# Patient Record
Sex: MALE | Race: WHITE | Employment: OTHER | URBAN - METROPOLITAN AREA
[De-identification: names, ages, dates, MRNs, and addresses within clinical notes are randomized per-mention and may not be internally consistent; named-entity substitution may affect disease eponyms.]

---

## 2017-01-13 ENCOUNTER — LAB ENCOUNTER (OUTPATIENT)
Dept: LAB | Age: 77
End: 2017-01-13
Attending: INTERNAL MEDICINE
Payer: MEDICARE

## 2017-01-13 DIAGNOSIS — R94.31 ABNORMAL EKG: ICD-10-CM

## 2017-01-13 DIAGNOSIS — I25.10 CORONARY ARTERY DISEASE INVOLVING NATIVE CORONARY ARTERY OF NATIVE HEART WITHOUT ANGINA PECTORIS: ICD-10-CM

## 2017-01-13 DIAGNOSIS — Z95.5 STATUS POST CORONARY ARTERY STENT PLACEMENT: ICD-10-CM

## 2017-01-13 DIAGNOSIS — I10 ESSENTIAL HYPERTENSION: ICD-10-CM

## 2017-01-13 DIAGNOSIS — Z00.00 ROUTINE GENERAL MEDICAL EXAMINATION AT A HEALTH CARE FACILITY: ICD-10-CM

## 2017-01-13 LAB
ALBUMIN SERPL-MCNC: 3.7 G/DL (ref 3.5–4.8)
ALP LIVER SERPL-CCNC: 68 U/L (ref 45–117)
ALT SERPL-CCNC: 22 U/L (ref 17–63)
AST SERPL-CCNC: 23 U/L (ref 15–41)
BASOPHILS # BLD AUTO: 0.04 X10(3) UL (ref 0–0.1)
BASOPHILS NFR BLD AUTO: 0.8 %
BILIRUB SERPL-MCNC: 0.6 MG/DL (ref 0.1–2)
BUN BLD-MCNC: 21 MG/DL (ref 8–20)
CALCIUM BLD-MCNC: 8.9 MG/DL (ref 8.3–10.3)
CHLORIDE: 107 MMOL/L (ref 101–111)
CHOLEST SMN-MCNC: 117 MG/DL (ref ?–200)
CO2: 30 MMOL/L (ref 22–32)
CREAT BLD-MCNC: 0.92 MG/DL (ref 0.7–1.3)
EOSINOPHIL # BLD AUTO: 0.31 X10(3) UL (ref 0–0.3)
EOSINOPHIL NFR BLD AUTO: 6.1 %
ERYTHROCYTE [DISTWIDTH] IN BLOOD BY AUTOMATED COUNT: 12.2 % (ref 11.5–16)
GLUCOSE BLD-MCNC: 83 MG/DL (ref 70–99)
HCT VFR BLD AUTO: 42.3 % (ref 37–53)
HDLC SERPL-MCNC: 76 MG/DL (ref 45–?)
HDLC SERPL: 1.54 {RATIO} (ref ?–4.97)
HGB BLD-MCNC: 14.2 G/DL (ref 13–17)
IMMATURE GRANULOCYTE COUNT: 0.01 X10(3) UL (ref 0–1)
IMMATURE GRANULOCYTE RATIO %: 0.2 %
LDLC SERPL CALC-MCNC: 33 MG/DL (ref ?–130)
LYMPHOCYTES # BLD AUTO: 1.95 X10(3) UL (ref 0.9–4)
LYMPHOCYTES NFR BLD AUTO: 38.2 %
M PROTEIN MFR SERPL ELPH: 7 G/DL (ref 6.1–8.3)
MCH RBC QN AUTO: 32.6 PG (ref 27–33.2)
MCHC RBC AUTO-ENTMCNC: 33.6 G/DL (ref 31–37)
MCV RBC AUTO: 97 FL (ref 80–99)
MONOCYTES # BLD AUTO: 0.63 X10(3) UL (ref 0.1–0.6)
MONOCYTES NFR BLD AUTO: 12.3 %
NEUTROPHIL ABS PRELIM: 2.17 X10 (3) UL (ref 1.3–6.7)
NEUTROPHILS # BLD AUTO: 2.17 X10(3) UL (ref 1.3–6.7)
NEUTROPHILS NFR BLD AUTO: 42.4 %
NONHDLC SERPL-MCNC: 41 MG/DL (ref ?–130)
PLATELET # BLD AUTO: 175 10(3)UL (ref 150–450)
POTASSIUM SERPL-SCNC: 4 MMOL/L (ref 3.6–5.1)
RBC # BLD AUTO: 4.36 X10(6)UL (ref 3.8–5.8)
RED CELL DISTRIBUTION WIDTH-SD: 43.7 FL (ref 35.1–46.3)
SODIUM SERPL-SCNC: 143 MMOL/L (ref 136–144)
TRIGLYCERIDES: 38 MG/DL (ref ?–150)
VLDL: 8 MG/DL (ref 5–40)
WBC # BLD AUTO: 5.1 X10(3) UL (ref 4–13)

## 2017-01-13 PROCEDURE — 80061 LIPID PANEL: CPT

## 2017-01-13 PROCEDURE — 36415 COLL VENOUS BLD VENIPUNCTURE: CPT

## 2017-01-13 PROCEDURE — 80053 COMPREHEN METABOLIC PANEL: CPT

## 2017-01-13 PROCEDURE — 85025 COMPLETE CBC W/AUTO DIFF WBC: CPT

## 2017-01-17 ENCOUNTER — PATIENT MESSAGE (OUTPATIENT)
Dept: INTERNAL MEDICINE CLINIC | Facility: CLINIC | Age: 77
End: 2017-01-17

## 2017-01-17 NOTE — TELEPHONE ENCOUNTER
From: Vish Jasso  To: Mirtha Serrato MD  Sent: 1/17/2017 1:43 PM CST  Subject: Other       Dr Olson/Dulce,   I may have misunderstood but thought as a result of the increase of Atorvastatin that besides Cholesterol test   that Liver test were also

## 2017-04-03 RX ORDER — ERGOCALCIFEROL 1.25 MG/1
CAPSULE ORAL
Qty: 3 CAPSULE | Refills: 2 | Status: SHIPPED | OUTPATIENT
Start: 2017-04-03 | End: 2017-10-09

## 2017-06-12 ENCOUNTER — PATIENT MESSAGE (OUTPATIENT)
Dept: INTERNAL MEDICINE CLINIC | Facility: CLINIC | Age: 77
End: 2017-06-12

## 2017-06-12 NOTE — TELEPHONE ENCOUNTER
From: Federica Swann  To: Stacia Lesches, MD  Sent: 6/12/2017 2:56 PM CDT  Subject: Non-Urgent Medical Question       Dr Olson/Dulce,   This note is in regard to scheduling my annual physical. In 2016 on 9-7 I came in for the blood work and tests.      O

## 2017-06-29 RX ORDER — METOPROLOL SUCCINATE 25 MG/1
TABLET, EXTENDED RELEASE ORAL
Qty: 90 TABLET | Refills: 2 | Status: SHIPPED | OUTPATIENT
Start: 2017-06-29 | End: 2018-04-03

## 2017-07-25 ENCOUNTER — PATIENT MESSAGE (OUTPATIENT)
Dept: INTERNAL MEDICINE CLINIC | Facility: CLINIC | Age: 77
End: 2017-07-25

## 2017-07-25 NOTE — TELEPHONE ENCOUNTER
From: Shaorn Dumont  To: Becky Magana MD  Sent: 7/25/2017 3:10 PM CDT  Subject: Non-Urgent Medical Question     Dr Rosa Mckenna,   On Friday & Saturday ( 7-17 & 18 ) I had a hurting or discomfort in my right shoulder.  The   The shoulder no longer is an is

## 2017-09-06 DIAGNOSIS — Z00.00 ROUTINE GENERAL MEDICAL EXAMINATION AT A HEALTH CARE FACILITY: ICD-10-CM

## 2017-09-06 RX ORDER — TAMSULOSIN HYDROCHLORIDE 0.4 MG/1
CAPSULE ORAL
Qty: 90 CAPSULE | Refills: 2 | Status: SHIPPED | OUTPATIENT
Start: 2017-09-06 | End: 2018-06-13

## 2017-09-13 ENCOUNTER — NURSE ONLY (OUTPATIENT)
Dept: INTERNAL MEDICINE CLINIC | Facility: CLINIC | Age: 77
End: 2017-09-13

## 2017-09-13 DIAGNOSIS — Z00.00 LABORATORY EXAMINATION ORDERED AS PART OF A ROUTINE GENERAL MEDICAL EXAMINATION: Primary | ICD-10-CM

## 2017-09-13 LAB
BILIRUB UR QL STRIP.AUTO: NEGATIVE
CLARITY UR REFRACT.AUTO: CLEAR
GLUCOSE UR STRIP.AUTO-MCNC: NEGATIVE MG/DL
KETONES UR STRIP.AUTO-MCNC: NEGATIVE MG/DL
LEUKOCYTE ESTERASE UR QL STRIP.AUTO: NEGATIVE
NITRITE UR QL STRIP.AUTO: NEGATIVE
PH UR STRIP.AUTO: 6 [PH] (ref 4.5–8)
PROT UR STRIP.AUTO-MCNC: NEGATIVE MG/DL
RBC UR QL AUTO: NEGATIVE
SP GR UR STRIP.AUTO: 1.01 (ref 1–1.03)
UROBILINOGEN UR STRIP.AUTO-MCNC: <2 MG/DL

## 2017-09-13 PROCEDURE — 93000 ELECTROCARDIOGRAM COMPLETE: CPT | Performed by: INTERNAL MEDICINE

## 2017-09-13 PROCEDURE — 94010 BREATHING CAPACITY TEST: CPT | Performed by: INTERNAL MEDICINE

## 2017-09-13 PROCEDURE — 81003 URINALYSIS AUTO W/O SCOPE: CPT | Performed by: INTERNAL MEDICINE

## 2017-09-13 PROCEDURE — 99173 VISUAL ACUITY SCREEN: CPT | Performed by: INTERNAL MEDICINE

## 2017-09-13 PROCEDURE — 92551 PURE TONE HEARING TEST AIR: CPT | Performed by: INTERNAL MEDICINE

## 2017-09-13 NOTE — PROGRESS NOTES
*LOOKIN' BODY RESULTS    YES: X      NO:       REASON TEST NOT PERFORMED:        HEIGHT: 5'7.5   WEIGHT: 145  _____________________________________________________________________________  *VENIPUNCTURE    YES: X      NO:        REASON VENIPUNCTURE NOT PER

## 2017-09-19 ENCOUNTER — MED REC SCAN ONLY (OUTPATIENT)
Dept: INTERNAL MEDICINE CLINIC | Facility: CLINIC | Age: 77
End: 2017-09-19

## 2017-09-21 ENCOUNTER — OFFICE VISIT (OUTPATIENT)
Dept: INTERNAL MEDICINE CLINIC | Facility: CLINIC | Age: 77
End: 2017-09-21

## 2017-09-21 DIAGNOSIS — Z53.8 APPOINTMENT CANCELED BY HOSPITAL: Primary | ICD-10-CM

## 2017-09-21 DIAGNOSIS — E55.9 VITAMIN D DEFICIENCY: ICD-10-CM

## 2017-09-21 DIAGNOSIS — M85.839 OSTEOPENIA OF FOREARM, UNSPECIFIED LATERALITY: ICD-10-CM

## 2017-10-04 NOTE — PROGRESS NOTES
HEALTH MAINTENANCE:        Immunization History  Administered            Date(s) Administered    DTAP                  09/14/2014      HIGH DOSE FLU 65 YRS AND OLDER PRSV FREE SINGLE D (96882) FLU CLINIC                          10/24/2016      Influenza Fail Right Ear @ 25dBHL - 4000 Hz: Fail   Left Ear @ 40dBHL - 500 Hz: Pass Right Ear @ 40 dBHL - 500 Hz: Pass   Left Ear @ 40dBHL - 1000 Hz: Pass Right Ear @ 40 dBHL - 1000 Hz: Pass   Left Ear @ 40dBHL - 2000 Hz: Pass Right Ear @ 40 dBHL - 2000 Hz: Pass

## 2017-10-09 ENCOUNTER — OFFICE VISIT (OUTPATIENT)
Dept: INTERNAL MEDICINE CLINIC | Facility: CLINIC | Age: 77
End: 2017-10-09

## 2017-10-09 VITALS
WEIGHT: 145 LBS | BODY MASS INDEX: 22.49 KG/M2 | HEART RATE: 63 BPM | RESPIRATION RATE: 12 BRPM | DIASTOLIC BLOOD PRESSURE: 60 MMHG | TEMPERATURE: 98 F | SYSTOLIC BLOOD PRESSURE: 120 MMHG | OXYGEN SATURATION: 98 % | HEIGHT: 67.5 IN

## 2017-10-09 DIAGNOSIS — Z00.00 ROUTINE GENERAL MEDICAL EXAMINATION AT A HEALTH CARE FACILITY: ICD-10-CM

## 2017-10-09 DIAGNOSIS — I10 ESSENTIAL HYPERTENSION: ICD-10-CM

## 2017-10-09 DIAGNOSIS — R79.82 ELEVATED C-REACTIVE PROTEIN (CRP): ICD-10-CM

## 2017-10-09 DIAGNOSIS — E78.00 PURE HYPERCHOLESTEROLEMIA: ICD-10-CM

## 2017-10-09 DIAGNOSIS — Z98.890 H/O COLONOSCOPY: ICD-10-CM

## 2017-10-09 DIAGNOSIS — Z23 NEED FOR INFLUENZA VACCINATION: ICD-10-CM

## 2017-10-09 DIAGNOSIS — I65.23 ATHEROSCLEROSIS OF BOTH CAROTID ARTERIES: Primary | ICD-10-CM

## 2017-10-09 DIAGNOSIS — M85.839 OSTEOPENIA OF FOREARM, UNSPECIFIED LATERALITY: ICD-10-CM

## 2017-10-09 DIAGNOSIS — Q61.3 POLYCYSTIC KIDNEY: ICD-10-CM

## 2017-10-09 DIAGNOSIS — Z79.51 LONG TERM CURRENT USE OF INHALED STEROID: ICD-10-CM

## 2017-10-09 DIAGNOSIS — C44.90 SKIN CANCER: ICD-10-CM

## 2017-10-09 DIAGNOSIS — Z86.69 HISTORY OF MIGRAINE: ICD-10-CM

## 2017-10-09 DIAGNOSIS — Z95.5 STATUS POST CORONARY ARTERY STENT PLACEMENT: ICD-10-CM

## 2017-10-09 DIAGNOSIS — N40.1 BENIGN NON-NODULAR PROSTATIC HYPERPLASIA WITH LOWER URINARY TRACT SYMPTOMS: ICD-10-CM

## 2017-10-09 PROCEDURE — G0439 PPPS, SUBSEQ VISIT: HCPCS | Performed by: INTERNAL MEDICINE

## 2017-10-09 PROCEDURE — G0008 ADMIN INFLUENZA VIRUS VAC: HCPCS | Performed by: INTERNAL MEDICINE

## 2017-10-09 PROCEDURE — 90653 IIV ADJUVANT VACCINE IM: CPT | Performed by: INTERNAL MEDICINE

## 2017-10-09 RX ORDER — AMLODIPINE BESYLATE 5 MG/1
5 TABLET ORAL DAILY
Qty: 90 TABLET | Refills: 3 | Status: SHIPPED | OUTPATIENT
Start: 2017-10-09 | End: 2017-10-11

## 2017-10-09 RX ORDER — AMLODIPINE BESYLATE 2.5 MG/1
2.5 TABLET ORAL DAILY
Qty: 90 TABLET | Refills: 0 | Status: SHIPPED | OUTPATIENT
Start: 2017-10-09 | End: 2018-05-30

## 2017-10-09 RX ORDER — ATORVASTATIN CALCIUM 20 MG/1
20 TABLET, FILM COATED ORAL NIGHTLY
Qty: 90 TABLET | Refills: 3 | Status: SHIPPED | OUTPATIENT
Start: 2017-10-09 | End: 2018-05-30

## 2017-10-09 RX ORDER — POTASSIUM CHLORIDE 750 MG/1
10 TABLET, FILM COATED, EXTENDED RELEASE ORAL EVERY OTHER DAY
Qty: 45 TABLET | Refills: 3 | Status: SHIPPED | OUTPATIENT
Start: 2017-10-09 | End: 2018-09-24

## 2017-10-09 RX ORDER — AMLODIPINE BESYLATE 5 MG/1
5 TABLET ORAL DAILY
Qty: 90 TABLET | Refills: 0 | Status: SHIPPED | OUTPATIENT
Start: 2017-10-09 | End: 2017-10-11

## 2017-10-09 RX ORDER — AMLODIPINE BESYLATE 2.5 MG/1
2.5 TABLET ORAL DAILY
Qty: 90 TABLET | Refills: 3 | Status: SHIPPED | OUTPATIENT
Start: 2017-10-09 | End: 2018-09-24

## 2017-10-09 RX ORDER — FLUTICASONE PROPIONATE 50 MCG
2 SPRAY, SUSPENSION (ML) NASAL DAILY
Qty: 1 BOTTLE | Refills: 3 | Status: SHIPPED | OUTPATIENT
Start: 2017-10-09 | End: 2018-09-24

## 2017-10-09 RX ORDER — ERGOCALCIFEROL 1.25 MG/1
50000 CAPSULE ORAL
Qty: 3 CAPSULE | Refills: 3 | Status: SHIPPED | OUTPATIENT
Start: 2017-10-09 | End: 2017-11-08

## 2017-10-09 RX ORDER — ATORVASTATIN CALCIUM 20 MG/1
20 TABLET, FILM COATED ORAL NIGHTLY
Qty: 135 TABLET | Refills: 3 | Status: SHIPPED | OUTPATIENT
Start: 2017-10-09 | End: 2018-08-01

## 2017-10-09 RX ORDER — ERGOCALCIFEROL 1.25 MG/1
CAPSULE ORAL
Qty: 3 CAPSULE | Refills: 2 | Status: SHIPPED | OUTPATIENT
Start: 2017-10-09 | End: 2017-10-11

## 2017-10-09 NOTE — PROGRESS NOTES
HPI:    Patient ID: Aramis Crane is a 68year old male. HPI  DEAR Jose Angel    IT WAS NICE SEEING YOU FOR YOUR WELLNESS VISIT ON 10/9/2017            Review of Systems    HPI:    Patient ID: Aramis Crane is a 68year old male.     History of Present Illn Have you had any memory issues?: 0-No    Fall/Risk Scorin          PHQ-4: Over the LAST 2 WEEKS               Little interest or pleasure in doing things (over the last two weeks)?: Not at all    Feeling down, depressed, or hopeless (over the last of kidney stones. Nocturia times: Several times per can go back to sleep. No variant of polycystic kidney large multiple cyst history of renal function okay . On Flomax for BPH urinary tract symptoms very modest symptoms without lightheadedness.     Musc LAD (2.5 x 26mm Resolute Integrity) EF 45%   • Back pain    • BPH (benign prostatic hypertrophy)    • Cataract    • Herpes    • HIGH BLOOD PRESSURE    • Hyperlipidemia    • Inguinal hernia    • OA (osteoarthritis)     hips and hands   • Osteoarthrosis, uns PHYSICAL EXAM:      Vital signs reviewed. Constitutional: Oriented to person, place, and time. No distress. HENT:  Normocephalic and atraumatic.  Hearing and tympanic membranes normal.  Nose normal. Oropharynx is clear and moist. Dentitio prediabetes in addition lack of vegetables too many starches presses food and red meats are problematic. Generally done excellent in all these fields. You exercise regularly excellent diet attendance on vegetables no obesity and avoidance of meats.     In 71    Hemoglobin A1c 5.5 glucose 89    Vitamin D normal 56 vitamin B12 518    Electrolytes and calcium are normal estimated GFR 72 and serum creatinine normal at 1.0 also no evidence of any proteinuria on urinalysis    TSH slightly elevated 4.850 will repe CAPSULE BY MOUTH EVERY DAY Disp: 90 capsule Rfl: 2   METOPROLOL SUCCINATE ER 25 MG Oral Tablet 24 Hr TAKE 1 TABLET BY MOUTH DAILY. Disp: 90 tablet Rfl: 2   TURMERIC OR Take  by mouth.  Disp:  Rfl:    Multiple Vitamins-Minerals (OCUVITE PRESERVISION) Oral Ta 72 years and older (30549)    Meds This Visit:  Signed Prescriptions Disp Refills    AmLODIPine Besylate (NORVASC) 2.5 MG Oral Tab 90 tablet 0      Sig: Take 1 tablet (2.5 mg total) by mouth daily.  TAKES ONE EVERY PM      atorvastatin 20 MG Oral Tab 90 tab

## 2017-10-11 ENCOUNTER — PATIENT MESSAGE (OUTPATIENT)
Dept: INTERNAL MEDICINE CLINIC | Facility: CLINIC | Age: 77
End: 2017-10-11

## 2017-10-11 DIAGNOSIS — E78.00 PURE HYPERCHOLESTEROLEMIA: ICD-10-CM

## 2017-10-11 DIAGNOSIS — Z23 NEED FOR INFLUENZA VACCINATION: ICD-10-CM

## 2017-10-11 DIAGNOSIS — Z00.00 ROUTINE GENERAL MEDICAL EXAMINATION AT A HEALTH CARE FACILITY: ICD-10-CM

## 2017-10-11 DIAGNOSIS — M85.839 OSTEOPENIA OF FOREARM, UNSPECIFIED LATERALITY: ICD-10-CM

## 2017-10-11 DIAGNOSIS — R79.82 ELEVATED C-REACTIVE PROTEIN (CRP): ICD-10-CM

## 2017-10-11 RX ORDER — ERGOCALCIFEROL 1.25 MG/1
CAPSULE ORAL
Qty: 3 CAPSULE | Refills: 2 | Status: SHIPPED | OUTPATIENT
Start: 2017-10-11 | End: 2019-09-23

## 2017-10-11 RX ORDER — AMLODIPINE BESYLATE 5 MG/1
5 TABLET ORAL DAILY
Qty: 90 TABLET | Refills: 2 | Status: SHIPPED | OUTPATIENT
Start: 2017-10-11 | End: 2018-05-30

## 2017-10-11 RX ORDER — AMLODIPINE BESYLATE 5 MG/1
5 TABLET ORAL DAILY
Qty: 90 TABLET | Refills: 2 | Status: SHIPPED | OUTPATIENT
Start: 2017-10-11 | End: 2018-08-01

## 2017-10-11 NOTE — TELEPHONE ENCOUNTER
From: Carson Mckeon  To: Kirill Pugh MD  Sent: 10/11/2017 3:48 PM CDT  Subject: Non-Urgent Medical Question     Dr Olson/Herman,   I'm still confused as to why I'm only getting Two refills on the Amlodopine   Bensylate & the Vit D.  Yesterday the At

## 2017-10-11 NOTE — TELEPHONE ENCOUNTER
From: Eddy Listen  To: Leodan Cisneros MD  Sent: 10/11/2017 8:39 AM CDT  Subject: Non-Urgent Medical Question     Dr Tom Rome,   On 10-9-17 the following prescriptions were phoned to the [address] [place] [address] in [place] ([phone]).    1. Pot Chloride 1

## 2017-12-01 ENCOUNTER — TELEPHONE (OUTPATIENT)
Dept: INTERNAL MEDICINE CLINIC | Facility: CLINIC | Age: 77
End: 2017-12-01

## 2017-12-01 DIAGNOSIS — M16.0 PRIMARY OSTEOARTHRITIS OF BOTH HIPS: ICD-10-CM

## 2017-12-01 DIAGNOSIS — M81.0 SENILE OSTEOPOROSIS: ICD-10-CM

## 2017-12-01 DIAGNOSIS — M85.839 OSTEOPENIA OF FOREARM, UNSPECIFIED LATERALITY: Primary | ICD-10-CM

## 2017-12-01 DIAGNOSIS — E55.9 VITAMIN D DEFICIENCY: ICD-10-CM

## 2017-12-01 DIAGNOSIS — Z79.899 ENCOUNTER FOR LONG-TERM (CURRENT) DRUG USE: ICD-10-CM

## 2017-12-01 DIAGNOSIS — R79.82 ELEVATED C-REACTIVE PROTEIN (CRP): ICD-10-CM

## 2017-12-04 ENCOUNTER — HOSPITAL ENCOUNTER (OUTPATIENT)
Dept: BONE DENSITY | Age: 77
Discharge: HOME | End: 2017-12-04
Attending: INTERNAL MEDICINE
Payer: MEDICARE

## 2017-12-04 DIAGNOSIS — M81.0 SENILE OSTEOPOROSIS: ICD-10-CM

## 2017-12-04 DIAGNOSIS — M85.839 OSTEOPENIA OF FOREARM, UNSPECIFIED LATERALITY: ICD-10-CM

## 2017-12-04 DIAGNOSIS — E55.9 VITAMIN D DEFICIENCY: ICD-10-CM

## 2017-12-04 PROCEDURE — 77080 DXA BONE DENSITY AXIAL: CPT | Performed by: INTERNAL MEDICINE

## 2017-12-04 NOTE — PROGRESS NOTES
Improving bone issues CPM repeat in 3 years.   Does still have some osteopenia but going in the right direction CPM

## 2017-12-07 ENCOUNTER — HOSPITAL ENCOUNTER (OUTPATIENT)
Dept: ULTRASOUND IMAGING | Facility: HOSPITAL | Age: 77
Discharge: HOME | End: 2017-12-07
Attending: INTERNAL MEDICINE
Payer: MEDICARE

## 2017-12-07 DIAGNOSIS — I65.23 ATHEROSCLEROSIS OF BOTH CAROTID ARTERIES: ICD-10-CM

## 2017-12-07 PROCEDURE — 93880 EXTRACRANIAL BILAT STUDY: CPT | Performed by: INTERNAL MEDICINE

## 2018-01-09 ENCOUNTER — LAB ENCOUNTER (OUTPATIENT)
Dept: LAB | Age: 78
End: 2018-01-09
Attending: INTERNAL MEDICINE
Payer: MEDICARE

## 2018-01-09 DIAGNOSIS — Z00.00 ROUTINE GENERAL MEDICAL EXAMINATION AT A HEALTH CARE FACILITY: ICD-10-CM

## 2018-01-09 DIAGNOSIS — R79.82 ELEVATED C-REACTIVE PROTEIN (CRP): ICD-10-CM

## 2018-01-09 DIAGNOSIS — E78.00 PURE HYPERCHOLESTEROLEMIA: ICD-10-CM

## 2018-01-09 DIAGNOSIS — M85.839 OSTEOPENIA OF FOREARM, UNSPECIFIED LATERALITY: ICD-10-CM

## 2018-01-09 DIAGNOSIS — Z23 NEED FOR INFLUENZA VACCINATION: ICD-10-CM

## 2018-01-09 LAB
ALT SERPL-CCNC: 25 U/L (ref 17–63)
AST SERPL-CCNC: 25 U/L (ref 15–41)
CHOLEST SMN-MCNC: 123 MG/DL (ref ?–200)
FREE T4: 1 NG/DL (ref 0.9–1.8)
HDLC SERPL-MCNC: 66 MG/DL (ref 45–?)
HDLC SERPL: 1.86 {RATIO} (ref ?–4.97)
LDLC SERPL CALC-MCNC: 50 MG/DL (ref ?–130)
NONHDLC SERPL-MCNC: 57 MG/DL (ref ?–130)
TRIGL SERPL-MCNC: 33 MG/DL (ref ?–150)
TSI SER-ACNC: 3.92 MIU/ML (ref 0.35–5.5)
VLDLC SERPL CALC-MCNC: 7 MG/DL (ref 5–40)

## 2018-01-09 PROCEDURE — 84439 ASSAY OF FREE THYROXINE: CPT

## 2018-01-09 PROCEDURE — 84460 ALANINE AMINO (ALT) (SGPT): CPT

## 2018-01-09 PROCEDURE — 84443 ASSAY THYROID STIM HORMONE: CPT

## 2018-01-09 PROCEDURE — 84450 TRANSFERASE (AST) (SGOT): CPT

## 2018-01-09 PROCEDURE — 36415 COLL VENOUS BLD VENIPUNCTURE: CPT

## 2018-01-09 PROCEDURE — 80061 LIPID PANEL: CPT

## 2018-01-09 NOTE — PROGRESS NOTES
NORMAL CALL  PATIENT  WITH  RESULTS I really like this LDL cholesterol at 50. It is reasonable. All other liver tests look.   Stay on current cholesterol management thyroid looks good

## 2018-01-10 ENCOUNTER — PATIENT MESSAGE (OUTPATIENT)
Dept: INTERNAL MEDICINE CLINIC | Facility: CLINIC | Age: 78
End: 2018-01-10

## 2018-01-10 ENCOUNTER — TELEPHONE (OUTPATIENT)
Dept: INTERNAL MEDICINE CLINIC | Facility: CLINIC | Age: 78
End: 2018-01-10

## 2018-01-10 NOTE — TELEPHONE ENCOUNTER
Please call pharmacy regarding atorvastatin 20 MG Oral Tab. Patient takes the medication on alternate days, and insurance will not fill with that dosage unless the doctor does a prior [facility]. Please call.

## 2018-01-11 ENCOUNTER — HOSPITAL ENCOUNTER (OUTPATIENT)
Dept: GENERAL RADIOLOGY | Facility: HOSPITAL | Age: 78
Discharge: HOME | End: 2018-01-11
Attending: INTERNAL MEDICINE
Payer: MEDICARE

## 2018-01-11 DIAGNOSIS — G89.29 CHRONIC MIDLINE LOW BACK PAIN WITHOUT SCIATICA: ICD-10-CM

## 2018-01-11 DIAGNOSIS — M54.50 CHRONIC MIDLINE LOW BACK PAIN WITHOUT SCIATICA: ICD-10-CM

## 2018-01-11 PROCEDURE — 72110 X-RAY EXAM L-2 SPINE 4/>VWS: CPT | Performed by: INTERNAL MEDICINE

## 2018-01-11 NOTE — TELEPHONE ENCOUNTER
Spoke with Letty Brito, pharmacist at [facility] approved through 12/31/2018. Patient will receive written confirmation.

## 2018-01-11 NOTE — TELEPHONE ENCOUNTER
From: Sharon Dumont  To: Becky Magana MD  Sent: 1/10/2018 4:36 PM CST  Subject: Prescription Question     Dr Wendel Kurk, Lazarus Riffle not sure what’s going on with these insurance co’s & pharmaces? I use [employer]. Anyway, [facility] been   getting 90 day supplies.

## 2018-03-20 ENCOUNTER — TELEPHONE (OUTPATIENT)
Dept: INTERNAL MEDICINE CLINIC | Facility: CLINIC | Age: 78
End: 2018-03-20

## 2018-03-20 DIAGNOSIS — H93.13 TINNITUS OF BOTH EARS: Primary | ICD-10-CM

## 2018-03-20 NOTE — TELEPHONE ENCOUNTER
Spoke with patient. Believes developed tinnitis. Denies pain. Just annoying. Referred to Jorge L BLANCAS.

## 2018-03-20 NOTE — TELEPHONE ENCOUNTER
Patient has had a ringing in his ears for about 3 weeks. Please recommend specialist for him to see, possibly next week.   Please call

## 2018-04-03 RX ORDER — METOPROLOL SUCCINATE 25 MG/1
TABLET, EXTENDED RELEASE ORAL
Qty: 90 TABLET | Refills: 1 | Status: SHIPPED | OUTPATIENT
Start: 2018-04-03 | End: 2018-09-24

## 2018-04-11 ENCOUNTER — TELEPHONE (OUTPATIENT)
Dept: INTERNAL MEDICINE CLINIC | Facility: CLINIC | Age: 78
End: 2018-04-11

## 2018-04-11 NOTE — TELEPHONE ENCOUNTER
Call to pt regarding ENT recommendation to possible decrease aspirin from 81-325mg to see if it decreased tinnitis.   Per Dr. Gisselle Jackson he said not to change dose unless you receive permission from Cardiologist.    Pt said he does not plan to change dose of a

## 2018-06-04 ENCOUNTER — TELEPHONE (OUTPATIENT)
Dept: INTERNAL MEDICINE CLINIC | Facility: CLINIC | Age: 78
End: 2018-06-04

## 2018-06-05 ENCOUNTER — HOSPITAL ENCOUNTER (OUTPATIENT)
Dept: CV DIAGNOSTICS | Facility: HOSPITAL | Age: 78
Discharge: HOME | End: 2018-06-05
Attending: INTERNAL MEDICINE
Payer: MEDICARE

## 2018-06-05 DIAGNOSIS — I25.10 CORONARY ARTERY DISEASE INVOLVING NATIVE CORONARY ARTERY OF NATIVE HEART WITHOUT ANGINA PECTORIS: ICD-10-CM

## 2018-06-05 DIAGNOSIS — R00.2 PALPITATIONS: ICD-10-CM

## 2018-06-05 PROCEDURE — 93018 CV STRESS TEST I&R ONLY: CPT | Performed by: INTERNAL MEDICINE

## 2018-06-05 PROCEDURE — 93226 XTRNL ECG REC<48 HR SCAN A/R: CPT | Performed by: INTERNAL MEDICINE

## 2018-06-05 PROCEDURE — 93227 XTRNL ECG REC<48 HR R&I: CPT | Performed by: INTERNAL MEDICINE

## 2018-06-05 PROCEDURE — 93225 XTRNL ECG REC<48 HRS REC: CPT | Performed by: INTERNAL MEDICINE

## 2018-06-05 PROCEDURE — 78452 HT MUSCLE IMAGE SPECT MULT: CPT | Performed by: INTERNAL MEDICINE

## 2018-06-05 PROCEDURE — 93017 CV STRESS TEST TRACING ONLY: CPT | Performed by: INTERNAL MEDICINE

## 2018-06-08 ENCOUNTER — TELEPHONE (OUTPATIENT)
Dept: INTERNAL MEDICINE CLINIC | Facility: CLINIC | Age: 78
End: 2018-06-08

## 2018-06-08 NOTE — TELEPHONE ENCOUNTER
Patient is returning phone call he received from BARBARA which he believes is about lab results. Please return his call.

## 2018-06-11 ENCOUNTER — TELEPHONE (OUTPATIENT)
Dept: INTERNAL MEDICINE CLINIC | Facility: CLINIC | Age: 78
End: 2018-06-11

## 2018-06-13 ENCOUNTER — PATIENT MESSAGE (OUTPATIENT)
Dept: INTERNAL MEDICINE CLINIC | Facility: CLINIC | Age: 78
End: 2018-06-13

## 2018-06-13 DIAGNOSIS — Z00.00 ROUTINE GENERAL MEDICAL EXAMINATION AT A HEALTH CARE FACILITY: ICD-10-CM

## 2018-06-13 RX ORDER — TAMSULOSIN HYDROCHLORIDE 0.4 MG/1
CAPSULE ORAL
Qty: 90 CAPSULE | Refills: 0 | Status: SHIPPED | OUTPATIENT
Start: 2018-06-13 | End: 2018-09-17

## 2018-06-13 NOTE — TELEPHONE ENCOUNTER
From: Tato Fernandez  To: Indu Stanley MD  Sent: 6/13/2018 1:53 PM CDT  Subject: Non-Urgent Medical Question     Dr Olson/Dulce,   I came in last year on 9-13-17 to have blood drawn to start my physical. My contact is to schedule   my 2018 Physical.

## 2018-08-01 RX ORDER — ATORVASTATIN CALCIUM 20 MG/1
TABLET, FILM COATED ORAL
Qty: 135 TABLET | Refills: 0 | Status: SHIPPED | OUTPATIENT
Start: 2018-08-01 | End: 2018-09-24

## 2018-08-01 RX ORDER — AMLODIPINE BESYLATE 5 MG/1
TABLET ORAL
Qty: 90 TABLET | Refills: 0 | Status: SHIPPED | OUTPATIENT
Start: 2018-08-01 | End: 2018-09-24

## 2018-08-03 ENCOUNTER — TELEPHONE (OUTPATIENT)
Dept: INTERNAL MEDICINE CLINIC | Facility: CLINIC | Age: 78
End: 2018-08-03

## 2018-08-03 NOTE — TELEPHONE ENCOUNTER
Submitted drug coverage determination requested by Juan Carlos Urbina, even though, [facility] approved this medication through 12/31/2018 back in January 2018.

## 2018-09-12 ENCOUNTER — NURSE ONLY (OUTPATIENT)
Dept: INTERNAL MEDICINE CLINIC | Facility: CLINIC | Age: 78
End: 2018-09-12
Payer: MEDICARE

## 2018-09-12 DIAGNOSIS — Z00.00 LABORATORY EXAMINATION ORDERED AS PART OF A ROUTINE GENERAL MEDICAL EXAMINATION: Primary | ICD-10-CM

## 2018-09-12 LAB
AMB EXT CHOLESTEROL, TOTAL: 129 MG/DL
AMB EXT CREATININE: 0.87 MG/DL
AMB EXT GLUCOSE: 90 MG/DL
AMB EXT HDL CHOLESTEROL: 64 MG/DL
AMB EXT HEMATOCRIT: 40.2
AMB EXT HEMOGLOBIN: 13.6
AMB EXT HGBA1C: 5.6 %
AMB EXT LDL CHOLESTEROL, DIRECT: 56 MG/DL
AMB EXT MCV: 95.5
AMB EXT PLATELETS: 194
AMB EXT PSA SCREEN: 2.54 NG/ML
AMB EXT TRIGLYCERIDES: 47 MG/DL
AMB EXT TSH: 4.27 MIU/ML
AMB EXT WBC: 6 X10(3)UL
BILIRUB UR QL STRIP.AUTO: NEGATIVE
CLARITY UR REFRACT.AUTO: CLEAR
COLOR UR AUTO: YELLOW
GLUCOSE UR STRIP.AUTO-MCNC: NEGATIVE MG/DL
KETONES UR STRIP.AUTO-MCNC: NEGATIVE MG/DL
LEUKOCYTE ESTERASE UR QL STRIP.AUTO: NEGATIVE
NITRITE UR QL STRIP.AUTO: NEGATIVE
PH UR STRIP.AUTO: 7 [PH] (ref 4.5–8)
PROT UR STRIP.AUTO-MCNC: NEGATIVE MG/DL
RBC UR QL AUTO: NEGATIVE
SP GR UR STRIP.AUTO: 1.01 (ref 1–1.03)
UROBILINOGEN UR STRIP.AUTO-MCNC: <2 MG/DL

## 2018-09-12 PROCEDURE — 99173 VISUAL ACUITY SCREEN: CPT | Performed by: INTERNAL MEDICINE

## 2018-09-12 PROCEDURE — 93000 ELECTROCARDIOGRAM COMPLETE: CPT | Performed by: INTERNAL MEDICINE

## 2018-09-12 PROCEDURE — 94010 BREATHING CAPACITY TEST: CPT | Performed by: INTERNAL MEDICINE

## 2018-09-12 PROCEDURE — 92551 PURE TONE HEARING TEST AIR: CPT | Performed by: INTERNAL MEDICINE

## 2018-09-12 PROCEDURE — 81003 URINALYSIS AUTO W/O SCOPE: CPT | Performed by: INTERNAL MEDICINE

## 2018-09-14 ENCOUNTER — TELEPHONE (OUTPATIENT)
Dept: INTERNAL MEDICINE CLINIC | Facility: CLINIC | Age: 78
End: 2018-09-14

## 2018-09-14 NOTE — TELEPHONE ENCOUNTER
New codes Z12.5,N40.1 given per [place] the PSA dx code was used less then 1 year ago need another code

## 2018-09-14 NOTE — TELEPHONE ENCOUNTER
[facility] called and asks for an additional diagnosis code for the PSA order for this patient.     Evelyn Perez @ [phone] option 1

## 2018-09-17 DIAGNOSIS — Z00.00 ROUTINE GENERAL MEDICAL EXAMINATION AT A HEALTH CARE FACILITY: ICD-10-CM

## 2018-09-17 RX ORDER — TAMSULOSIN HYDROCHLORIDE 0.4 MG/1
CAPSULE ORAL
Qty: 90 CAPSULE | Refills: 3 | Status: SHIPPED | OUTPATIENT
Start: 2018-09-17 | End: 2018-09-24

## 2018-09-20 ENCOUNTER — MED REC SCAN ONLY (OUTPATIENT)
Dept: INTERNAL MEDICINE CLINIC | Facility: CLINIC | Age: 78
End: 2018-09-20

## 2018-09-20 NOTE — PROGRESS NOTES
HEALTH MAINTENANCE:      Immunization History   Administered Date(s) Administered   • DTAP 09/14/2014   • FLU VACC High Dose 65 YRS & Older PRSV Free (00132) 10/08/2014, 10/13/2015   • FLUAD High Dose 65 yr and older (67609) 10/09/2017   • HIGH DOSE FLU 65

## 2018-09-24 ENCOUNTER — MED REC SCAN ONLY (OUTPATIENT)
Dept: INTERNAL MEDICINE CLINIC | Facility: CLINIC | Age: 78
End: 2018-09-24

## 2018-09-24 ENCOUNTER — OFFICE VISIT (OUTPATIENT)
Dept: INTERNAL MEDICINE CLINIC | Facility: CLINIC | Age: 78
End: 2018-09-24

## 2018-09-24 VITALS
BODY MASS INDEX: 22.49 KG/M2 | WEIGHT: 145 LBS | TEMPERATURE: 98 F | RESPIRATION RATE: 14 BRPM | HEART RATE: 70 BPM | DIASTOLIC BLOOD PRESSURE: 65 MMHG | SYSTOLIC BLOOD PRESSURE: 126 MMHG | OXYGEN SATURATION: 98 % | HEIGHT: 67.5 IN

## 2018-09-24 DIAGNOSIS — H26.9 CATARACT OF BOTH EYES, UNSPECIFIED CATARACT TYPE: Primary | ICD-10-CM

## 2018-09-24 DIAGNOSIS — Z98.890 H/O COLONOSCOPY: ICD-10-CM

## 2018-09-24 DIAGNOSIS — C44.90 SKIN CANCER: ICD-10-CM

## 2018-09-24 DIAGNOSIS — Z23 NEED FOR INFLUENZA VACCINATION: ICD-10-CM

## 2018-09-24 DIAGNOSIS — I65.23 ATHEROSCLEROSIS OF BOTH CAROTID ARTERIES: ICD-10-CM

## 2018-09-24 DIAGNOSIS — Q61.3 POLYCYSTIC KIDNEY: ICD-10-CM

## 2018-09-24 DIAGNOSIS — Z00.00 ROUTINE GENERAL MEDICAL EXAMINATION AT A HEALTH CARE FACILITY: ICD-10-CM

## 2018-09-24 DIAGNOSIS — I10 ESSENTIAL HYPERTENSION: ICD-10-CM

## 2018-09-24 DIAGNOSIS — E78.00 PURE HYPERCHOLESTEROLEMIA: ICD-10-CM

## 2018-09-24 DIAGNOSIS — N40.1 BENIGN NON-NODULAR PROSTATIC HYPERPLASIA WITH LOWER URINARY TRACT SYMPTOMS: ICD-10-CM

## 2018-09-24 DIAGNOSIS — M85.839 OSTEOPENIA OF FOREARM, UNSPECIFIED LATERALITY: ICD-10-CM

## 2018-09-24 DIAGNOSIS — Z95.5 STATUS POST CORONARY ARTERY STENT PLACEMENT: ICD-10-CM

## 2018-09-24 DIAGNOSIS — R79.82 ELEVATED C-REACTIVE PROTEIN (CRP): ICD-10-CM

## 2018-09-24 PROCEDURE — 90653 IIV ADJUVANT VACCINE IM: CPT | Performed by: INTERNAL MEDICINE

## 2018-09-24 PROCEDURE — G0008 ADMIN INFLUENZA VIRUS VAC: HCPCS | Performed by: INTERNAL MEDICINE

## 2018-09-24 PROCEDURE — 99215 OFFICE O/P EST HI 40 MIN: CPT | Performed by: INTERNAL MEDICINE

## 2018-09-24 RX ORDER — POTASSIUM CHLORIDE 750 MG/1
10 TABLET, FILM COATED, EXTENDED RELEASE ORAL DAILY
Qty: 90 TABLET | Refills: 0 | Status: CANCELLED | OUTPATIENT
Start: 2018-09-24

## 2018-09-24 RX ORDER — TAMSULOSIN HYDROCHLORIDE 0.4 MG/1
0.4 CAPSULE ORAL
Qty: 90 CAPSULE | Refills: 3 | Status: SHIPPED | OUTPATIENT
Start: 2018-09-24 | End: 2019-09-23

## 2018-09-24 RX ORDER — METOPROLOL SUCCINATE 25 MG/1
25 TABLET, EXTENDED RELEASE ORAL
Qty: 90 TABLET | Refills: 1 | Status: SHIPPED | OUTPATIENT
Start: 2018-09-24 | End: 2019-09-23

## 2018-09-24 RX ORDER — AMLODIPINE BESYLATE 2.5 MG/1
2.5 TABLET ORAL DAILY
Qty: 90 TABLET | Refills: 3 | Status: SHIPPED | OUTPATIENT
Start: 2018-09-24 | End: 2019-09-23

## 2018-09-24 RX ORDER — AMLODIPINE BESYLATE 5 MG/1
5 TABLET ORAL
Qty: 90 TABLET | Refills: 0 | Status: SHIPPED | OUTPATIENT
Start: 2018-09-24 | End: 2019-09-23

## 2018-09-24 RX ORDER — ATORVASTATIN CALCIUM 20 MG/1
TABLET, FILM COATED ORAL
Qty: 135 TABLET | Refills: 0 | Status: SHIPPED | OUTPATIENT
Start: 2018-09-24 | End: 2019-01-17

## 2018-09-24 RX ORDER — FLUTICASONE PROPIONATE 50 MCG
2 SPRAY, SUSPENSION (ML) NASAL DAILY
Qty: 1 BOTTLE | Refills: 3 | Status: SHIPPED | OUTPATIENT
Start: 2018-09-24 | End: 2019-05-31

## 2018-09-24 NOTE — PROGRESS NOTES
HPI:    Patient ID: Larisa Cowan is a 66year old male. HPI DEAR Jose Angel    IT WAS NICE SEEING YOU FOR YOUR WELLNESS VISIT ON 9/24/2018           Review of Systems     HPI:    Patient ID: Larisa Cowan is a 66year old male.     History of Present Illne memory issues?: 0-No    Fall/Risk Scorin          PHQ-4: Over the LAST 2 WEEKS               Little interest or pleasure in doing things (over the last two weeks)?: Not at all    Feeling down, depressed, or hopeless (over the last two weeks)?: Not at a active smoking. Cardiovascular: Drug eluting stent 2013 Dr. Harjinder España. Hypertension. Negative for chest pain, palpitations, syncope, and edema. No symptoms of heart failure.   Currently 325 mg aspirin    Gastrointestinal: Negative for nausea, vomit Benign non-nodular prostatic hyperplasia with lower urinary tract symptoms     Elevated C-reactive protein (CRP)      Past Medical History:  Past Medical History:  9/13: Atherosclerosis of coronary artery      Comment:   9/13 cath 40% ostial LM, 80-90% mid insecurity - inability: Not on file      Transportation needs - medical: Not on file      Transportation needs - non-medical: Not on file    Occupational History      Occupation: retired    Tobacco Use      Smoking status: Never Smoker      Smokeless tobac distress. No wheezes, rhonchi or rales. No cyanosis and no clubbing. Abdominal: Soft. Bowel sounds are normal. Non tender, no masses, no organomegaly or hernias. Genitourinary:  Normal testes. No hernia. No rectal masses. Prostate: No nodules. eating should suffice. Drug-eluting stent 2013        #5. 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 Polycystic kidney variant please see above. as they relate to reducing prostate cancer and PSAs in people who have prostate cancer. CBC normal no evidence of bone marrow disease EKG stable and unchanged.   Cognitive function looks good BMI 22                 Current Outpatient Medications:  TAMSUL codeine and             throat tightness-patient tolerated Robitussin-DM             both before and after we believe this is a codeine             reaction  Hctz [Hydrochloroth*    UNKNOWN   PHYSICAL EXAM:   Physical Exam           ASSESSMENT/PLAN:   Rout

## 2018-12-13 ENCOUNTER — LAB ENCOUNTER (OUTPATIENT)
Dept: LAB | Age: 78
End: 2018-12-13
Attending: INTERNAL MEDICINE
Payer: MEDICARE

## 2018-12-13 DIAGNOSIS — R79.82 ELEVATED C-REACTIVE PROTEIN (CRP): ICD-10-CM

## 2018-12-13 PROCEDURE — 86140 C-REACTIVE PROTEIN: CPT

## 2018-12-13 PROCEDURE — 36415 COLL VENOUS BLD VENIPUNCTURE: CPT

## 2019-01-03 RX ORDER — AMLODIPINE BESYLATE 2.5 MG/1
TABLET ORAL
Qty: 90 TABLET | Refills: 3 | Status: SHIPPED | OUTPATIENT
Start: 2019-01-03 | End: 2019-05-31

## 2019-01-03 NOTE — TELEPHONE ENCOUNTER
Requesting Amlodipine   LOV: 9/24/18 (cpx)   Last Relevant Labs: 9/12/18 (scanned)   Filled: 9/24/18 #90 with 0 refills    Future Appointments   Date Time Provider Katherine Oleai   2/71/4129  0:54 PM Zeus Self MD [facility]   9/13/2019

## 2019-01-17 ENCOUNTER — TELEPHONE (OUTPATIENT)
Dept: INTERNAL MEDICINE CLINIC | Facility: CLINIC | Age: 79
End: 2019-01-17

## 2019-01-17 RX ORDER — ATORVASTATIN CALCIUM 20 MG/1
TABLET, FILM COATED ORAL
Qty: 140 TABLET | Refills: 2 | Status: SHIPPED | OUTPATIENT
Start: 2019-01-17 | End: 2019-09-23

## 2019-01-17 NOTE — TELEPHONE ENCOUNTER
Requesting Atorvastatin   LOV: 9/24/18 cpx   Last Relevant Labs: 9/2018 scanned   Filled: 9/24/18 #135 with 0 refills    Future Appointments   Date Time Provider Katherine Cowan   9/13/2019  9:50 AM Dewey Shone, MD [facility]

## 2019-01-17 NOTE — TELEPHONE ENCOUNTER
Patient called and asks for a refill on the following rx:  atorvastatin 20 MG      He asks that a 90 day supply please be called in

## 2019-01-18 ENCOUNTER — TELEPHONE (OUTPATIENT)
Dept: INTERNAL MEDICINE CLINIC | Facility: CLINIC | Age: 79
End: 2019-01-18

## 2019-05-31 ENCOUNTER — OFFICE VISIT (OUTPATIENT)
Dept: INTERNAL MEDICINE CLINIC | Facility: CLINIC | Age: 79
End: 2019-05-31
Payer: MEDICARE

## 2019-05-31 VITALS
HEART RATE: 58 BPM | WEIGHT: 146 LBS | TEMPERATURE: 98 F | HEIGHT: 67.5 IN | DIASTOLIC BLOOD PRESSURE: 60 MMHG | BODY MASS INDEX: 22.65 KG/M2 | SYSTOLIC BLOOD PRESSURE: 122 MMHG

## 2019-05-31 DIAGNOSIS — C44.90 SKIN CANCER: ICD-10-CM

## 2019-05-31 DIAGNOSIS — M54.31 SCIATICA OF RIGHT SIDE: ICD-10-CM

## 2019-05-31 DIAGNOSIS — I10 ESSENTIAL HYPERTENSION: ICD-10-CM

## 2019-05-31 DIAGNOSIS — I25.10 CORONARY ARTERY DISEASE INVOLVING NATIVE CORONARY ARTERY OF NATIVE HEART WITHOUT ANGINA PECTORIS: ICD-10-CM

## 2019-05-31 DIAGNOSIS — H59.362: ICD-10-CM

## 2019-05-31 DIAGNOSIS — Z00.00 ROUTINE GENERAL MEDICAL EXAMINATION AT A HEALTH CARE FACILITY: Primary | ICD-10-CM

## 2019-05-31 DIAGNOSIS — N40.1 BENIGN NON-NODULAR PROSTATIC HYPERPLASIA WITH LOWER URINARY TRACT SYMPTOMS: ICD-10-CM

## 2019-05-31 PROCEDURE — 99213 OFFICE O/P EST LOW 20 MIN: CPT | Performed by: INTERNAL MEDICINE

## 2019-05-31 RX ORDER — FLUTICASONE PROPIONATE 50 MCG
2 SPRAY, SUSPENSION (ML) NASAL AS NEEDED
Qty: 1 BOTTLE | Refills: 3 | COMMUNITY
Start: 2019-05-31 | End: 2019-09-23

## 2019-05-31 RX ORDER — AMOXICILLIN 500 MG/1
500 CAPSULE ORAL 3 TIMES DAILY
Qty: 30 CAPSULE | Refills: 0 | Status: SHIPPED | OUTPATIENT
Start: 2019-05-31 | End: 2019-06-10

## 2019-05-31 NOTE — PROGRESS NOTES
Patient presents with: Other: Questions for provider       HPI: Patient presents. Overall he is doing reasonably well takes excellent care of himself 66years old active. Skied until recently. Sense of well-being is reasonable.   One concern is flooding fever, chills and fatigue. No distress. HENT: Negative for hearing loss, congestion, sore throat, neck pain and dental problem. Eyes: Negative for pain and visual disturbance.      Respiratory: Negative for cough, chest tightness, shortness of breat Oral Tab Take 1 tablet (5 mg total) by mouth once daily. Disp: 90 tablet Rfl: 0   Metoprolol Succinate ER 25 MG Oral Tablet 24 Hr Take 1 tablet (25 mg total) by mouth once daily.  Disp: 90 tablet Rfl: 1   Fluticasone Propionate 50 MCG/ACT Nasal Suspension 2 rhonchi or rales    Abdominal: Soft. Bowel sounds are normal. Non tender, no masses, no organomegaly or hernias. Musculoskeletal: Normal range of motion. No edema and no tenderness. No effusions. d.     Neurological: No confusion.   Normal reflexes

## 2019-07-05 ENCOUNTER — HOSPITAL ENCOUNTER (OUTPATIENT)
Dept: MRI IMAGING | Facility: HOSPITAL | Age: 79
Discharge: HOME | End: 2019-07-05
Attending: INTERNAL MEDICINE
Payer: MEDICARE

## 2019-07-05 DIAGNOSIS — M54.31 SCIATICA OF RIGHT SIDE: ICD-10-CM

## 2019-07-05 PROCEDURE — 72148 MRI LUMBAR SPINE W/O DYE: CPT | Performed by: INTERNAL MEDICINE

## 2019-07-07 NOTE — PROGRESS NOTES
Spinal  Stenosis   Spinal  Column   Pressing multipme  Nerves  -  See  How  Doing  And  PT ?    Consider  Epidurals  Depending  On  Condition

## 2019-07-09 ENCOUNTER — OFFICE VISIT (OUTPATIENT)
Dept: INTERNAL MEDICINE CLINIC | Facility: CLINIC | Age: 79
End: 2019-07-09
Payer: MEDICARE

## 2019-07-09 VITALS
WEIGHT: 141 LBS | OXYGEN SATURATION: 98 % | SYSTOLIC BLOOD PRESSURE: 128 MMHG | HEART RATE: 64 BPM | DIASTOLIC BLOOD PRESSURE: 60 MMHG | TEMPERATURE: 98 F | RESPIRATION RATE: 18 BRPM | HEIGHT: 67.5 IN | BODY MASS INDEX: 21.87 KG/M2

## 2019-07-09 DIAGNOSIS — M54.16 LUMBAR RADICULITIS: Primary | ICD-10-CM

## 2019-07-09 DIAGNOSIS — Q61.3 POLYCYSTIC KIDNEY: ICD-10-CM

## 2019-07-09 PROCEDURE — 99213 OFFICE O/P EST LOW 20 MIN: CPT | Performed by: INTERNAL MEDICINE

## 2019-07-09 NOTE — PROGRESS NOTES
Patient presents with: Follow - Up: discuss MRI results. HPI: Right leg pain consistent with radiculopathy. MRI shows lumbar stenosis spinal L4-L5 but also neuroforaminal on the right side.   Patient had seen [facility] in the past.  He has no weaknes Elevated C-reactive protein (CRP)     Routine general medical examination at a health care facility        Current Outpatient Medications:  atorvastatin 20 MG Oral Tab TAKE 1 TABLET BY MOUTH NIGHTLY AND TAKE 2 TABLETS (40MG) ON [address] SpO2 98%   BMI 21.76 kg/m²   Constitutional: Oriented to person, place, and time. No distress. y             Cardiovascular: Normal rate, regular rhythm and intact distal pulses. No murmur, rubs or gallops.  No  JVD      Pulmonary/Chest: Effort norm

## 2019-08-28 ENCOUNTER — OFFICE VISIT (OUTPATIENT)
Dept: PODIATRY CLINIC | Facility: CLINIC | Age: 79
End: 2019-08-28
Payer: MEDICARE

## 2019-08-28 DIAGNOSIS — M20.42 HAMMER TOES OF BOTH FEET: ICD-10-CM

## 2019-08-28 DIAGNOSIS — M72.2 PLANTAR FASCIITIS OF LEFT FOOT: ICD-10-CM

## 2019-08-28 DIAGNOSIS — M79.672 FOOT PAIN, LEFT: Primary | ICD-10-CM

## 2019-08-28 DIAGNOSIS — M20.41 HAMMER TOES OF BOTH FEET: ICD-10-CM

## 2019-08-28 DIAGNOSIS — M79.671 FOOT PAIN, RIGHT: ICD-10-CM

## 2019-08-28 DIAGNOSIS — M20.10 HAV (HALLUX ABDUCTO VALGUS), UNSPECIFIED LATERALITY: ICD-10-CM

## 2019-08-28 PROCEDURE — 99213 OFFICE O/P EST LOW 20 MIN: CPT | Performed by: PODIATRIST

## 2019-08-28 NOTE — PROGRESS NOTES
Jessica Bautista is a 78year old male. Patient presents with: Foot Pain: 1 week ago left heel started to cause some pain. Right foot has some toe pain. 2/10 for pain.          HPI:     This 77-year-old male patient returns to clinic today complaining of sintia OR Take  by mouth. Disp:  Rfl:    Multiple Vitamins-Minerals (OCUVITE PRESERVISION) Oral Tab Take 1 tablet by mouth daily. Disp:  Rfl:    aspirin  MG Oral Tab EC Take 1 tablet by mouth daily.  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       ASSESSMENT AND PLAN:   Diagnoses and all orders for this visit:    Foot pain, left  -     XR FOOT WEIGHTBEARING (2 VIEWS), LEF

## 2019-09-12 ENCOUNTER — NURSE ONLY (OUTPATIENT)
Dept: INTERNAL MEDICINE CLINIC | Facility: CLINIC | Age: 79
End: 2019-09-12
Payer: MEDICARE

## 2019-09-12 DIAGNOSIS — Z00.00 LABORATORY EXAMINATION ORDERED AS PART OF A ROUTINE GENERAL MEDICAL EXAMINATION: Primary | ICD-10-CM

## 2019-09-12 LAB
BILIRUB UR QL STRIP.AUTO: NEGATIVE
CLARITY UR REFRACT.AUTO: CLEAR
GLUCOSE UR STRIP.AUTO-MCNC: NEGATIVE MG/DL
KETONES UR STRIP.AUTO-MCNC: NEGATIVE MG/DL
LEUKOCYTE ESTERASE UR QL STRIP.AUTO: NEGATIVE
NITRITE UR QL STRIP.AUTO: NEGATIVE
PH UR STRIP.AUTO: 7 [PH] (ref 4.5–8)
PROT UR STRIP.AUTO-MCNC: NEGATIVE MG/DL
RBC UR QL AUTO: NEGATIVE
SP GR UR STRIP.AUTO: 1 (ref 1–1.03)
UROBILINOGEN UR STRIP.AUTO-MCNC: <2 MG/DL

## 2019-09-12 PROCEDURE — 81003 URINALYSIS AUTO W/O SCOPE: CPT | Performed by: INTERNAL MEDICINE

## 2019-09-12 PROCEDURE — 92551 PURE TONE HEARING TEST AIR: CPT | Performed by: INTERNAL MEDICINE

## 2019-09-12 PROCEDURE — 93000 ELECTROCARDIOGRAM COMPLETE: CPT | Performed by: INTERNAL MEDICINE

## 2019-09-12 PROCEDURE — 99173 VISUAL ACUITY SCREEN: CPT | Performed by: INTERNAL MEDICINE

## 2019-09-12 PROCEDURE — 94010 BREATHING CAPACITY TEST: CPT | Performed by: INTERNAL MEDICINE

## 2019-09-12 NOTE — PROGRESS NOTES
*LOOKIN' BODY RESULTS    YES: X      NO:       REASON TEST NOT PERFORMED:        HEIGHT: 5'7.3   WEIGHT: 141  _____________________________________________________________________________  *VENIPUNCTURE    YES:  X     NO:        REASON VENIPUNCTURE NOT PER

## 2019-09-16 ENCOUNTER — HOSPITAL ENCOUNTER (OUTPATIENT)
Dept: ULTRASOUND IMAGING | Facility: HOSPITAL | Age: 79
Discharge: HOME | End: 2019-09-16
Attending: INTERNAL MEDICINE
Payer: MEDICARE

## 2019-09-16 DIAGNOSIS — I65.23 ATHEROSCLEROSIS OF BOTH CAROTID ARTERIES: ICD-10-CM

## 2019-09-16 PROCEDURE — 93880 EXTRACRANIAL BILAT STUDY: CPT | Performed by: INTERNAL MEDICINE

## 2019-09-17 LAB — AMB EXT LDL CHOLESTEROL, DIRECT: 48 MG/DL

## 2019-09-18 ENCOUNTER — OFFICE VISIT (OUTPATIENT)
Dept: PODIATRY CLINIC | Facility: CLINIC | Age: 79
End: 2019-09-18
Payer: MEDICARE

## 2019-09-18 DIAGNOSIS — M72.2 PLANTAR FASCIITIS OF LEFT FOOT: Primary | ICD-10-CM

## 2019-09-18 DIAGNOSIS — M20.42 HAMMER TOES OF BOTH FEET: ICD-10-CM

## 2019-09-18 DIAGNOSIS — M67.40 GANGLION CYST: ICD-10-CM

## 2019-09-18 DIAGNOSIS — M20.41 HAMMER TOES OF BOTH FEET: ICD-10-CM

## 2019-09-18 DIAGNOSIS — M20.10 HAV (HALLUX ABDUCTO VALGUS), UNSPECIFIED LATERALITY: ICD-10-CM

## 2019-09-18 PROCEDURE — 99213 OFFICE O/P EST LOW 20 MIN: CPT | Performed by: PODIATRIST

## 2019-09-18 NOTE — PROGRESS NOTES
Carson Mckeon is a 78year old male. Patient presents with: Follow - Up: plantar fasciitis - left heel doing better - still stretching and icing everyday - denies taking any pain meds.     HPI:     This 51-year-old male patient returns to clinic today in Take 1 tablet by mouth daily. Disp: 30 tablet Rfl: 0   omega-3 fatty acids (FISH OIL) 1000 MG Oral Cap Take 1,000 mg by mouth 2 (two) times daily. Disp:  Rfl:    Selenium 200 MCG Oral Cap Take 100 mcg by mouth.    Disp:  Rfl:    Potassium Chloride (KLOR-C education level: Not on file    Occupational History      Occupation: retired    Tobacco Use      Smoking status: Never Smoker      Smokeless tobacco: Never Used    Substance and Sexual Activity      Alcohol use:  Yes        Alcohol/week: 0.0 standard drink right second digit where he was previously symptomatic with no pain/tenderness on palpation noted on exam today.     ASSESSMENT AND PLAN:   Diagnoses and all orders for this visit:    Plantar fasciitis of left foot    Hav (hallux abducto valgus), unspecifie

## 2019-09-19 ENCOUNTER — MED REC SCAN ONLY (OUTPATIENT)
Dept: INTERNAL MEDICINE CLINIC | Facility: CLINIC | Age: 79
End: 2019-09-19

## 2019-09-19 NOTE — PROGRESS NOTES
HEALTH MAINTENANCE:      Immunization History   Administered Date(s) Administered   • DTAP 09/14/2014   • FLU VACC High Dose 65 YRS & Older PRSV Free (10140) 10/08/2014, 10/13/2015   • FLUAD High Dose 72 yr and older (41329) 10/09/2017, 09/24/2018   • Bran Ordonez

## 2019-09-23 ENCOUNTER — TELEPHONE (OUTPATIENT)
Dept: INTERNAL MEDICINE CLINIC | Facility: CLINIC | Age: 79
End: 2019-09-23

## 2019-09-23 ENCOUNTER — OFFICE VISIT (OUTPATIENT)
Dept: INTERNAL MEDICINE CLINIC | Facility: CLINIC | Age: 79
End: 2019-09-23
Payer: MEDICARE

## 2019-09-23 VITALS
OXYGEN SATURATION: 95 % | DIASTOLIC BLOOD PRESSURE: 60 MMHG | TEMPERATURE: 98 F | SYSTOLIC BLOOD PRESSURE: 116 MMHG | HEART RATE: 68 BPM | RESPIRATION RATE: 14 BRPM

## 2019-09-23 DIAGNOSIS — M85.80 OSTEOPENIA, UNSPECIFIED LOCATION: ICD-10-CM

## 2019-09-23 DIAGNOSIS — H26.9 CATARACT OF BOTH EYES, UNSPECIFIED CATARACT TYPE: ICD-10-CM

## 2019-09-23 DIAGNOSIS — I25.10 CORONARY ARTERY DISEASE INVOLVING NATIVE CORONARY ARTERY OF NATIVE HEART WITHOUT ANGINA PECTORIS: ICD-10-CM

## 2019-09-23 DIAGNOSIS — I65.23 ATHEROSCLEROSIS OF BOTH CAROTID ARTERIES: ICD-10-CM

## 2019-09-23 DIAGNOSIS — M72.2 PLANTAR FASCIITIS: ICD-10-CM

## 2019-09-23 DIAGNOSIS — R79.82 ELEVATED C-REACTIVE PROTEIN (CRP): ICD-10-CM

## 2019-09-23 DIAGNOSIS — Z98.890 H/O COLONOSCOPY: ICD-10-CM

## 2019-09-23 DIAGNOSIS — Z95.5 STATUS POST CORONARY ARTERY STENT PLACEMENT: ICD-10-CM

## 2019-09-23 DIAGNOSIS — Z23 NEED FOR INFLUENZA VACCINATION: ICD-10-CM

## 2019-09-23 DIAGNOSIS — Q61.3 POLYCYSTIC KIDNEY: Primary | ICD-10-CM

## 2019-09-23 DIAGNOSIS — M16.0 PRIMARY OSTEOARTHRITIS OF BOTH HIPS: ICD-10-CM

## 2019-09-23 DIAGNOSIS — N40.1 BENIGN NON-NODULAR PROSTATIC HYPERPLASIA WITH LOWER URINARY TRACT SYMPTOMS: ICD-10-CM

## 2019-09-23 DIAGNOSIS — E78.00 PURE HYPERCHOLESTEROLEMIA: ICD-10-CM

## 2019-09-23 DIAGNOSIS — Z86.69 HISTORY OF MIGRAINE: ICD-10-CM

## 2019-09-23 DIAGNOSIS — M85.839 OSTEOPENIA OF FOREARM, UNSPECIFIED LATERALITY: ICD-10-CM

## 2019-09-23 DIAGNOSIS — C44.90 SKIN CANCER: ICD-10-CM

## 2019-09-23 DIAGNOSIS — I10 ESSENTIAL HYPERTENSION: ICD-10-CM

## 2019-09-23 DIAGNOSIS — Z00.00 ROUTINE GENERAL MEDICAL EXAMINATION AT A HEALTH CARE FACILITY: ICD-10-CM

## 2019-09-23 PROBLEM — E78.41 ELEVATED LIPOPROTEIN(A): Status: ACTIVE | Noted: 2019-09-23

## 2019-09-23 PROCEDURE — 90662 IIV NO PRSV INCREASED AG IM: CPT | Performed by: INTERNAL MEDICINE

## 2019-09-23 PROCEDURE — G0439 PPPS, SUBSEQ VISIT: HCPCS | Performed by: INTERNAL MEDICINE

## 2019-09-23 PROCEDURE — G0008 ADMIN INFLUENZA VIRUS VAC: HCPCS | Performed by: INTERNAL MEDICINE

## 2019-09-23 RX ORDER — ATORVASTATIN CALCIUM 40 MG/1
40 TABLET, FILM COATED ORAL NIGHTLY
Qty: 90 TABLET | Refills: 3 | Status: SHIPPED | OUTPATIENT
Start: 2019-09-23 | End: 2020-09-14

## 2019-09-23 RX ORDER — TAMSULOSIN HYDROCHLORIDE 0.4 MG/1
0.4 CAPSULE ORAL
Qty: 90 CAPSULE | Refills: 3 | Status: SHIPPED | OUTPATIENT
Start: 2019-09-23 | End: 2019-12-08

## 2019-09-23 RX ORDER — AMLODIPINE BESYLATE 5 MG/1
5 TABLET ORAL
Qty: 90 TABLET | Refills: 0 | Status: SHIPPED | OUTPATIENT
Start: 2019-09-23 | End: 2020-01-02

## 2019-09-23 RX ORDER — AMLODIPINE BESYLATE 2.5 MG/1
2.5 TABLET ORAL DAILY
Qty: 90 TABLET | Refills: 3 | Status: SHIPPED | OUTPATIENT
Start: 2019-09-23 | End: 2020-09-14

## 2019-09-23 RX ORDER — ATORVASTATIN CALCIUM 40 MG/1
40 TABLET, FILM COATED ORAL DAILY
Qty: 90 TABLET | Refills: 3 | Status: SHIPPED | OUTPATIENT
Start: 2019-09-23 | End: 2019-10-17

## 2019-09-23 RX ORDER — ERGOCALCIFEROL 1.25 MG/1
CAPSULE ORAL
Qty: 3 CAPSULE | Refills: 2 | Status: SHIPPED | OUTPATIENT
Start: 2019-09-23 | End: 2020-07-14

## 2019-09-23 RX ORDER — POTASSIUM CHLORIDE 750 MG/1
10 TABLET, EXTENDED RELEASE ORAL EVERY OTHER DAY
COMMUNITY
End: 2019-09-23

## 2019-09-23 RX ORDER — ATORVASTATIN CALCIUM 20 MG/1
TABLET, FILM COATED ORAL
Qty: 140 TABLET | Refills: 2 | Status: SHIPPED | OUTPATIENT
Start: 2019-09-23 | End: 2019-09-23

## 2019-09-23 RX ORDER — FLUTICASONE PROPIONATE 50 MCG
2 SPRAY, SUSPENSION (ML) NASAL AS NEEDED
Qty: 1 BOTTLE | Refills: 3 | Status: SHIPPED | OUTPATIENT
Start: 2019-09-23

## 2019-09-23 RX ORDER — METOPROLOL SUCCINATE 25 MG/1
25 TABLET, EXTENDED RELEASE ORAL
Qty: 90 TABLET | Refills: 1 | Status: SHIPPED | OUTPATIENT
Start: 2019-09-23 | End: 2020-03-24

## 2019-09-23 RX ORDER — POTASSIUM CHLORIDE 750 MG/1
10 TABLET, EXTENDED RELEASE ORAL EVERY OTHER DAY
Qty: 90 TABLET | Refills: 0 | Status: SHIPPED | OUTPATIENT
Start: 2019-09-23 | End: 2020-03-24

## 2019-09-23 NOTE — PROGRESS NOTES
HPI:    Patient ID: Sharon Dumont is a 78year old male. HPI DEAR Jose Angel    IT WAS NICE SEEING YOU FOR YOUR WELLNESS VISIT ON 9/23/2019           Review of Systems   HPI:    Patient ID: Sharon Dumont is a 78year old male.     History of Present Illness a living will?: Yes     Please go to \"Cognitive Assessment\" under Medicare Assessment section in Charting, test patient and document. Then, refresh your progress note to see your input here.   Cognitive Assessment     What day of the week is this?: Cor Denies GERD. No current liver disorder. Genitourinary: Flomax nightly. BPH. Still has nocturia is a good strain history of renal cyst negative for dysuria and hematuria. Urinary stream normal.  Denies history of kidney stones. Nocturia times:      Mu CAD (coronary artery disease)     Status post coronary artery stent placement     HTN (hypertension)     History of migraine     Osteopenia     Carotid artery plaque     Bilateral cataracts     H/O colonoscopy     Hyperlipidemia     Benign non-nodular pros Financial resource strain: Not on file      Food insecurity:        Worry: Not on file        Inability: Not on file      Transportation needs:        Medical: Not on file        Non-medical: Not on file    Tobacco Use      Smoking status: Never Smoker reviewed. Constitutional: Oriented to person, place, and time. No distress. HENT:  Normocephalic and atraumatic. Hearing and tympanic membranes normal.  Nose normal. Oropharynx is clear and moist. Dentition adequate.         Eyes: Conjunctivae and EO activity needs to heal and repair    Use of almonds avocados daily and olive oil most important. To help heal      We reviewed again the importance of moving over to a vegetarian diet. Daily activity generally doing well. Stress can kill.   You believe t nocturia 3 times at night but no evidence of symptomatology of diminished stream or inability to empty we did discuss possibility of shrinking the prostate by meds and we both elected not to pursue         #10.   Foot pain plantar fasciitis exercises by pod Metoprolol Succinate ER 25 MG Oral Tablet 24 Hr Take 1 tablet (25 mg total) by mouth once daily. Disp: 90 tablet Rfl: 1   AmLODIPine Besylate 2.5 MG Oral Tab Take 1 tablet (2.5 mg total) by mouth daily.  Disp: 90 tablet Rfl: 3   ergocalciferol 09388 units pectoris  Essential hypertension  Cataract of both eyes, unspecified cataract type  Benign non-nodular prostatic hyperplasia with lower urinary tract symptoms  Plantar fasciitis    No orders of the defined types were placed in this encounter.       Meds Thi

## 2019-10-17 ENCOUNTER — OFFICE VISIT (OUTPATIENT)
Dept: INTERNAL MEDICINE CLINIC | Facility: CLINIC | Age: 79
End: 2019-10-17
Payer: MEDICARE

## 2019-10-17 VITALS
TEMPERATURE: 98 F | RESPIRATION RATE: 14 BRPM | HEART RATE: 68 BPM | SYSTOLIC BLOOD PRESSURE: 122 MMHG | OXYGEN SATURATION: 97 % | WEIGHT: 145 LBS | DIASTOLIC BLOOD PRESSURE: 58 MMHG | BODY MASS INDEX: 22 KG/M2

## 2019-10-17 DIAGNOSIS — I65.23 ATHEROSCLEROSIS OF BOTH CAROTID ARTERIES: Primary | ICD-10-CM

## 2019-10-17 DIAGNOSIS — M85.839 OSTEOPENIA OF FOREARM, UNSPECIFIED LATERALITY: ICD-10-CM

## 2019-10-17 PROCEDURE — 99213 OFFICE O/P EST LOW 20 MIN: CPT | Performed by: INTERNAL MEDICINE

## 2019-10-17 NOTE — PROGRESS NOTES
Patient presents with: Follow - Up: dexa      HPI: Follow-up in some testing. Bone density test T- 2.3 femoral neck. Several years ago T score -1.7.  5 years ago T score was -2.1    Patient is asymptomatic. Patient also reviewed risk factors.   Patient plaque     Bilateral cataracts     H/O colonoscopy     Hyperlipidemia     Benign non-nodular prostatic hyperplasia with lower urinary tract symptoms     Elevated C-reactive protein (CRP)     Routine general medical examination at a health care facility other day. 400MG MAGNESIUM + ZINC 15MG. , Disp: , Rfl:         Physical Exam  /58   Pulse 68   Temp 97.9 °F (36.6 °C)   Resp 14   Wt 145 lb (65.8 kg)   SpO2 97%   BMI 22.38 kg/m²   Constitutional: Oriented to person, place, and time. No distress.

## 2019-11-13 ENCOUNTER — OFFICE VISIT (OUTPATIENT)
Dept: PODIATRY CLINIC | Facility: CLINIC | Age: 79
End: 2019-11-13
Payer: MEDICARE

## 2019-11-13 DIAGNOSIS — M20.11 HAV (HALLUX ABDUCTO VALGUS), RIGHT: ICD-10-CM

## 2019-11-13 DIAGNOSIS — L84 PRE-ULCERATIVE CORN OR CALLOUS: Primary | ICD-10-CM

## 2019-11-13 DIAGNOSIS — M20.5X1 HALLUX LIMITUS OF RIGHT FOOT: ICD-10-CM

## 2019-11-13 DIAGNOSIS — M20.41 HAMMER TOE OF RIGHT FOOT: ICD-10-CM

## 2019-11-13 DIAGNOSIS — M77.40 METATARSALGIA, UNSPECIFIED LATERALITY: ICD-10-CM

## 2019-11-13 DIAGNOSIS — M79.671 FOOT PAIN, RIGHT: ICD-10-CM

## 2019-11-13 DIAGNOSIS — M77.51 BONE SPUR OF TOE OF RIGHT FOOT: ICD-10-CM

## 2019-11-13 PROCEDURE — 99213 OFFICE O/P EST LOW 20 MIN: CPT | Performed by: PODIATRIST

## 2019-11-13 NOTE — PROGRESS NOTES
Sukhwinder Dobbs is a 78year old male. Patient presents with:  Callus: Right hallux, callus build up and is causing some discomfort. 7/10 for pain when it hurts. HPI:     This 77-year-old male patient returns to clinic today in follow up.   He complains of Magnesium 100 MG Oral Cap Take  by mouth every other day. 400MG MAGNESIUM + ZINC 15MG.          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   He is to wear the accommodative padding as directed in order to offload the area of the pre-ulcerative buildup which he is to remove daily for sleeping and showering and which he is to discontinue u

## 2019-12-04 ENCOUNTER — TELEPHONE (OUTPATIENT)
Dept: INTERNAL MEDICINE CLINIC | Facility: CLINIC | Age: 79
End: 2019-12-04

## 2019-12-04 ENCOUNTER — OFFICE VISIT (OUTPATIENT)
Dept: INTERNAL MEDICINE CLINIC | Facility: CLINIC | Age: 79
End: 2019-12-04
Payer: MEDICARE

## 2019-12-04 ENCOUNTER — HOSPITAL ENCOUNTER (OUTPATIENT)
Dept: GENERAL RADIOLOGY | Facility: HOSPITAL | Age: 79
Discharge: HOME | End: 2019-12-04
Attending: INTERNAL MEDICINE
Payer: MEDICARE

## 2019-12-04 VITALS
RESPIRATION RATE: 12 BRPM | DIASTOLIC BLOOD PRESSURE: 70 MMHG | BODY MASS INDEX: 22 KG/M2 | TEMPERATURE: 101 F | SYSTOLIC BLOOD PRESSURE: 130 MMHG | OXYGEN SATURATION: 97 % | WEIGHT: 142 LBS | HEART RATE: 80 BPM

## 2019-12-04 DIAGNOSIS — J22 LOWER RESPIRATORY INFECTION: ICD-10-CM

## 2019-12-04 DIAGNOSIS — R93.89 ABNORMAL X-RAY: Primary | ICD-10-CM

## 2019-12-04 DIAGNOSIS — R50.9 FEVER, UNSPECIFIED FEVER CAUSE: ICD-10-CM

## 2019-12-04 DIAGNOSIS — R05.8 PRODUCTIVE COUGH: ICD-10-CM

## 2019-12-04 DIAGNOSIS — E78.5 ELEVATED LIPIDS: Primary | ICD-10-CM

## 2019-12-04 DIAGNOSIS — C44.90 SKIN CANCER: ICD-10-CM

## 2019-12-04 PROBLEM — R91.8 MASS OF UPPER LOBE OF LEFT LUNG: Status: ACTIVE | Noted: 2019-12-04

## 2019-12-04 PROCEDURE — 71046 X-RAY EXAM CHEST 2 VIEWS: CPT | Performed by: INTERNAL MEDICINE

## 2019-12-04 PROCEDURE — 99213 OFFICE O/P EST LOW 20 MIN: CPT | Performed by: INTERNAL MEDICINE

## 2019-12-04 RX ORDER — AZITHROMYCIN 250 MG/1
TABLET, FILM COATED ORAL
Qty: 6 TABLET | Refills: 0 | Status: SHIPPED | OUTPATIENT
Start: 2019-12-04 | End: 2020-01-21 | Stop reason: ALTCHOICE

## 2019-12-04 RX ORDER — BENZONATATE 100 MG/1
100 CAPSULE ORAL 3 TIMES DAILY PRN
Qty: 30 CAPSULE | Refills: 1 | Status: SHIPPED | OUTPATIENT
Start: 2019-12-04 | End: 2020-01-21 | Stop reason: ALTCHOICE

## 2019-12-04 NOTE — TELEPHONE ENCOUNTER
Patient called back, Dr. Kate Faulkner was in with patient. Dr. Kate Faulkner aware of call back, he had to go see a patient at the hospital and said he would call when he returns.  Would like RN to order CT chest with contrast and Zpack

## 2019-12-04 NOTE — PROGRESS NOTES
Chest x-ray comes back possible destructive rib lesion also possible small mass left upper lung. I will now proceed and get a CT of the chest with contrast and add a antibiotic Z-Bryon would be appropriate.

## 2019-12-04 NOTE — TELEPHONE ENCOUNTER
----- Message from Janine Bunn MD sent at 12/4/2019  2:19 PM CST -----  Discussed with patient CT lung with contrast Z-Bryon present time

## 2019-12-04 NOTE — PROGRESS NOTES
Patient presents with:  Cough  Fever      HPI: Patient returning on train from [place]. Last week. Developed feverish some chills without Reiger's early in the weekend. Cough definitely. Not really productive. Patient had flu shot.   But still fe weakness, numbness, tingling and headaches. Psychiatric/Behavioral: The patient is not nervous/anxious. No depression. Vascular: Veins normal. No bruits.     Patient Active Problem List:     Polycystic kidney     Skin cancer     CAD (coronary a (two) times daily. • Selenium 200 MCG Oral Cap Take 100 mcg by mouth. • Glucosamine-Chondroit-Vit C-Mn (GLUCOSAMINE CHONDR 1500 COMPLX OR) Take  by mouth. • Calcium Carbonate-Vitamin D (CALTRATE 600+D OR) Take  by mouth.      • Magnesium 100 file.  There are no Patient Instructions on file for this visit.

## 2019-12-06 ENCOUNTER — HOSPITAL ENCOUNTER (OUTPATIENT)
Dept: CT IMAGING | Facility: HOSPITAL | Age: 79
Discharge: HOME | End: 2019-12-06
Attending: INTERNAL MEDICINE
Payer: MEDICARE

## 2019-12-06 DIAGNOSIS — J18.9 PNEUMONIA DUE TO INFECTIOUS ORGANISM, UNSPECIFIED LATERALITY, UNSPECIFIED PART OF LUNG: Primary | ICD-10-CM

## 2019-12-06 DIAGNOSIS — R93.89 ABNORMAL X-RAY: ICD-10-CM

## 2019-12-06 DIAGNOSIS — R05.8 PRODUCTIVE COUGH: ICD-10-CM

## 2019-12-06 PROCEDURE — 82565 ASSAY OF CREATININE: CPT

## 2019-12-06 PROCEDURE — 71260 CT THORAX DX C+: CPT | Performed by: INTERNAL MEDICINE

## 2019-12-08 DIAGNOSIS — Z00.00 ROUTINE GENERAL MEDICAL EXAMINATION AT A HEALTH CARE FACILITY: ICD-10-CM

## 2019-12-09 RX ORDER — TAMSULOSIN HYDROCHLORIDE 0.4 MG/1
CAPSULE ORAL
Qty: 90 CAPSULE | Refills: 3 | Status: SHIPPED | OUTPATIENT
Start: 2019-12-09 | End: 2020-12-17

## 2019-12-09 NOTE — TELEPHONE ENCOUNTER
Requesting Tamsulosin   LOV: 9/23/19 cpe   Last Relevant Labs: n/a   Filled: 9/23/19 #90 with 3 refills    Future Appointments   Date Time Provider Katherine Cowan   12/30/2019  2:00 PM [facility] (CHEST) [id number] [facility]   4/9/6814 43:92 AM Ainsley Jeans

## 2019-12-30 ENCOUNTER — HOSPITAL ENCOUNTER (OUTPATIENT)
Dept: GENERAL RADIOLOGY | Facility: HOSPITAL | Age: 79
Discharge: HOME | End: 2019-12-30
Attending: INTERNAL MEDICINE
Payer: MEDICARE

## 2019-12-30 ENCOUNTER — LAB ENCOUNTER (OUTPATIENT)
Dept: LAB | Age: 79
End: 2019-12-30
Attending: INTERNAL MEDICINE
Payer: MEDICARE

## 2019-12-30 DIAGNOSIS — J18.9 PNEUMONIA DUE TO INFECTIOUS ORGANISM, UNSPECIFIED LATERALITY, UNSPECIFIED PART OF LUNG: ICD-10-CM

## 2019-12-30 DIAGNOSIS — E78.5 ELEVATED LIPIDS: ICD-10-CM

## 2019-12-30 DIAGNOSIS — R79.82 ELEVATED C-REACTIVE PROTEIN (CRP): ICD-10-CM

## 2019-12-30 LAB
ALBUMIN SERPL-MCNC: 3.5 G/DL (ref 3.4–5)
ALP LIVER SERPL-CCNC: 69 U/L (ref 45–117)
ALT SERPL-CCNC: 28 U/L (ref 16–61)
AST SERPL-CCNC: 26 U/L (ref 15–37)
BILIRUB DIRECT SERPL-MCNC: 0.2 MG/DL (ref 0–0.2)
BILIRUB SERPL-MCNC: 0.7 MG/DL (ref 0.1–2)
CHOLEST SMN-MCNC: 134 MG/DL (ref ?–200)
CRP SERPL HS-MCNC: 0.89 MG/L (ref ?–3)
HDLC SERPL-MCNC: 72 MG/DL (ref 40–59)
LDLC SERPL CALC-MCNC: 54 MG/DL (ref ?–100)
M PROTEIN MFR SERPL ELPH: 7.4 G/DL (ref 6.4–8.2)
NONHDLC SERPL-MCNC: 62 MG/DL (ref ?–130)
PATIENT FASTING Y/N/NP: YES
TRIGL SERPL-MCNC: 38 MG/DL (ref 30–149)
VLDLC SERPL CALC-MCNC: 8 MG/DL (ref 0–30)

## 2019-12-30 PROCEDURE — 71046 X-RAY EXAM CHEST 2 VIEWS: CPT | Performed by: INTERNAL MEDICINE

## 2019-12-30 PROCEDURE — 86141 C-REACTIVE PROTEIN HS: CPT

## 2019-12-30 PROCEDURE — 80061 LIPID PANEL: CPT

## 2019-12-30 PROCEDURE — 36415 COLL VENOUS BLD VENIPUNCTURE: CPT

## 2019-12-30 PROCEDURE — 80076 HEPATIC FUNCTION PANEL: CPT

## 2020-01-02 RX ORDER — AMLODIPINE BESYLATE 5 MG/1
TABLET ORAL
Qty: 90 TABLET | Refills: 2 | Status: SHIPPED | OUTPATIENT
Start: 2020-01-02 | End: 2020-09-14

## 2020-01-02 NOTE — TELEPHONE ENCOUNTER
Requesting Amlodipine   LOV: 9/23/19 cpe   Last Relevant Labs: 9/19 scanned   Filled: 9/23/19 #90 with 0 refills    Future Appointments   Date Time Provider Katherine Oleai   2/8/3620 80:90 AM Marshall Swanson MD [facility]   1/21/2020  8:00 AM

## 2020-01-21 ENCOUNTER — OFFICE VISIT (OUTPATIENT)
Dept: INTERNAL MEDICINE CLINIC | Facility: CLINIC | Age: 80
End: 2020-01-21
Payer: MEDICARE

## 2020-01-21 ENCOUNTER — TELEPHONE (OUTPATIENT)
Dept: INTERNAL MEDICINE CLINIC | Facility: CLINIC | Age: 80
End: 2020-01-21

## 2020-01-21 VITALS
HEIGHT: 67 IN | DIASTOLIC BLOOD PRESSURE: 58 MMHG | WEIGHT: 140 LBS | TEMPERATURE: 98 F | HEART RATE: 100 BPM | BODY MASS INDEX: 21.97 KG/M2 | SYSTOLIC BLOOD PRESSURE: 130 MMHG | RESPIRATION RATE: 14 BRPM | OXYGEN SATURATION: 97 %

## 2020-01-21 DIAGNOSIS — I10 ESSENTIAL HYPERTENSION: ICD-10-CM

## 2020-01-21 DIAGNOSIS — C44.90 SKIN CANCER: ICD-10-CM

## 2020-01-21 DIAGNOSIS — E78.41 ELEVATED LIPOPROTEIN(A): Primary | ICD-10-CM

## 2020-01-21 DIAGNOSIS — J18.9 PNEUMONIA OF LEFT LOWER LOBE DUE TO INFECTIOUS ORGANISM: ICD-10-CM

## 2020-01-21 DIAGNOSIS — R93.89 ABNORMAL CT OF THE CHEST: Primary | ICD-10-CM

## 2020-01-21 DIAGNOSIS — I70.0 ARTERIOSCLEROSIS OF THORACIC AORTA (HCC): ICD-10-CM

## 2020-01-21 DIAGNOSIS — R79.82 ELEVATED C-REACTIVE PROTEIN (CRP): ICD-10-CM

## 2020-01-21 DIAGNOSIS — Z98.890 STATUS POST MOHS SURGERY: ICD-10-CM

## 2020-01-21 DIAGNOSIS — Q61.3 POLYCYSTIC KIDNEY: ICD-10-CM

## 2020-01-21 PROBLEM — R91.8 MASS OF UPPER LOBE OF LEFT LUNG: Status: RESOLVED | Noted: 2019-12-04 | Resolved: 2020-01-21

## 2020-01-21 PROCEDURE — 99213 OFFICE O/P EST LOW 20 MIN: CPT | Performed by: INTERNAL MEDICINE

## 2020-01-21 NOTE — TELEPHONE ENCOUNTER
Call tx to Dr. José Miguel Mccurdy. Per Dr. José Miguel Mccurdy order CT chest f/u early Feb. Follow up testing ordered.

## 2020-01-21 NOTE — PROGRESS NOTES
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 No distress.        Cardiovascular: Normal rate, re

## 2020-01-30 ENCOUNTER — HOSPITAL ENCOUNTER (OUTPATIENT)
Dept: CT IMAGING | Facility: HOSPITAL | Age: 80
Discharge: HOME | End: 2020-01-30
Attending: INTERNAL MEDICINE
Payer: MEDICARE

## 2020-01-30 DIAGNOSIS — R93.89 ABNORMAL CT OF THE CHEST: ICD-10-CM

## 2020-01-30 LAB — CREAT BLD-MCNC: 1 MG/DL (ref 0.7–1.3)

## 2020-01-30 PROCEDURE — 71260 CT THORAX DX C+: CPT | Performed by: INTERNAL MEDICINE

## 2020-01-30 PROCEDURE — 82565 ASSAY OF CREATININE: CPT

## 2020-01-31 DIAGNOSIS — R93.89 ABNORMAL CT SCAN: Primary | ICD-10-CM

## 2020-03-17 ENCOUNTER — HOSPITAL ENCOUNTER (OUTPATIENT)
Dept: CT IMAGING | Facility: HOSPITAL | Age: 80
Discharge: HOME | End: 2020-03-17
Attending: INTERNAL MEDICINE
Payer: MEDICARE

## 2020-03-17 DIAGNOSIS — R93.89 ABNORMAL CT SCAN: ICD-10-CM

## 2020-03-17 LAB — CREAT BLD-MCNC: 0.9 MG/DL (ref 0.7–1.3)

## 2020-03-17 PROCEDURE — 82565 ASSAY OF CREATININE: CPT

## 2020-03-17 PROCEDURE — 71260 CT THORAX DX C+: CPT | Performed by: INTERNAL MEDICINE

## 2020-03-18 ENCOUNTER — PATIENT MESSAGE (OUTPATIENT)
Dept: INTERNAL MEDICINE CLINIC | Facility: CLINIC | Age: 80
End: 2020-03-18

## 2020-03-18 ENCOUNTER — TELEPHONE (OUTPATIENT)
Dept: INTERNAL MEDICINE CLINIC | Facility: CLINIC | Age: 80
End: 2020-03-18

## 2020-03-18 DIAGNOSIS — R93.89 ABNORMAL CT OF THE CHEST: Primary | ICD-10-CM

## 2020-03-18 NOTE — TELEPHONE ENCOUNTER
----- Message from Mitch Kauffman MD sent at 3/18/2020  7:58 AM CDT -----  Refer   To  Dr Dr Sherita Beckford  -

## 2020-03-18 NOTE — TELEPHONE ENCOUNTER
From: Jessica Bautista  To: Mitch Kauffman MD  Sent: 3/18/2020 3:35 PM CDT  Subject: Non-Urgent Medical Question    Dr Guillermo William, At 7:20AM on April 15th I have a breathing test scheduled with Dr Sid Yu.    I will then see the Dr at [facility].

## 2020-03-24 RX ORDER — POTASSIUM CHLORIDE 750 MG/1
TABLET, EXTENDED RELEASE ORAL
Qty: 90 TABLET | Refills: 0 | Status: SHIPPED | OUTPATIENT
Start: 2020-03-24 | End: 2020-09-14

## 2020-03-24 RX ORDER — METOPROLOL SUCCINATE 25 MG/1
TABLET, EXTENDED RELEASE ORAL
Qty: 90 TABLET | Refills: 0 | Status: SHIPPED | OUTPATIENT
Start: 2020-03-24 | End: 2020-06-24

## 2020-04-20 ENCOUNTER — VIRTUAL PHONE E/M (OUTPATIENT)
Dept: INTERNAL MEDICINE CLINIC | Facility: CLINIC | Age: 80
End: 2020-04-20
Payer: MEDICARE

## 2020-04-20 ENCOUNTER — TELEPHONE (OUTPATIENT)
Dept: INTERNAL MEDICINE CLINIC | Facility: CLINIC | Age: 80
End: 2020-04-20

## 2020-04-20 DIAGNOSIS — R91.8 ABNORMAL CT SCAN, LUNG: Primary | ICD-10-CM

## 2020-04-20 DIAGNOSIS — R93.89 ABNORMAL CT OF THE CHEST: Primary | ICD-10-CM

## 2020-04-20 PROCEDURE — 99442 PHONE E/M BY PHYS 11-20 MIN: CPT | Performed by: INTERNAL MEDICINE

## 2020-04-20 NOTE — TELEPHONE ENCOUNTER
Please note that the following visit was completed using two-way, real-time interactive audio and/or video communication.   This has been done in good geraldine to provide continuity of care in the best interest of the provider-patient relationship, due to the

## 2020-04-20 NOTE — PROGRESS NOTES
Please note that the following visit was completed using two-way, real-time interactive audio and/or video communication.   This has been done in good geraldine to provide continuity of care in the best interest of the provider-patient relationship, due to th

## 2020-04-20 NOTE — PROGRESS NOTES
Virtual Telephone     Patient understands and accepts financial responsibility for any deductible, co-insurance and/or co-pays associated with this service.     Duration of the service: 15 minutes      Summary of topics discussed:             Harpal Iniguez

## 2020-04-23 ENCOUNTER — PATIENT MESSAGE (OUTPATIENT)
Dept: INTERNAL MEDICINE CLINIC | Facility: CLINIC | Age: 80
End: 2020-04-23

## 2020-04-24 NOTE — TELEPHONE ENCOUNTER
From: Mo Lowe  To:  Jo Rodriguez MD  Sent: 4/23/2020 2:58 PM CDT  Subject: Non-Urgent Medical Question    Dr Olson/April, I would greatly appreciate before we talk in four weeks if you would talk to Dr Lilian Murillo and ask  him to compare the 1-3

## 2020-04-27 NOTE — TELEPHONE ENCOUNTER
Per Dr. Jenniffer Ramírez: please leave message for Dr. Selena Hernandez to call on my cell. Message left with Dr. Selena Hernandez office.

## 2020-05-01 ENCOUNTER — TELEPHONE (OUTPATIENT)
Dept: INTERNAL MEDICINE CLINIC | Facility: CLINIC | Age: 80
End: 2020-05-01

## 2020-05-01 DIAGNOSIS — R91.8 ABNORMAL CT SCAN OF LUNG: Primary | ICD-10-CM

## 2020-05-01 PROCEDURE — 99441 PHONE E/M BY PHYS 5-10 MIN: CPT | Performed by: INTERNAL MEDICINE

## 2020-05-01 NOTE — TELEPHONE ENCOUNTER
Discussed  With  Patient   I  Talked  To patient    [id number] [address]   Also  wai  To  Dr Eddy Valle  -   These look benign but if patient feel more comfortable and we can just do another one I think is appropriate      I also have talked personally to pulmonary at

## 2020-05-08 ENCOUNTER — OFFICE VISIT (OUTPATIENT)
Dept: PODIATRY CLINIC | Facility: CLINIC | Age: 80
End: 2020-05-08
Payer: MEDICARE

## 2020-05-08 VITALS — TEMPERATURE: 98 F

## 2020-05-08 DIAGNOSIS — M77.9 BONE SPUR: ICD-10-CM

## 2020-05-08 DIAGNOSIS — M77.41 METATARSALGIA OF BOTH FEET: ICD-10-CM

## 2020-05-08 DIAGNOSIS — M20.5X1 HALLUX LIMITUS OF RIGHT FOOT: ICD-10-CM

## 2020-05-08 DIAGNOSIS — M20.11 HALLUX VALGUS OF RIGHT FOOT: ICD-10-CM

## 2020-05-08 DIAGNOSIS — M20.41 HAMMER TOES OF BOTH FEET: ICD-10-CM

## 2020-05-08 DIAGNOSIS — M20.42 HAMMER TOES OF BOTH FEET: ICD-10-CM

## 2020-05-08 DIAGNOSIS — M79.671 RIGHT FOOT PAIN: ICD-10-CM

## 2020-05-08 DIAGNOSIS — L84 PRE-ULCERATIVE CORN OR CALLOUS: Primary | ICD-10-CM

## 2020-05-08 DIAGNOSIS — M77.42 METATARSALGIA OF BOTH FEET: ICD-10-CM

## 2020-05-08 PROCEDURE — 99213 OFFICE O/P EST LOW 20 MIN: CPT | Performed by: PODIATRIST

## 2020-05-08 NOTE — PROGRESS NOTES
Chalo Puente is a 78year old male. Patient presents with:  Callus: right hallux - painful callus- pain scale 7/10 at worst - denies taking pain medication. HPI:     This 60-year-old male patient returns to clinic today in follow up.   He complains at Take  by mouth every other day. 400MG MAGNESIUM + ZINC 15MG.          Past Medical History:   Diagnosis Date   • Atherosclerosis of coronary artery 9/13 9/13 cath 40% ostial LM, 80-90% mid to distal LAD (2.5 x 26mm Resolute Integrity) EF 45%   • Back pa DINNER/ 4-5 nights week      Drug use: No      Sexual activity: Never    Other Topics      Concerns:        Caffeine Concern: Yes          some        Exercise: Yes          vigorous    Social History Narrative      Single      No kids      Retired sales ( feet       Plan: Discussed the clinical findings with the patient along with the treatment plan. Debrided the pre-ulcerative callus buildup on the plantar aspect of his right hallux without incident after which he expressed relief.   Accommodated his right

## 2020-06-23 ENCOUNTER — PATIENT MESSAGE (OUTPATIENT)
Dept: INTERNAL MEDICINE CLINIC | Facility: CLINIC | Age: 80
End: 2020-06-23

## 2020-06-23 NOTE — TELEPHONE ENCOUNTER
Diaz Adam,    Thank you for your message. Yes, please follow the pre-op guidelines sent to you by [facility]. They want you to hold the Asprin to decrease the chances of post- op bleeding since Asprin is a known blood thinner.      In regards to the \"

## 2020-06-23 NOTE — TELEPHONE ENCOUNTER
From: Jessica Bautista  To: Mitch Kauffman MD  Sent: 6/23/2020 9:17 AM CDT  Subject: Non-Urgent Medical Question    Dr Patricia Grant, On July 6th I’m scheduled to have Cataract Surgery at the [facility].  The infor-   mation I was sent recommended n

## 2020-06-24 RX ORDER — METOPROLOL SUCCINATE 25 MG/1
TABLET, EXTENDED RELEASE ORAL
Qty: 90 TABLET | Refills: 2 | Status: SHIPPED | OUTPATIENT
Start: 2020-06-24 | End: 2021-03-01

## 2020-07-02 ENCOUNTER — PATIENT MESSAGE (OUTPATIENT)
Dept: INTERNAL MEDICINE CLINIC | Facility: CLINIC | Age: 80
End: 2020-07-02

## 2020-07-02 NOTE — TELEPHONE ENCOUNTER
From: Federica Swann  To:  Fléix Chairez MD  Sent: 7/2/2020 1:00 PM CDT  Subject: Non-Urgent Medical Question    Dr Carlos Latham, For my Cataract Surgery the [facility] ask that my PCP look at the   three possible eye drops use

## 2020-07-14 DIAGNOSIS — M85.839 OSTEOPENIA OF FOREARM, UNSPECIFIED LATERALITY: ICD-10-CM

## 2020-07-14 DIAGNOSIS — R79.82 ELEVATED C-REACTIVE PROTEIN (CRP): ICD-10-CM

## 2020-07-14 DIAGNOSIS — Z00.00 ROUTINE GENERAL MEDICAL EXAMINATION AT A HEALTH CARE FACILITY: ICD-10-CM

## 2020-07-14 DIAGNOSIS — Z23 NEED FOR INFLUENZA VACCINATION: ICD-10-CM

## 2020-07-14 DIAGNOSIS — E78.00 PURE HYPERCHOLESTEROLEMIA: ICD-10-CM

## 2020-07-14 RX ORDER — ERGOCALCIFEROL 1.25 MG/1
CAPSULE ORAL
Qty: 6 CAPSULE | Refills: 1 | Status: SHIPPED | OUTPATIENT
Start: 2020-07-14 | End: 2021-03-01

## 2020-08-31 ENCOUNTER — NURSE ONLY (OUTPATIENT)
Dept: INTERNAL MEDICINE CLINIC | Facility: CLINIC | Age: 80
End: 2020-08-31
Payer: MEDICARE

## 2020-08-31 DIAGNOSIS — Z00.00 LABORATORY EXAMINATION ORDERED AS PART OF A ROUTINE GENERAL MEDICAL EXAMINATION: Primary | ICD-10-CM

## 2020-08-31 LAB
BILIRUB UR QL STRIP.AUTO: NEGATIVE
CLARITY UR REFRACT.AUTO: CLEAR
COLOR UR AUTO: COLORLESS
GLUCOSE UR STRIP.AUTO-MCNC: NEGATIVE MG/DL
KETONES UR STRIP.AUTO-MCNC: NEGATIVE MG/DL
LEUKOCYTE ESTERASE UR QL STRIP.AUTO: NEGATIVE
NITRITE UR QL STRIP.AUTO: NEGATIVE
PH UR STRIP.AUTO: 7 [PH] (ref 4.5–8)
PROT UR STRIP.AUTO-MCNC: NEGATIVE MG/DL
RBC UR QL AUTO: NEGATIVE
SP GR UR STRIP.AUTO: <1.005 (ref 1–1.03)
UROBILINOGEN UR STRIP.AUTO-MCNC: <2 MG/DL

## 2020-08-31 PROCEDURE — 92551 PURE TONE HEARING TEST AIR: CPT | Performed by: INTERNAL MEDICINE

## 2020-08-31 PROCEDURE — 93000 ELECTROCARDIOGRAM COMPLETE: CPT | Performed by: INTERNAL MEDICINE

## 2020-08-31 PROCEDURE — 81003 URINALYSIS AUTO W/O SCOPE: CPT | Performed by: INTERNAL MEDICINE

## 2020-08-31 NOTE — PROGRESS NOTES
*LOOKIN' BODY RESULTS    YES: x      NO:       REASON TEST NOT PERFORMED:        HEIGHT:5'6.5  WEIGHT: 142.4 lb  _____________________________________________________________________________  *VENIPUNCTURE    YES:  x     NO:        REASON VENIPUNCTURE NOT

## 2020-09-05 ENCOUNTER — MED REC SCAN ONLY (OUTPATIENT)
Dept: INTERNAL MEDICINE CLINIC | Facility: CLINIC | Age: 80
End: 2020-09-05

## 2020-09-05 NOTE — PROGRESS NOTES
HEALTH MAINTENANCE:      Immunization History   Administered Date(s) Administered   • DTAP 09/14/2014   • FLU VAC High Dose 65 YRS & Older PRSV Free (89188) 10/08/2014, 10/13/2015, 09/23/2019   • FLUAD High Dose 72 yr and older (11351) 10/09/2017, 09/24/20 Hz: Yes   Left Ear @ 40dBHL - 1000 Hz: Yes Right Ear @ 40 dBHL - 1000 Hz: Yes   Left Ear @ 40dBHL - 2000 Hz: Yes Right Ear @ 40 dBHL - 2000 Hz: Yes   Left Ear @ 40dBHL - 4000 Hz: No Right Ear @ 40 dBHL - 4000 Hz:  Yes

## 2020-09-10 ENCOUNTER — OFFICE VISIT (OUTPATIENT)
Dept: INTERNAL MEDICINE CLINIC | Facility: CLINIC | Age: 80
End: 2020-09-10
Payer: MEDICARE

## 2020-09-10 VITALS
WEIGHT: 148 LBS | DIASTOLIC BLOOD PRESSURE: 70 MMHG | BODY MASS INDEX: 23 KG/M2 | SYSTOLIC BLOOD PRESSURE: 122 MMHG | HEART RATE: 62 BPM | OXYGEN SATURATION: 99 % | TEMPERATURE: 97 F

## 2020-09-10 DIAGNOSIS — M85.839 OSTEOPENIA OF FOREARM, UNSPECIFIED LATERALITY: ICD-10-CM

## 2020-09-10 DIAGNOSIS — I10 ESSENTIAL HYPERTENSION: ICD-10-CM

## 2020-09-10 DIAGNOSIS — Z95.5 STATUS POST CORONARY ARTERY STENT PLACEMENT: ICD-10-CM

## 2020-09-10 DIAGNOSIS — Z86.69 HISTORY OF MIGRAINE: ICD-10-CM

## 2020-09-10 DIAGNOSIS — I25.10 CORONARY ARTERY DISEASE INVOLVING NATIVE CORONARY ARTERY OF NATIVE HEART WITHOUT ANGINA PECTORIS: ICD-10-CM

## 2020-09-10 DIAGNOSIS — I70.0 ARTERIOSCLEROSIS OF THORACIC AORTA (HCC): ICD-10-CM

## 2020-09-10 DIAGNOSIS — E78.41 ELEVATED LIPOPROTEIN(A): ICD-10-CM

## 2020-09-10 DIAGNOSIS — I65.23 ATHEROSCLEROSIS OF BOTH CAROTID ARTERIES: ICD-10-CM

## 2020-09-10 DIAGNOSIS — Q61.3 POLYCYSTIC KIDNEY: ICD-10-CM

## 2020-09-10 DIAGNOSIS — E78.00 PURE HYPERCHOLESTEROLEMIA: ICD-10-CM

## 2020-09-10 DIAGNOSIS — H26.9 CATARACT OF BOTH EYES, UNSPECIFIED CATARACT TYPE: ICD-10-CM

## 2020-09-10 DIAGNOSIS — N40.1 BENIGN NON-NODULAR PROSTATIC HYPERPLASIA WITH LOWER URINARY TRACT SYMPTOMS: ICD-10-CM

## 2020-09-10 DIAGNOSIS — Z98.890 H/O COLONOSCOPY: ICD-10-CM

## 2020-09-10 DIAGNOSIS — Z98.890 STATUS POST MOHS SURGERY: ICD-10-CM

## 2020-09-10 DIAGNOSIS — Z00.00 ROUTINE GENERAL MEDICAL EXAMINATION AT A HEALTH CARE FACILITY: Primary | ICD-10-CM

## 2020-09-10 DIAGNOSIS — C44.90 SKIN CANCER: ICD-10-CM

## 2020-09-10 PROBLEM — M72.2 BILATERAL PLANTAR FASCIITIS: Status: RESOLVED | Noted: 2019-09-23 | Resolved: 2020-09-10

## 2020-09-10 PROCEDURE — 99215 OFFICE O/P EST HI 40 MIN: CPT | Performed by: INTERNAL MEDICINE

## 2020-09-10 NOTE — PATIENT INSTRUCTIONS
Vitamin B12 oral  What is this medicine? CYANOCOBALAMIN (sye an oh koe BAL a min) is a man made form of vitamin B12. Vitamin B12 is essential in the development of healthy blood cells, nerve cells, and proteins in the body.  It also helps with the metabo Store at room temperature between 15 and 30 degrees C (59 and 85 degrees F). Protect from heat and light. Throw away any unused medicine after the expiration date. What should I tell my health care provider before I take this medicine?   They need to know You may need this test if your healthcare provider suspects that your vitamin B-12 level is low. A low level of vitamin B-12 is called vitamin B-12 deficiency.  You are more likely to have vitamin B-12 deficiency if you are an older adult, have a digestive · Malabsorption from inflammatory bowel disease or other causes  · Poor absorption because of surgery  · Tapeworm infection  · Too little intake of animal protein  · Folic acid deficiency  · Iron deficiency  If your levels are high, you may have:  · Liver © 1960-8693 [employer] [address] [facility], [facility] [place]. All rights reserved. This information is not intended as a substitute for professional medical care. Always follow your healthcare professional's instructions.

## 2020-09-10 NOTE — PROGRESS NOTES
HPI:    Patient ID: Carson Mckeon is a [de-identified]year old male. HPI DEAR Jose Angel    IT WAS NICE SEEING YOU FOR YOUR WELLNESS VISIT ON 9/10/2020            Review of Systems    HPI:    Patient ID: Carson Mckeon is a [de-identified]year old male.     History of Present Illne \"Cognitive Assessment\" under Medicare Assessment section in Charting, test patient and document. Then, refresh your progress note to see your input here.   Cognitive Assessment                                     Immunizations:  Immunization History an ultrasound there palpable renal function remains excellent. Negative for dysuria and hematuria. Urinary stream normal.  Denies history of kidney stones.   Flomax BPH no lightheadedness has been evaluated for kidney cyst    Musculoskeletal: Lumbar lower History:  Past Medical History:   Diagnosis Date   • Atherosclerosis of coronary artery 9/13 9/13 cath 40% ostial LM, 80-90% mid to distal LAD (2.5 x 26mm Resolute Integrity) EF 45%   • Back pain    • BPH (benign prostatic hypertrophy)    • Cataract file    Tobacco Use      Smoking status: Never Smoker      Smokeless tobacco: Never Used    Substance and Sexual Activity      Alcohol use:  Yes        Alcohol/week: 0.0 standard drinks        Comment: GLASS OF WINE WITH DINNER/ 4-5 nights week      Drug us missed 4000 frequency 40 dB left ear and 500 dB left ear 40 dB patient relatively asymptomatic in all right ear completely normal     Eyes: Conjunctivae and EOM are normal. PERRLA. No scleral icterus.    Visual Acuity                           Neck: Normal will get colder darker and potentially more isolated. You do plan the trip back to [facility] so next week. We looked at your labs a urine test was negative there is no evidence of exposure or illness from COVID 2.     He continues have an elevate cancer screening 2014 no need to pursue at present time      #6. Immunizations I believe you have completed Shingrix last fall.   Tdap 2014 repeat every 10 years both pneumonia shots and have been given and flu vaccine you will get done next week at [facility] 30 tablet 0   • omega-3 fatty acids (FISH OIL) 1000 MG Oral Cap Take 1,000 mg by mouth 2 (two) times daily. • Selenium 200 MCG Oral Cap Take 100 mcg by mouth.        • Glucosamine-Chondroit-Vit C-Mn (GLUCOSAMINE CHONDR 1500 COMPLX OR) Take by mouth 2

## 2020-09-13 ENCOUNTER — PATIENT MESSAGE (OUTPATIENT)
Dept: INTERNAL MEDICINE CLINIC | Facility: CLINIC | Age: 80
End: 2020-09-13

## 2020-09-13 DIAGNOSIS — Z23 NEED FOR INFLUENZA VACCINATION: ICD-10-CM

## 2020-09-13 DIAGNOSIS — E78.00 PURE HYPERCHOLESTEROLEMIA: ICD-10-CM

## 2020-09-13 DIAGNOSIS — Z00.00 ROUTINE GENERAL MEDICAL EXAMINATION AT A HEALTH CARE FACILITY: ICD-10-CM

## 2020-09-13 DIAGNOSIS — M85.839 OSTEOPENIA OF FOREARM, UNSPECIFIED LATERALITY: ICD-10-CM

## 2020-09-13 DIAGNOSIS — R79.82 ELEVATED C-REACTIVE PROTEIN (CRP): ICD-10-CM

## 2020-09-14 RX ORDER — ERGOCALCIFEROL 1.25 MG/1
CAPSULE ORAL
Qty: 6 CAPSULE | Refills: 1 | Status: CANCELLED | OUTPATIENT
Start: 2020-09-14

## 2020-09-14 RX ORDER — POTASSIUM CHLORIDE 750 MG/1
10 TABLET, EXTENDED RELEASE ORAL EVERY OTHER DAY
Qty: 90 TABLET | Refills: 1 | Status: SHIPPED | OUTPATIENT
Start: 2020-09-14 | End: 2020-09-15

## 2020-09-14 RX ORDER — ATORVASTATIN CALCIUM 40 MG/1
40 TABLET, FILM COATED ORAL NIGHTLY
Qty: 90 TABLET | Refills: 1 | Status: SHIPPED | OUTPATIENT
Start: 2020-09-14 | End: 2021-03-01

## 2020-09-14 RX ORDER — AMLODIPINE BESYLATE 5 MG/1
TABLET ORAL
Qty: 90 TABLET | Refills: 1 | Status: SHIPPED | OUTPATIENT
Start: 2020-09-14 | End: 2021-03-01

## 2020-09-14 RX ORDER — AMLODIPINE BESYLATE 2.5 MG/1
2.5 TABLET ORAL DAILY
Qty: 90 TABLET | Refills: 1 | Status: SHIPPED | OUTPATIENT
Start: 2020-09-14 | End: 2021-03-19

## 2020-09-14 RX ORDER — ATORVASTATIN CALCIUM 40 MG/1
40 TABLET, FILM COATED ORAL NIGHTLY
Qty: 90 TABLET | Refills: 1 | Status: SHIPPED | OUTPATIENT
Start: 2020-09-14 | End: 2020-09-15

## 2020-09-14 RX ORDER — POTASSIUM CHLORIDE 750 MG/1
TABLET, EXTENDED RELEASE ORAL
Qty: 90 TABLET | Refills: 1 | Status: SHIPPED | OUTPATIENT
Start: 2020-09-14 | End: 2021-03-19

## 2020-09-14 NOTE — TELEPHONE ENCOUNTER
From: Fernie Norman  To: Vick Sanchez MD  Sent: 9/13/2020 10:51 AM CDT  Subject: Non-Urgent Medical Question    Dr Jacobo Pleitez, When I met with Dr ANDRES. last week I failed to ask for my annual written prescriptions.  I will need the   Following with the

## 2020-09-28 ENCOUNTER — PATIENT MESSAGE (OUTPATIENT)
Dept: INTERNAL MEDICINE CLINIC | Facility: CLINIC | Age: 80
End: 2020-09-28

## 2020-09-28 NOTE — TELEPHONE ENCOUNTER
REX Coffey,    I spoke with both Dulce and Dr. Miracle Nava. They said it would be fine for you to complete your Chest CT on 11/10. Dr. Ananya Bryant would be happy to review the results with you after it is done.      If there is anything else we can help you with, please l

## 2020-09-28 NOTE — TELEPHONE ENCOUNTER
From: Federica Swann  To: Félix Chairez MD  Sent: 9/28/2020 2:01 PM CDT  Subject: Non-Urgent Medical Question    Dr Denise Wharton, I kept the Chest CT scan as was scheduled for November 10th.  According to CS they are booked   now until that time at [facility]

## 2020-10-08 ENCOUNTER — HOSPITAL ENCOUNTER (OUTPATIENT)
Dept: ULTRASOUND IMAGING | Facility: HOSPITAL | Age: 80
Discharge: HOME | End: 2020-10-08
Attending: INTERNAL MEDICINE
Payer: MEDICARE

## 2020-10-08 DIAGNOSIS — Q61.3 POLYCYSTIC KIDNEY: ICD-10-CM

## 2020-10-08 PROCEDURE — 76770 US EXAM ABDO BACK WALL COMP: CPT | Performed by: INTERNAL MEDICINE

## 2020-10-29 ENCOUNTER — HOSPITAL ENCOUNTER (OUTPATIENT)
Dept: CV DIAGNOSTICS | Facility: HOSPITAL | Age: 80
Discharge: HOME | End: 2020-10-29
Attending: INTERNAL MEDICINE
Payer: MEDICARE

## 2020-10-29 DIAGNOSIS — I25.10 CORONARY ARTERY DISEASE INVOLVING NATIVE CORONARY ARTERY OF NATIVE HEART WITHOUT ANGINA PECTORIS: ICD-10-CM

## 2020-10-29 DIAGNOSIS — I99.8 SILENT ISCHEMIA: ICD-10-CM

## 2020-10-29 PROCEDURE — 93018 CV STRESS TEST I&R ONLY: CPT | Performed by: INTERNAL MEDICINE

## 2020-10-29 PROCEDURE — 78452 HT MUSCLE IMAGE SPECT MULT: CPT | Performed by: INTERNAL MEDICINE

## 2020-10-29 PROCEDURE — 93017 CV STRESS TEST TRACING ONLY: CPT | Performed by: INTERNAL MEDICINE

## 2020-11-10 ENCOUNTER — HOSPITAL ENCOUNTER (OUTPATIENT)
Dept: CT IMAGING | Facility: HOSPITAL | Age: 80
Discharge: HOME | End: 2020-11-10
Attending: INTERNAL MEDICINE
Payer: MEDICARE

## 2020-11-10 DIAGNOSIS — R91.8 ABNORMAL CT SCAN OF LUNG: ICD-10-CM

## 2020-11-10 PROCEDURE — 71250 CT THORAX DX C-: CPT | Performed by: INTERNAL MEDICINE

## 2020-12-17 DIAGNOSIS — Z00.00 ROUTINE GENERAL MEDICAL EXAMINATION AT A HEALTH CARE FACILITY: ICD-10-CM

## 2020-12-17 RX ORDER — TAMSULOSIN HYDROCHLORIDE 0.4 MG/1
CAPSULE ORAL
Qty: 90 CAPSULE | Refills: 2 | Status: SHIPPED | OUTPATIENT
Start: 2020-12-17 | End: 2021-09-02

## 2020-12-17 NOTE — TELEPHONE ENCOUNTER
CPE 9/10/20    Future Appointments   Date Time Provider Katherine Cowan   9/1/2021  1:30 PM John Alexander 25 1 [address] [facility]   9/15/2021 10:00 AM Clemencia Smallwood MD [facility]     Med refilled

## 2021-02-01 DIAGNOSIS — Z23 NEED FOR VACCINATION: ICD-10-CM

## 2021-02-02 ENCOUNTER — IMMUNIZATION (OUTPATIENT)
Dept: LAB | Age: 81
End: 2021-02-02
Attending: HOSPITALIST
Payer: MEDICARE

## 2021-02-02 DIAGNOSIS — Z23 NEED FOR VACCINATION: Primary | ICD-10-CM

## 2021-02-02 PROCEDURE — 0001A SARSCOV2 VAC 30MCG/0.3ML IM: CPT

## 2021-02-23 ENCOUNTER — IMMUNIZATION (OUTPATIENT)
Dept: LAB | Age: 81
End: 2021-02-23
Attending: HOSPITALIST
Payer: MEDICARE

## 2021-02-23 DIAGNOSIS — Z23 NEED FOR VACCINATION: Primary | ICD-10-CM

## 2021-02-23 PROCEDURE — 0002A SARSCOV2 VAC 30MCG/0.3ML IM: CPT

## 2021-03-01 ENCOUNTER — TELEPHONE (OUTPATIENT)
Dept: INTERNAL MEDICINE CLINIC | Facility: CLINIC | Age: 81
End: 2021-03-01

## 2021-03-01 DIAGNOSIS — E78.00 PURE HYPERCHOLESTEROLEMIA: ICD-10-CM

## 2021-03-01 DIAGNOSIS — R79.82 ELEVATED C-REACTIVE PROTEIN (CRP): ICD-10-CM

## 2021-03-01 DIAGNOSIS — Z23 NEED FOR INFLUENZA VACCINATION: ICD-10-CM

## 2021-03-01 DIAGNOSIS — Z00.00 ROUTINE GENERAL MEDICAL EXAMINATION AT A HEALTH CARE FACILITY: ICD-10-CM

## 2021-03-01 DIAGNOSIS — M85.839 OSTEOPENIA OF FOREARM, UNSPECIFIED LATERALITY: ICD-10-CM

## 2021-03-01 RX ORDER — METOPROLOL SUCCINATE 25 MG/1
25 TABLET, EXTENDED RELEASE ORAL DAILY
Qty: 90 TABLET | Refills: 2 | Status: SHIPPED | OUTPATIENT
Start: 2021-03-01 | End: 2021-09-13

## 2021-03-01 RX ORDER — ERGOCALCIFEROL 1.25 MG/1
CAPSULE ORAL
Qty: 6 CAPSULE | Refills: 1 | Status: SHIPPED | OUTPATIENT
Start: 2021-03-01 | End: 2021-04-08

## 2021-03-01 RX ORDER — AMLODIPINE BESYLATE 5 MG/1
5 TABLET ORAL DAILY
Qty: 90 TABLET | Refills: 2 | Status: SHIPPED | OUTPATIENT
Start: 2021-03-01 | End: 2021-04-08

## 2021-03-01 RX ORDER — ATORVASTATIN CALCIUM 40 MG/1
40 TABLET, FILM COATED ORAL NIGHTLY
Qty: 90 TABLET | Refills: 2 | Status: SHIPPED | OUTPATIENT
Start: 2021-03-01 | End: 2021-12-09

## 2021-03-04 NOTE — PROGRESS NOTES
Discussed with radiology Dr. Owen Harvey radiographs and CT    Patient clinically fine follow-up chest x-ray is okay he believes that we still should do the CT at about 8-week interval to 12 weeks because of the pneumonic infiltrates.   Will reach out to patien Patient scheduled a follow up for tomorrow to go over her ct results  In the meantime she wanted you to be aware she coughed up blood once Monday morning when she woke up  She states she only did once and has not since  Can you please advise if you would like to do something before tomorrow's appointment? Thank you  45 y/o F with no reported PMH presents to the ED with c/o laceration to the medial aspect of the left thumb x tonight. Patient reports she was washing the dishes and got cut by something sharp. Denies paresthesias, motor/sensory deficits. Last tdap is unknown. PE as noted above. laceration was cleaned and repaired. tdap given. keflex started. Patient stable for dc

## 2021-03-22 ENCOUNTER — LAB ENCOUNTER (OUTPATIENT)
Dept: LAB | Facility: HOSPITAL | Age: 81
End: 2021-03-22
Attending: INTERNAL MEDICINE
Payer: MEDICARE

## 2021-03-22 DIAGNOSIS — Z01.812 PRE-PROCEDURE LAB EXAM: ICD-10-CM

## 2021-03-23 LAB — SARS-COV-2 RNA RESP QL NAA+PROBE: NOT DETECTED

## 2021-03-25 ENCOUNTER — HOSPITAL ENCOUNTER (OUTPATIENT)
Dept: GENERAL RADIOLOGY | Facility: HOSPITAL | Age: 81
Discharge: HOME | End: 2021-03-25
Attending: INTERNAL MEDICINE
Payer: MEDICARE

## 2021-03-25 DIAGNOSIS — R14.2 BELCHING: ICD-10-CM

## 2021-03-25 DIAGNOSIS — K21.9 GASTROESOPHAGEAL REFLUX DISEASE, UNSPECIFIED WHETHER ESOPHAGITIS PRESENT: ICD-10-CM

## 2021-03-25 PROCEDURE — 74246 X-RAY XM UPR GI TRC 2CNTRST: CPT | Performed by: INTERNAL MEDICINE

## 2021-04-08 ENCOUNTER — OFFICE VISIT (OUTPATIENT)
Dept: INTERNAL MEDICINE CLINIC | Facility: CLINIC | Age: 81
End: 2021-04-08
Payer: MEDICARE

## 2021-04-08 VITALS
SYSTOLIC BLOOD PRESSURE: 128 MMHG | RESPIRATION RATE: 16 BRPM | WEIGHT: 142 LBS | HEART RATE: 74 BPM | TEMPERATURE: 97 F | OXYGEN SATURATION: 97 % | BODY MASS INDEX: 22.29 KG/M2 | DIASTOLIC BLOOD PRESSURE: 62 MMHG | HEIGHT: 67 IN

## 2021-04-08 DIAGNOSIS — I10 HYPERTENSION, UNSPECIFIED TYPE: ICD-10-CM

## 2021-04-08 DIAGNOSIS — E78.5 DYSLIPIDEMIA: Primary | ICD-10-CM

## 2021-04-08 DIAGNOSIS — K44.9 HIATAL HERNIA WITH GERD: ICD-10-CM

## 2021-04-08 DIAGNOSIS — I25.10 CORONARY ARTERY DISEASE INVOLVING NATIVE CORONARY ARTERY OF NATIVE HEART WITHOUT ANGINA PECTORIS: ICD-10-CM

## 2021-04-08 DIAGNOSIS — K21.9 HIATAL HERNIA WITH GERD: ICD-10-CM

## 2021-04-08 PROCEDURE — 99214 OFFICE O/P EST MOD 30 MIN: CPT | Performed by: INTERNAL MEDICINE

## 2021-04-08 RX ORDER — MULTIVIT-MIN/IRON/FOLIC ACID/K 18-600-40
CAPSULE ORAL DAILY
Qty: 30 CAPSULE | Refills: 0 | COMMUNITY
Start: 2021-04-08 | End: 2021-09-13

## 2021-04-08 RX ORDER — MULTIVIT,TX WITH IRON,MINERALS
TABLET, EXTENDED RELEASE ORAL
COMMUNITY

## 2021-04-08 NOTE — PROGRESS NOTES
HPI/Subjective:   Patient ID: Chapis Armas is a [de-identified]year old male. new pt from (retired)    Stable heart disease, reviewed meds      History/Other:   Review of Systems   Constitutional: Negative.     Respiratory:        COPD on last CT 11/2020 no 600+D OR) Take by mouth 2 (two) times daily.          Allergies:  Codeine                 OTHER (SEE COMMENTS)    Comment:Throat  tightness  Hctz [Hydrochloroth*    UNKNOWN  Pepcid [Famotidine]     OTHER (SEE COMMENTS)    Comment:neck and shoulder pains

## 2021-04-12 ENCOUNTER — LAB ENCOUNTER (OUTPATIENT)
Dept: LAB | Age: 81
End: 2021-04-12
Attending: INTERNAL MEDICINE
Payer: MEDICARE

## 2021-04-12 DIAGNOSIS — E78.5 DYSLIPIDEMIA: ICD-10-CM

## 2021-04-12 DIAGNOSIS — I10 HYPERTENSION, UNSPECIFIED TYPE: ICD-10-CM

## 2021-04-12 PROCEDURE — 36415 COLL VENOUS BLD VENIPUNCTURE: CPT

## 2021-04-12 PROCEDURE — 80061 LIPID PANEL: CPT

## 2021-04-12 PROCEDURE — 80053 COMPREHEN METABOLIC PANEL: CPT

## 2021-06-01 ENCOUNTER — MED REC SCAN ONLY (OUTPATIENT)
Dept: INTERNAL MEDICINE CLINIC | Facility: CLINIC | Age: 81
End: 2021-06-01

## 2021-06-08 ENCOUNTER — TELEPHONE (OUTPATIENT)
Dept: INTERNAL MEDICINE CLINIC | Facility: CLINIC | Age: 81
End: 2021-06-08

## 2021-06-08 RX ORDER — AMLODIPINE BESYLATE 5 MG/1
TABLET ORAL
Qty: 135 TABLET | Refills: 1 | Status: SHIPPED | OUTPATIENT
Start: 2021-06-08 | End: 2021-06-10

## 2021-06-10 ENCOUNTER — PATIENT MESSAGE (OUTPATIENT)
Dept: INTERNAL MEDICINE CLINIC | Facility: CLINIC | Age: 81
End: 2021-06-10

## 2021-06-10 RX ORDER — AMLODIPINE BESYLATE 5 MG/1
TABLET ORAL
Qty: 135 TABLET | Refills: 1 | Status: SHIPPED | OUTPATIENT
Start: 2021-06-10 | End: 2021-06-10

## 2021-06-10 RX ORDER — AMLODIPINE BESYLATE 5 MG/1
TABLET ORAL
Qty: 90 TABLET | Refills: 0 | COMMUNITY
Start: 2021-06-10 | End: 2021-09-13

## 2021-06-10 RX ORDER — AMLODIPINE BESYLATE 2.5 MG/1
2.5 TABLET ORAL
Qty: 90 TABLET | Refills: 1 | Status: SHIPPED | OUTPATIENT
Start: 2021-06-10 | End: 2021-09-13

## 2021-06-10 NOTE — TELEPHONE ENCOUNTER
From: Jina Naranjo  To: Blade Cohen MD  Sent: 6/10/2021 11:23 AM CDT  Subject: Prescription Question    Dr Cherelle Da Silva, On Monday, June 6th I ask at the [facility] in [address]   to have 6 prescriptions filled.  I had no refills available for the 2

## 2021-07-01 ENCOUNTER — MED REC SCAN ONLY (OUTPATIENT)
Dept: INTERNAL MEDICINE CLINIC | Facility: CLINIC | Age: 81
End: 2021-07-01

## 2021-09-01 ENCOUNTER — HOSPITAL ENCOUNTER (OUTPATIENT)
Dept: ULTRASOUND IMAGING | Facility: HOSPITAL | Age: 81
Discharge: HOME | End: 2021-09-01
Attending: INTERNAL MEDICINE
Payer: MEDICARE

## 2021-09-01 DIAGNOSIS — I65.23 ATHEROSCLEROSIS OF BOTH CAROTID ARTERIES: ICD-10-CM

## 2021-09-01 PROCEDURE — 93880 EXTRACRANIAL BILAT STUDY: CPT | Performed by: INTERNAL MEDICINE

## 2021-09-02 ENCOUNTER — NURSE ONLY (OUTPATIENT)
Dept: INTERNAL MEDICINE CLINIC | Facility: CLINIC | Age: 81
End: 2021-09-02
Payer: MEDICARE

## 2021-09-02 DIAGNOSIS — Z00.00 LABORATORY EXAMINATION ORDERED AS PART OF A ROUTINE GENERAL MEDICAL EXAMINATION: Primary | ICD-10-CM

## 2021-09-02 DIAGNOSIS — Z00.00 ROUTINE GENERAL MEDICAL EXAMINATION AT A HEALTH CARE FACILITY: ICD-10-CM

## 2021-09-02 LAB
AMB EXT CHOL/HDL RATIO: 1.9
AMB EXT CHOLESTEROL, TOTAL: 132 MG/DL
AMB EXT CMP ALT: 22 U/L
AMB EXT CMP AST: 30 U/L
AMB EXT CREATININE: 0.91 MG/DL
AMB EXT GLUCOSE: 86 MG/DL
AMB EXT HDL CHOLESTEROL: 71 MG/DL
AMB EXT HEMATOCRIT: 43.8
AMB EXT HEMOGLOBIN: 14.8
AMB EXT HGBA1C: 5.6 %
AMB EXT LDL CHOLESTEROL, DIRECT: 50 MG/DL
AMB EXT MCV: 97.3
AMB EXT NON HDL CHOL: 61 MG/DL
AMB EXT POSTASSIUM: 4.1 MMOL/L
AMB EXT PSA SCREEN: 2.88 NG/ML
AMB EXT SODIUM: 142 MMOL/L
AMB EXT TRIGLYCERIDES: 37 MG/DL
AMB EXT TSH: 2.6 MIU/ML
AMB EXT WBC: 5.4 X10(3)UL
BILIRUB UR QL STRIP.AUTO: NEGATIVE
CLARITY UR REFRACT.AUTO: CLEAR
GLUCOSE UR STRIP.AUTO-MCNC: NEGATIVE MG/DL
KETONES UR STRIP.AUTO-MCNC: NEGATIVE MG/DL
LEUKOCYTE ESTERASE UR QL STRIP.AUTO: NEGATIVE
NITRITE UR QL STRIP.AUTO: NEGATIVE
PH UR STRIP.AUTO: 7 [PH] (ref 5–8)
PROT UR STRIP.AUTO-MCNC: NEGATIVE MG/DL
RBC UR QL AUTO: NEGATIVE
SP GR UR STRIP.AUTO: 1 (ref 1–1.03)
UROBILINOGEN UR STRIP.AUTO-MCNC: <2 MG/DL

## 2021-09-02 PROCEDURE — 93923 UPR/LXTR ART STDY 3+ LVLS: CPT | Performed by: INTERNAL MEDICINE

## 2021-09-02 PROCEDURE — 92551 PURE TONE HEARING TEST AIR: CPT | Performed by: INTERNAL MEDICINE

## 2021-09-02 PROCEDURE — 81003 URINALYSIS AUTO W/O SCOPE: CPT | Performed by: INTERNAL MEDICINE

## 2021-09-02 RX ORDER — TAMSULOSIN HYDROCHLORIDE 0.4 MG/1
CAPSULE ORAL
Qty: 90 CAPSULE | Refills: 0 | Status: SHIPPED | OUTPATIENT
Start: 2021-09-02 | End: 2021-09-13 | Stop reason: DRUGHIGH

## 2021-09-02 NOTE — PROGRESS NOTES
*BODY COMPOSITION:    YES:x       NO:       REASON TEST NOT PERFORMED:   _____________________________________________________________________________  *VENIPUNCTURE    YES: x      NO:        REASON VENIPUNCTURE NOT PERFORMED:      LEFT A/C:     LEFT HAND:

## 2021-09-02 NOTE — TELEPHONE ENCOUNTER
CPe 9/10/20    Future Appointments   Date Time Provider Katherine Cowan   9/13/2021 10:15 AM Law Cameron MD [facility] [place]   9/15/2021 10:20 AM Madhav Reynolds MD [facility]     Refilled

## 2021-09-07 RX ORDER — POTASSIUM CHLORIDE 750 MG/1
10 TABLET, EXTENDED RELEASE ORAL EVERY OTHER DAY
Qty: 90 TABLET | Refills: 0 | OUTPATIENT
Start: 2021-09-07

## 2021-09-10 NOTE — PROGRESS NOTES
HEALTH MAINTENANCE:      Immunization History   Administered Date(s) Administered   • Covid-19 Vaccine Pfizer 30 mcg/0.3 ml 02/02/2021, 02/23/2021   • DTAP 09/14/2014   • FLU VAC High Dose 72 YRS & Older PRSV Free (53850) 10/08/2014, 10/13/2015, 09/23/2019 2000 Hz: Yes   Left Ear @ 25dBHL - 4000 Hz: Yes Right Ear @ 25dBHL - 4000 Hz: No   Left Ear @ 40dBHL - 500 Hz: No Right Ear @ 40 dBHL - 500 Hz: Yes   Left Ear @ 40dBHL - 1000 Hz: Yes Right Ear @ 40 dBHL - 1000 Hz: Yes   Left Ear @ 40dBHL - 2000 Hz: Yes Rig

## 2021-09-13 ENCOUNTER — OFFICE VISIT (OUTPATIENT)
Dept: INTERNAL MEDICINE CLINIC | Facility: CLINIC | Age: 81
End: 2021-09-13
Payer: MEDICARE

## 2021-09-13 VITALS
WEIGHT: 143 LBS | HEART RATE: 68 BPM | RESPIRATION RATE: 16 BRPM | BODY MASS INDEX: 22.44 KG/M2 | HEIGHT: 67 IN | SYSTOLIC BLOOD PRESSURE: 130 MMHG | OXYGEN SATURATION: 98 % | DIASTOLIC BLOOD PRESSURE: 60 MMHG

## 2021-09-13 DIAGNOSIS — Z13.820 SCREENING FOR OSTEOPOROSIS: ICD-10-CM

## 2021-09-13 DIAGNOSIS — Z00.01 ENCOUNTER FOR GENERAL ADULT MEDICAL EXAMINATION WITH ABNORMAL FINDINGS: Primary | ICD-10-CM

## 2021-09-13 PROCEDURE — G0439 PPPS, SUBSEQ VISIT: HCPCS | Performed by: INTERNAL MEDICINE

## 2021-09-13 RX ORDER — ERGOCALCIFEROL 1.25 MG/1
50000 CAPSULE ORAL
Qty: 6 CAPSULE | Refills: 3 | Status: SHIPPED | OUTPATIENT
Start: 2021-09-13 | End: 2021-12-09

## 2021-09-13 RX ORDER — MULTIVITAMIN/IRON/FOLIC ACID 18MG-0.4MG
200 TABLET ORAL EVERY OTHER DAY
COMMUNITY

## 2021-09-13 RX ORDER — POTASSIUM CHLORIDE 750 MG/1
10 TABLET, FILM COATED, EXTENDED RELEASE ORAL EVERY OTHER DAY
Qty: 45 TABLET | Refills: 3 | Status: SHIPPED | OUTPATIENT
Start: 2021-09-13 | End: 2021-12-09

## 2021-09-13 RX ORDER — METOPROLOL SUCCINATE 25 MG/1
12.5 TABLET, EXTENDED RELEASE ORAL 2 TIMES DAILY
Qty: 90 TABLET | Refills: 2 | COMMUNITY
Start: 2021-09-13 | End: 2021-12-09

## 2021-09-13 RX ORDER — TAMSULOSIN HYDROCHLORIDE 0.4 MG/1
CAPSULE ORAL DAILY
COMMUNITY
End: 2021-12-09

## 2021-09-13 RX ORDER — POTASSIUM CHLORIDE 750 MG/1
10 TABLET, EXTENDED RELEASE ORAL EVERY OTHER DAY
COMMUNITY
Start: 2021-06-07

## 2021-09-13 RX ORDER — AMLODIPINE BESYLATE 5 MG/1
TABLET ORAL
Qty: 180 TABLET | Refills: 3 | Status: SHIPPED | OUTPATIENT
Start: 2021-09-13 | End: 2021-12-09

## 2021-09-13 NOTE — PROGRESS NOTES
HEALTH MAINTENANCE:      Immunization History   Administered Date(s) Administered   • Covid-19 Vaccine Pfizer 30 mcg/0.3 ml 02/02/2021, 02/23/2021   • DTAP 09/14/2014   • FLU VAC High Dose 72 YRS & Older PRSV Free (08278) 10/08/2014, 10/13/2015, 09/23/2019 2000 Hz: Yes Right Ear @ 40 dBHL - 2000 Hz: Yes   Left Ear @ 40dBHL - 4000 Hz: Yes Right Ear @ 40 dBHL - 4000 Hz:  Yes

## 2021-09-18 NOTE — PROGRESS NOTES
Subjective:   Patient ID: Jessica Bautista is a 80year old male. CPE      CPE see below      History/Other:   Review of Systems   Constitutional: Negative. HENT: Negative. Eyes: Negative. Respiratory: Positive for shortness of breath (exertion). mouth 2 (two) times daily. • Calcium Carbonate-Vitamin D (CALTRATE 600+D OR) Take by mouth 2 (two) times daily.          Allergies:  Codeine                 OTHER (SEE COMMENTS)    Comment:Throat  tightness  Hctz [Hydrochloroth*    UNKNOWN  Pepcid [Fa fasciitis 9/23/2019   • BPH (benign prostatic hypertrophy)    • CAD (coronary artery disease) 9/24/2013    LAD   STENTED   JONAS  -  9/2013 DR ZABALA  -  45%  LEFT   MAIN ALSO   SEEN   LV  45%     • Carotid artery plaque 9/14/2015     15-49%    9/2019     30 Smoking status: Never Smoker      Smokeless tobacco: Never Used    Vaping Use      Vaping Use: Never used    Substance and Sexual Activity      Alcohol use:  Yes        Alcohol/week: 0.0 standard drinks        Comment: GLASS OF WINE WITH DINNER/ 4-5 nights file      Physically Abused: Not on file      Sexually Abused: Not on file  Housing Stability:       Unable to Pay for Housing in the Last Year: Not on file      Number of Places Lived in the Last Year: Not on file      Unstable Housing in the Last Year: N Date: 09/02/2021  Value: 71          Ref range: mg/dL              Status: Final  Direct LDL                                    Date: 09/02/2021  Value: 50          Ref range: mg/dL              Status: Final  Triglycerides abnormality, obesity, pre-diabetic): Not present  Mini-Mental Examination: Normal in conversation  Nutritional Screen: Reviewed and discussed. Mediterranean diet recomended.     SAFE Questions (Domestic Violence): Negative  Advance Directives:    (Scanned d DENSITOMETRY (CPT=39328)     SUMMARY:    Mr Everlyn Sandhoff nice mumtaz see you. You are in decent health with HTN, Lipids, Sugar, weight, Omegas, TMAO good shape. Mild inc CRP-ignore. planning nuclear stress thru [facility] screen for colon cancer, Dexa for B

## 2021-12-09 RX ORDER — METOPROLOL SUCCINATE 25 MG/1
12.5 TABLET, EXTENDED RELEASE ORAL DAILY
Qty: 45 TABLET | Refills: 3 | Status: SHIPPED | OUTPATIENT
Start: 2021-12-09 | End: 2021-12-13

## 2021-12-09 RX ORDER — ATORVASTATIN CALCIUM 40 MG/1
40 TABLET, FILM COATED ORAL NIGHTLY
Qty: 90 TABLET | Refills: 3 | Status: SHIPPED | OUTPATIENT
Start: 2021-12-09

## 2021-12-09 RX ORDER — TAMSULOSIN HYDROCHLORIDE 0.4 MG/1
CAPSULE ORAL
Qty: 90 CAPSULE | Refills: 3 | Status: SHIPPED | OUTPATIENT
Start: 2021-12-09

## 2021-12-20 ENCOUNTER — TELEPHONE (OUTPATIENT)
Dept: INTERNAL MEDICINE CLINIC | Facility: CLINIC | Age: 81
End: 2021-12-20

## 2021-12-20 NOTE — TELEPHONE ENCOUNTER
Please remove the Dexa order from patient's chart. He had this done at [address]. Patient had the test done on 9/24/21    No call back needed.

## 2022-03-01 ENCOUNTER — TELEPHONE (OUTPATIENT)
Dept: INTERNAL MEDICINE CLINIC | Facility: CLINIC | Age: 82
End: 2022-03-01

## 2022-03-01 NOTE — TELEPHONE ENCOUNTER
Spoke with Gorge Archer, PT. Kinza Younger Patient currently receiving physical therapy, not progressing like he hoped. Suspects bulging or herniated disc. Had MR in 2019. Discussed possible pain meds. Patient hesitant to take. Amrita Zapien can be reached at: [phone].

## 2022-03-01 NOTE — TELEPHONE ENCOUNTER
Giovanny Olmedo, a p/t, called and asks to speak to a nurse about patient's condition which he says is not improving.      [phone]

## 2022-03-02 RX ORDER — METOPROLOL SUCCINATE 25 MG/1
12.5 TABLET, EXTENDED RELEASE ORAL 2 TIMES DAILY
Qty: 90 TABLET | Refills: 3 | Status: SHIPPED | OUTPATIENT
Start: 2022-03-02

## 2022-03-02 NOTE — TELEPHONE ENCOUNTER
Stop PT, repeat MRI lumbar spine without at [facility] compare 2019, then OV with me 2-3 days after MRI done.

## 2022-03-02 NOTE — TELEPHONE ENCOUNTER
Spoke with patient. Will schedule MRI. Currently takes Tylenol 500mg daily for pain, provides a little relief. Does not want to take Ibuprofen because on b/p meds. Could he try a different anti-inflammatory until he gets MRI performed.

## 2022-03-07 ENCOUNTER — TELEPHONE (OUTPATIENT)
Dept: INTERNAL MEDICINE CLINIC | Facility: CLINIC | Age: 82
End: 2022-03-07

## 2022-03-07 NOTE — TELEPHONE ENCOUNTER
Patient called and asks for a temporary handicap placard. He says he is having a lot of sciatic pain.

## 2022-03-08 ENCOUNTER — TELEPHONE (OUTPATIENT)
Dept: INTERNAL MEDICINE CLINIC | Facility: CLINIC | Age: 82
End: 2022-03-08

## 2022-03-08 NOTE — TELEPHONE ENCOUNTER
Spoke with patient. Made ov for tomorrow. Will reschedule MRI after visit. Patient verbalized understanding.

## 2022-03-08 NOTE — TELEPHONE ENCOUNTER
Patient called and says [facility] just called to tell him that his MRI which is scheduled today at 12:30 will not be covered by insurance. He asks if there is something that can be done. He does not want to cancel appointment but does not want to pay out of pocket. Is there any way nurse can reach out to insurance to have it approved?

## 2022-03-09 ENCOUNTER — OFFICE VISIT (OUTPATIENT)
Dept: INTERNAL MEDICINE CLINIC | Facility: CLINIC | Age: 82
End: 2022-03-09
Payer: MEDICARE

## 2022-03-09 VITALS
WEIGHT: 147 LBS | BODY MASS INDEX: 23 KG/M2 | HEART RATE: 74 BPM | TEMPERATURE: 98 F | DIASTOLIC BLOOD PRESSURE: 64 MMHG | SYSTOLIC BLOOD PRESSURE: 142 MMHG | OXYGEN SATURATION: 94 %

## 2022-03-09 DIAGNOSIS — M54.16 RIGHT LUMBAR RADICULOPATHY: Primary | ICD-10-CM

## 2022-03-09 PROCEDURE — 3077F SYST BP >= 140 MM HG: CPT | Performed by: INTERNAL MEDICINE

## 2022-03-09 PROCEDURE — 3078F DIAST BP <80 MM HG: CPT | Performed by: INTERNAL MEDICINE

## 2022-03-09 PROCEDURE — 99214 OFFICE O/P EST MOD 30 MIN: CPT | Performed by: INTERNAL MEDICINE

## 2022-03-09 RX ORDER — OMEGA-3S/DHA/EPA/FISH OIL/D3 1150-1000
1000 LIQUID (ML) ORAL 2 TIMES DAILY
COMMUNITY
End: 2022-03-23

## 2022-03-10 ENCOUNTER — TELEPHONE (OUTPATIENT)
Dept: INTERNAL MEDICINE CLINIC | Facility: CLINIC | Age: 82
End: 2022-03-10

## 2022-03-14 ENCOUNTER — HOSPITAL ENCOUNTER (OUTPATIENT)
Dept: MRI IMAGING | Facility: HOSPITAL | Age: 82
Discharge: HOME | End: 2022-03-14
Attending: INTERNAL MEDICINE
Payer: MEDICARE

## 2022-03-14 ENCOUNTER — TELEPHONE (OUTPATIENT)
Dept: INTERNAL MEDICINE CLINIC | Facility: CLINIC | Age: 82
End: 2022-03-14

## 2022-03-14 DIAGNOSIS — M54.40 CHRONIC LOW BACK PAIN WITH SCIATICA, SCIATICA LATERALITY UNSPECIFIED, UNSPECIFIED BACK PAIN LATERALITY: ICD-10-CM

## 2022-03-14 DIAGNOSIS — G89.29 CHRONIC LOW BACK PAIN WITH SCIATICA, SCIATICA LATERALITY UNSPECIFIED, UNSPECIFIED BACK PAIN LATERALITY: ICD-10-CM

## 2022-03-14 PROCEDURE — 72148 MRI LUMBAR SPINE W/O DYE: CPT | Performed by: INTERNAL MEDICINE

## 2022-03-14 RX ORDER — METHYLPREDNISOLONE 4 MG/1
TABLET ORAL
Qty: 1 EACH | Refills: 0 | Status: SHIPPED | OUTPATIENT
Start: 2022-03-14 | End: 2022-03-23 | Stop reason: ALTCHOICE

## 2022-03-23 ENCOUNTER — OFFICE VISIT (OUTPATIENT)
Dept: INTERNAL MEDICINE CLINIC | Facility: CLINIC | Age: 82
End: 2022-03-23
Payer: MEDICARE

## 2022-03-23 VITALS
DIASTOLIC BLOOD PRESSURE: 70 MMHG | SYSTOLIC BLOOD PRESSURE: 148 MMHG | BODY MASS INDEX: 23 KG/M2 | WEIGHT: 150 LBS | HEART RATE: 86 BPM | TEMPERATURE: 99 F | RESPIRATION RATE: 16 BRPM | OXYGEN SATURATION: 96 %

## 2022-03-23 DIAGNOSIS — M54.16 RIGHT LUMBAR RADICULOPATHY: Primary | ICD-10-CM

## 2022-03-23 PROCEDURE — 3077F SYST BP >= 140 MM HG: CPT | Performed by: INTERNAL MEDICINE

## 2022-03-23 PROCEDURE — 99213 OFFICE O/P EST LOW 20 MIN: CPT | Performed by: INTERNAL MEDICINE

## 2022-03-23 PROCEDURE — 3078F DIAST BP <80 MM HG: CPT | Performed by: INTERNAL MEDICINE

## 2022-03-23 RX ORDER — LIDOCAINE 50 MG/G
PATCH TOPICAL EVERY 24 HOURS
COMMUNITY
End: 2022-03-23

## 2022-03-23 RX ORDER — AMLODIPINE BESYLATE 5 MG/1
5 TABLET ORAL 2 TIMES DAILY
Qty: 90 TABLET | Refills: 3 | Status: SHIPPED | OUTPATIENT
Start: 2022-03-23 | End: 2023-03-18

## 2022-03-23 RX ORDER — CHLORAL HYDRATE 500 MG
1000 CAPSULE ORAL 2 TIMES DAILY
COMMUNITY

## 2022-03-23 RX ORDER — ACETAMINOPHEN 500 MG
500 TABLET ORAL 3 TIMES DAILY
COMMUNITY

## 2022-03-23 RX ORDER — LIDOCAINE 4 G/G
1 PATCH TOPICAL EVERY 24 HOURS
Qty: 30 PATCH | Refills: 0 | Status: SHIPPED | OUTPATIENT
Start: 2022-03-23 | End: 2022-04-01

## 2022-04-01 ENCOUNTER — TELEPHONE (OUTPATIENT)
Dept: INTERNAL MEDICINE CLINIC | Facility: CLINIC | Age: 82
End: 2022-04-01

## 2022-04-01 RX ORDER — LIDOCAINE 50 MG/G
1 PATCH TOPICAL EVERY 24 HOURS
Qty: 30 EACH | Refills: 1 | Status: SHIPPED | OUTPATIENT
Start: 2022-04-01 | End: 2022-04-06

## 2022-04-01 NOTE — TELEPHONE ENCOUNTER
Patient went to  a prescription for lidocaine (HM LIDOCAINE PATCH) 4 % External Patch . He had told the doctor he would prefer to have the 5% patch. Please FAX over a prescription for the 5% patch to [facility] in [place]. [phone].

## 2022-04-01 NOTE — TELEPHONE ENCOUNTER
Patient would prefer to have the 5% lidocaine patch and would like it faxed over to the [facility] in [place].

## 2022-04-06 ENCOUNTER — TELEPHONE (OUTPATIENT)
Dept: PAIN CLINIC | Facility: CLINIC | Age: 82
End: 2022-04-06

## 2022-04-06 ENCOUNTER — OFFICE VISIT (OUTPATIENT)
Dept: PAIN CLINIC | Facility: CLINIC | Age: 82
End: 2022-04-06
Payer: MEDICARE

## 2022-04-06 VITALS
OXYGEN SATURATION: 98 % | BODY MASS INDEX: 23 KG/M2 | SYSTOLIC BLOOD PRESSURE: 130 MMHG | HEART RATE: 75 BPM | WEIGHT: 150 LBS | DIASTOLIC BLOOD PRESSURE: 70 MMHG

## 2022-04-06 DIAGNOSIS — M54.16 LUMBAR RADICULOPATHY: Primary | ICD-10-CM

## 2022-04-06 PROCEDURE — 99204 OFFICE O/P NEW MOD 45 MIN: CPT | Performed by: ANESTHESIOLOGY

## 2022-04-06 NOTE — TELEPHONE ENCOUNTER
Question Answer   Anesthesia Type Local   Provider [facility]   Procedure Transforaminal   Laterality/Level right L4, L5   Medical clearance requested (will send to Pain Navigator) No   Patient has Medicare coverage?  Yes   Comments (Please list entire procedure name here.) right lumbar 4-5, lumbar 5-sacral 1 transforaminal epidural steroid injection

## 2022-04-12 ENCOUNTER — APPOINTMENT (OUTPATIENT)
Dept: GENERAL RADIOLOGY | Facility: HOSPITAL | Age: 82
End: 2022-04-12
Attending: ANESTHESIOLOGY
Payer: MEDICARE

## 2022-04-12 ENCOUNTER — HOSPITAL ENCOUNTER (OUTPATIENT)
Facility: HOSPITAL | Age: 82
Setting detail: HOSPITAL OUTPATIENT SURGERY
Discharge: HOME | End: 2022-04-12
Attending: ANESTHESIOLOGY | Admitting: ANESTHESIOLOGY
Payer: MEDICARE

## 2022-04-12 VITALS
HEART RATE: 66 BPM | RESPIRATION RATE: 16 BRPM | TEMPERATURE: 98 F | DIASTOLIC BLOOD PRESSURE: 76 MMHG | SYSTOLIC BLOOD PRESSURE: 150 MMHG | OXYGEN SATURATION: 100 %

## 2022-04-12 DIAGNOSIS — M54.16 LUMBAR RADICULOPATHY: ICD-10-CM

## 2022-04-12 PROCEDURE — 3E0R33Z INTRODUCTION OF ANTI-INFLAMMATORY INTO SPINAL CANAL, PERCUTANEOUS APPROACH: ICD-10-PCS | Performed by: ANESTHESIOLOGY

## 2022-04-12 RX ORDER — DIPHENHYDRAMINE HYDROCHLORIDE 50 MG/ML
50 INJECTION INTRAMUSCULAR; INTRAVENOUS ONCE AS NEEDED
Status: DISCONTINUED | OUTPATIENT
Start: 2022-04-12 | End: 2022-04-12

## 2022-04-12 RX ORDER — SODIUM CHLORIDE, SODIUM LACTATE, POTASSIUM CHLORIDE, CALCIUM CHLORIDE 600; 310; 30; 20 MG/100ML; MG/100ML; MG/100ML; MG/100ML
100 INJECTION, SOLUTION INTRAVENOUS CONTINUOUS
Status: DISCONTINUED | OUTPATIENT
Start: 2022-04-12 | End: 2022-04-12

## 2022-04-12 RX ORDER — LIDOCAINE HYDROCHLORIDE 10 MG/ML
INJECTION, SOLUTION EPIDURAL; INFILTRATION; INTRACAUDAL; PERINEURAL
Status: DISCONTINUED | OUTPATIENT
Start: 2022-04-12 | End: 2022-04-12

## 2022-04-12 RX ORDER — DEXAMETHASONE SODIUM PHOSPHATE 10 MG/ML
INJECTION, SOLUTION INTRAMUSCULAR; INTRAVENOUS
Status: DISCONTINUED | OUTPATIENT
Start: 2022-04-12 | End: 2022-04-12

## 2022-04-12 RX ORDER — ONDANSETRON 2 MG/ML
4 INJECTION INTRAMUSCULAR; INTRAVENOUS ONCE AS NEEDED
Status: DISCONTINUED | OUTPATIENT
Start: 2022-04-12 | End: 2022-04-12

## 2022-04-12 RX ORDER — SODIUM CHLORIDE 9 MG/ML
INJECTION INTRAVENOUS
Status: DISCONTINUED | OUTPATIENT
Start: 2022-04-12 | End: 2022-04-12

## 2022-04-12 NOTE — OPERATIVE REPORT
[facility]  Operative Report  2022     Rubén Bowers Patient Status:  Hospital Outpatient Surgery    7/10/1940 MRN [id number]   Location [address] [address] Attending Shania Nelson MD   Hosp Day # 0 PCP Juliane Matos MD     Indication: Vianey Baldwin is a 80year old male with lumbar radiculopathy    Preoperative Diagnosis:  Lumbar radiculopathy [M54.16]    Postoperative Diagnosis: Same as above. Procedure performed: RIGHT LUMBAR 4-5, LUMBAR 5-SACRAL 1 TRANSFORAMINAL EPIDURAL STEROID INJECTION MULTIPLE LEVEL WITH LOCAL     Anesthesia: Local     EBL: Less than 1 ml. Procedure Description:   After reviewing the patient's history and performing a focused physical examination, the diagnosis was confirmed and contraindications such as infection and coagulopathy were ruled out. Following review of potential side effects and complications, including but not necessarily limited to infection, allergic reaction, local tissue breakdown, nerve injury, and paresis, the patient indicated they understood and agreed to proceed. After obtaining the informed consent, the patient was brought to the procedure room and monitored. In the prone position, following sterile prep and drape of the lumbar region, the L4 neural foramen was identified under fluoroscopy. The skin and subcutaneous tissue was anesthetized via 25-gauge 1.5\" needle with approximately 2 cc of 1% lidocaine. A 22-gauge 5\" Quincke spinal needle was introduced toward the inferior aspect of the junction between the transverse process and pedicle of the L4 level atraumatically under fluoroscopic guidance. The needle was advanced into the anterior epidural space at this level. The needle position was confirmed under AP and lateral fluoroscopic view. A second needle was then placed in a similar fashion at the L5-S1 level. Following negative aspiration for CSF and blood, approximately 1 cc of Omnipaque 240 was injected.   An excellent contrast spread along the epidural space and the nerve root was obtained. At this point, 2 cc of normal saline with 10 mg of dexamethasone was injected without complication. The needle was withdrawn with stylet in situ. The patient tolerated procedure very well. The patient was observed until discharge criteria met. Discharge instructions were given and patient was released to a responsible adult. Complications: None. Follow up: The patient was followed in the pain clinic as needed basis.       Carole Arevalo MD

## 2022-04-19 ENCOUNTER — OFFICE VISIT (OUTPATIENT)
Dept: INTERNAL MEDICINE CLINIC | Facility: CLINIC | Age: 82
End: 2022-04-19
Payer: MEDICARE

## 2022-04-19 VITALS
HEART RATE: 61 BPM | TEMPERATURE: 98 F | OXYGEN SATURATION: 98 % | RESPIRATION RATE: 16 BRPM | BODY MASS INDEX: 23.54 KG/M2 | SYSTOLIC BLOOD PRESSURE: 120 MMHG | DIASTOLIC BLOOD PRESSURE: 60 MMHG | WEIGHT: 150 LBS | HEIGHT: 67 IN

## 2022-04-19 DIAGNOSIS — M54.16 RIGHT LUMBAR RADICULOPATHY: Primary | ICD-10-CM

## 2022-04-19 PROCEDURE — 99212 OFFICE O/P EST SF 10 MIN: CPT | Performed by: INTERNAL MEDICINE

## 2022-04-19 PROCEDURE — 1111F DSCHRG MED/CURRENT MED MERGE: CPT | Performed by: INTERNAL MEDICINE

## 2022-04-19 RX ORDER — ASPIRIN 81 MG/1
81 TABLET ORAL DAILY
Refills: 0 | COMMUNITY
Start: 2022-04-19

## 2022-04-22 NOTE — PROGRESS NOTES
Spoke with patient. Just saw Dr. Sergio Jaeger for injections. Seeing him again next week. Understood to hold off on physical therapy for now.

## 2022-04-27 ENCOUNTER — OFFICE VISIT (OUTPATIENT)
Dept: PAIN CLINIC | Facility: CLINIC | Age: 82
End: 2022-04-27
Payer: MEDICARE

## 2022-04-27 ENCOUNTER — TELEPHONE (OUTPATIENT)
Dept: PAIN CLINIC | Facility: CLINIC | Age: 82
End: 2022-04-27

## 2022-04-27 VITALS — OXYGEN SATURATION: 99 % | HEART RATE: 64 BPM | SYSTOLIC BLOOD PRESSURE: 120 MMHG | DIASTOLIC BLOOD PRESSURE: 62 MMHG

## 2022-04-27 DIAGNOSIS — M54.16 LUMBAR RADICULOPATHY: Primary | ICD-10-CM

## 2022-04-27 PROCEDURE — 99214 OFFICE O/P EST MOD 30 MIN: CPT | Performed by: ANESTHESIOLOGY

## 2022-04-27 NOTE — PROGRESS NOTES
Last procedure: RIGHT LUMBAR 4-5, LUMBAR 5-SACRAL 1 TRANSFORAMINAL EPIDURAL STEROID INJECTION MULTIPLE LEVEL WITH LOCAL  Date: 4/12/22  Percentage of relief obtained: 60-70%  Duration of relief: current    Current Pain Score: 2-3/10

## 2022-04-27 NOTE — TELEPHONE ENCOUNTER
Question Answer   Anesthesia Type Local   Provider [facility]   Procedure Transforaminal   Laterality/Level right L4-5, L5-S1   Medical clearance requested (will send to Pain Navigator) No   Patient has Medicare coverage?  Yes   Comments (Please list entire procedure name here.) right lumbar 4-5, lumbar 5-sacral 1 transforaminal epidural steroid injection

## 2022-04-28 ENCOUNTER — APPOINTMENT (OUTPATIENT)
Dept: GENERAL RADIOLOGY | Facility: HOSPITAL | Age: 82
End: 2022-04-28
Attending: ANESTHESIOLOGY
Payer: MEDICARE

## 2022-04-28 ENCOUNTER — HOSPITAL ENCOUNTER (OUTPATIENT)
Facility: HOSPITAL | Age: 82
Setting detail: HOSPITAL OUTPATIENT SURGERY
Discharge: HOME | End: 2022-04-28
Attending: ANESTHESIOLOGY | Admitting: ANESTHESIOLOGY
Payer: MEDICARE

## 2022-04-28 VITALS
DIASTOLIC BLOOD PRESSURE: 73 MMHG | HEART RATE: 70 BPM | RESPIRATION RATE: 18 BRPM | TEMPERATURE: 98 F | OXYGEN SATURATION: 100 % | SYSTOLIC BLOOD PRESSURE: 153 MMHG

## 2022-04-28 DIAGNOSIS — M54.16 LUMBAR RADICULOPATHY: ICD-10-CM

## 2022-04-28 PROCEDURE — 64483 NJX AA&/STRD TFRM EPI L/S 1: CPT | Performed by: ANESTHESIOLOGY

## 2022-04-28 PROCEDURE — 3E0R3KZ INTRODUCTION OF OTHER DIAGNOSTIC SUBSTANCE INTO SPINAL CANAL, PERCUTANEOUS APPROACH: ICD-10-PCS | Performed by: ANESTHESIOLOGY

## 2022-04-28 PROCEDURE — 64484 NJX AA&/STRD TFRM EPI L/S EA: CPT | Performed by: ANESTHESIOLOGY

## 2022-04-28 PROCEDURE — 3E0R33Z INTRODUCTION OF ANTI-INFLAMMATORY INTO SPINAL CANAL, PERCUTANEOUS APPROACH: ICD-10-PCS | Performed by: ANESTHESIOLOGY

## 2022-04-28 RX ORDER — DIPHENHYDRAMINE HYDROCHLORIDE 50 MG/ML
50 INJECTION INTRAMUSCULAR; INTRAVENOUS ONCE AS NEEDED
OUTPATIENT
Start: 2022-04-28 | End: 2022-04-28

## 2022-04-28 RX ORDER — DEXAMETHASONE SODIUM PHOSPHATE 10 MG/ML
INJECTION, SOLUTION INTRAMUSCULAR; INTRAVENOUS
Status: DISCONTINUED | OUTPATIENT
Start: 2022-04-28 | End: 2022-04-28

## 2022-04-28 RX ORDER — LIDOCAINE HYDROCHLORIDE 10 MG/ML
INJECTION, SOLUTION EPIDURAL; INFILTRATION; INTRACAUDAL; PERINEURAL
Status: DISCONTINUED | OUTPATIENT
Start: 2022-04-28 | End: 2022-04-28

## 2022-04-28 RX ORDER — SODIUM CHLORIDE 9 MG/ML
INJECTION INTRAVENOUS
Status: DISCONTINUED | OUTPATIENT
Start: 2022-04-28 | End: 2022-04-28

## 2022-04-28 RX ORDER — ONDANSETRON 2 MG/ML
4 INJECTION INTRAMUSCULAR; INTRAVENOUS ONCE AS NEEDED
OUTPATIENT
Start: 2022-04-28 | End: 2022-04-28

## 2022-04-28 NOTE — OPERATIVE REPORT
[facility]  Operative Report  2022     Marissa Nath Patient Status:  Hospital Outpatient Surgery    7/10/1940 MRN [id number]   Location [id number] [place] [address] Attending Yomaira Bray MD   Hosp Day # 0 PCP Hu Mai MD     Indication: Gillian Castelan is a 80year old male with lumbar radiculopathy    Preoperative Diagnosis:  Lumbar radiculopathy [M54.16]    Postoperative Diagnosis: Same as above. Procedure performed: RIGHT LUMBAR 4-5, LUMBAR 5-SACRAL 1 TRANSFORAMINAL EPIDURAL STEROID INJECTION MULTIPLE LEVEL WITH LOCAL     Anesthesia: Local     EBL: Less than 1 ml. Procedure Description:   After reviewing the patient's history and performing a focused physical examination, the diagnosis was confirmed and contraindications such as infection and coagulopathy were ruled out. Following review of potential side effects and complications, including but not necessarily limited to infection, allergic reaction, local tissue breakdown, nerve injury, and paresis, the patient indicated they understood and agreed to proceed. After obtaining the informed consent, the patient was brought to the procedure room and monitored. In the prone position, following sterile prep and drape of the lumbar region, the L4 neural foramen was identified under fluoroscopy. The skin and subcutaneous tissue was anesthetized via 25-gauge 1.5\" needle with approximately 2 cc of 1% lidocaine. A 22-gauge 5\" Quincke spinal needle was introduced toward the inferior aspect of the junction between the transverse process and pedicle of the L4 level atraumatically under fluoroscopic guidance. The needle was advanced into the anterior epidural space at this level. The needle position was confirmed under AP and lateral fluoroscopic view. A second needle was then placed in a similar fashion at the L5-S1 level. Following negative aspiration for CSF and blood, approximately 1 cc of Omnipaque 240 was injected.   An excellent contrast spread along the epidural space and the nerve root was obtained. At this point, 2 cc of normal saline with 10 mg of dexamethasone was injected without complication. The needle was withdrawn with stylet in situ. The patient tolerated procedure very well. The patient was observed until discharge criteria met. Discharge instructions were given and patient was released to a responsible adult. Complications: None. Follow up: The patient was followed in the pain clinic as needed basis.       Paty Hugo MD

## 2022-05-16 ENCOUNTER — OFFICE VISIT (OUTPATIENT)
Dept: PAIN CLINIC | Facility: CLINIC | Age: 82
End: 2022-05-16
Payer: MEDICARE

## 2022-05-16 VITALS
OXYGEN SATURATION: 62 % | WEIGHT: 150 LBS | HEART RATE: 97 BPM | SYSTOLIC BLOOD PRESSURE: 124 MMHG | BODY MASS INDEX: 23 KG/M2 | DIASTOLIC BLOOD PRESSURE: 60 MMHG

## 2022-05-16 DIAGNOSIS — M54.16 LUMBAR RADICULOPATHY: Primary | ICD-10-CM

## 2022-05-16 PROCEDURE — 99214 OFFICE O/P EST MOD 30 MIN: CPT | Performed by: ANESTHESIOLOGY

## 2022-05-16 NOTE — PROGRESS NOTES
Last procedure: RIGHT LUMBAR 4-5, LUMBAR 5-SACRAL 1 TRANSFORAMINAL EPIDURAL STEROID INJECTION MULTIPLE LEVEL WITH LOCAL  Date: 04/28/22  Percentage of relief obtained: 80-90%  Duration of relief: current    Current Pain Score: 1

## 2022-06-14 ENCOUNTER — OFFICE VISIT (OUTPATIENT)
Dept: INTERNAL MEDICINE CLINIC | Facility: CLINIC | Age: 82
End: 2022-06-14
Payer: MEDICARE

## 2022-06-14 VITALS
OXYGEN SATURATION: 97 % | DIASTOLIC BLOOD PRESSURE: 60 MMHG | WEIGHT: 154 LBS | TEMPERATURE: 97 F | HEIGHT: 67 IN | HEART RATE: 66 BPM | RESPIRATION RATE: 16 BRPM | SYSTOLIC BLOOD PRESSURE: 124 MMHG | BODY MASS INDEX: 24.17 KG/M2

## 2022-06-14 DIAGNOSIS — I25.10 CORONARY ARTERY DISEASE DUE TO LIPID RICH PLAQUE: Primary | ICD-10-CM

## 2022-06-14 DIAGNOSIS — I25.83 CORONARY ARTERY DISEASE DUE TO LIPID RICH PLAQUE: Primary | ICD-10-CM

## 2022-06-14 PROCEDURE — 99213 OFFICE O/P EST LOW 20 MIN: CPT | Performed by: INTERNAL MEDICINE

## 2022-08-16 ENCOUNTER — OFFICE VISIT (OUTPATIENT)
Facility: LOCATION | Age: 82
End: 2022-08-16
Payer: MEDICARE

## 2022-08-16 DIAGNOSIS — H61.23 CERUMEN DEBRIS ON TYMPANIC MEMBRANE OF BOTH EARS: Primary | ICD-10-CM

## 2022-08-16 PROCEDURE — 99212 OFFICE O/P EST SF 10 MIN: CPT | Performed by: OTOLARYNGOLOGY

## 2022-08-16 PROCEDURE — 92504 EAR MICROSCOPY EXAMINATION: CPT | Performed by: OTOLARYNGOLOGY

## 2022-08-16 NOTE — PROGRESS NOTES
Marissa Nath is a 80year old male. Patient presents with:  Ear Wax    HPI:   She complains of fullness in his ears. He has no pain. He has no discharge. REVIEW OF SYSTEMS:   GENERAL HEALTH: feels well otherwise  GENERAL : denies fever, chills, sweats, weight loss, weight gain  SKIN: denies any unusual skin lesions or rashes  RESPIRATORY: denies shortness of breath with exertion  NEURO: denies headaches    EXAM:   There were no vitals taken for this visit. System Findings Details   Constitutional Normal Overall appearance - Normal.   Psychiatric Normal Orientation - Oriented to time, place, person & situation. Appropriate mood and affect. Head/Face Normal Facial features -- Normal. Skull - Normal.   Eyes Normal Pupils equal ,round ,react to light and accomidate   Ears, Nose, Throat, Neck  abnormal  ears show wax impactions bilaterally removed today under the microscope. Oropharynx clear neck no masses   Neurological Normal Memory - Normal. Cranial nerves - Cranial nerves II through XII grossly intact. Lymph Detail Normal Submental. Submandibular. Anterior cervical. Posterior cervical. Supraclavicular. ASSESSMENT AND PLAN:   1. Cerumen debris on tympanic membrane of both ears  Removed today. We will see us back as needed. The patient indicates understanding of these issues and agrees to the plan. No follow-ups on file.     Nhan Ambriz MD  8/16/2022  3:14 PM

## 2022-08-29 LAB
AMB EXT CHLORIDE: 100
AMB EXT CHOL/HDL RATIO: 2.1
AMB EXT CHOLESTEROL, TOTAL: 129 MG/DL
AMB EXT CMP ALT: 27 U/L
AMB EXT CMP AST: 30 U/L
AMB EXT GLUCOSE: 89 MG/DL
AMB EXT HDL CHOLESTEROL: 62 MG/DL
AMB EXT HEMATOCRIT: 43.8
AMB EXT HEMOGLOBIN: 14.5
AMB EXT HGBA1C: 5.3 %
AMB EXT LDL CHOLESTEROL, DIRECT: 55 MG/DL
AMB EXT MCV: 96.5
AMB EXT NON HDL CHOL: 67 MG/DL
AMB EXT POSTASSIUM: 4.3 MMOL/L
AMB EXT PSA SCREEN: 2.45 NG/ML
AMB EXT SODIUM: 139 MMOL/L
AMB EXT TRIGLYCERIDES: 42 MG/DL
AMB EXT TSH: 3.05 MIU/ML
AMB EXT WBC: 5 X10(3)UL

## 2022-08-30 ENCOUNTER — NURSE ONLY (OUTPATIENT)
Dept: INTERNAL MEDICINE CLINIC | Facility: CLINIC | Age: 82
End: 2022-08-30
Payer: MEDICARE

## 2022-08-30 DIAGNOSIS — Z00.00 LABORATORY EXAMINATION ORDERED AS PART OF A ROUTINE GENERAL MEDICAL EXAMINATION: Primary | ICD-10-CM

## 2022-08-30 LAB
BILIRUB UR QL STRIP.AUTO: NEGATIVE
CLARITY UR REFRACT.AUTO: CLEAR
GLUCOSE UR STRIP.AUTO-MCNC: NEGATIVE MG/DL
KETONES UR STRIP.AUTO-MCNC: NEGATIVE MG/DL
LEUKOCYTE ESTERASE UR QL STRIP.AUTO: NEGATIVE
NITRITE UR QL STRIP.AUTO: NEGATIVE
PH UR STRIP.AUTO: 7 [PH] (ref 5–8)
PROT UR STRIP.AUTO-MCNC: NEGATIVE MG/DL
RBC UR QL AUTO: NEGATIVE
SP GR UR STRIP.AUTO: 1.01 (ref 1–1.03)
UROBILINOGEN UR STRIP.AUTO-MCNC: <2 MG/DL

## 2022-08-30 PROCEDURE — 99173 VISUAL ACUITY SCREEN: CPT | Performed by: INTERNAL MEDICINE

## 2022-08-30 PROCEDURE — 93923 UPR/LXTR ART STDY 3+ LVLS: CPT | Performed by: INTERNAL MEDICINE

## 2022-08-30 PROCEDURE — 92551 PURE TONE HEARING TEST AIR: CPT | Performed by: INTERNAL MEDICINE

## 2022-08-30 PROCEDURE — 81003 URINALYSIS AUTO W/O SCOPE: CPT | Performed by: INTERNAL MEDICINE

## 2022-09-06 ENCOUNTER — HOSPITAL ENCOUNTER (OUTPATIENT)
Dept: CV DIAGNOSTICS | Facility: HOSPITAL | Age: 82
Discharge: HOME | End: 2022-09-06
Attending: INTERNAL MEDICINE
Payer: MEDICARE

## 2022-09-06 DIAGNOSIS — E78.00 PURE HYPERCHOLESTEROLEMIA: ICD-10-CM

## 2022-09-06 DIAGNOSIS — I10 ESSENTIAL HYPERTENSION: ICD-10-CM

## 2022-09-06 DIAGNOSIS — I25.10 CORONARY ARTERY DISEASE INVOLVING NATIVE CORONARY ARTERY OF NATIVE HEART WITHOUT ANGINA PECTORIS: ICD-10-CM

## 2022-09-06 PROCEDURE — 93017 CV STRESS TEST TRACING ONLY: CPT | Performed by: INTERNAL MEDICINE

## 2022-09-06 PROCEDURE — 78452 HT MUSCLE IMAGE SPECT MULT: CPT | Performed by: INTERNAL MEDICINE

## 2022-09-06 PROCEDURE — 93018 CV STRESS TEST I&R ONLY: CPT | Performed by: INTERNAL MEDICINE

## 2022-09-14 ENCOUNTER — OFFICE VISIT (OUTPATIENT)
Dept: INTERNAL MEDICINE CLINIC | Facility: CLINIC | Age: 82
End: 2022-09-14
Payer: MEDICARE

## 2022-09-14 ENCOUNTER — PATIENT MESSAGE (OUTPATIENT)
Dept: INTERNAL MEDICINE CLINIC | Facility: CLINIC | Age: 82
End: 2022-09-14

## 2022-09-14 VITALS
DIASTOLIC BLOOD PRESSURE: 80 MMHG | OXYGEN SATURATION: 98 % | SYSTOLIC BLOOD PRESSURE: 120 MMHG | RESPIRATION RATE: 16 BRPM | HEART RATE: 60 BPM | WEIGHT: 150.13 LBS | TEMPERATURE: 98 F | BODY MASS INDEX: 23.56 KG/M2 | HEIGHT: 67 IN

## 2022-09-14 DIAGNOSIS — Z00.00 ANNUAL PHYSICAL EXAM: Primary | ICD-10-CM

## 2022-09-14 RX ORDER — METOPROLOL SUCCINATE 25 MG/1
25 TABLET, EXTENDED RELEASE ORAL DAILY
Qty: 90 TABLET | Refills: 3 | Status: SHIPPED | OUTPATIENT
Start: 2022-09-14

## 2022-09-14 RX ORDER — ERGOCALCIFEROL 1.25 MG/1
50000 CAPSULE ORAL
Qty: 6 CAPSULE | Refills: 3 | Status: SHIPPED | OUTPATIENT
Start: 2022-09-14 | End: 2022-10-14

## 2022-09-14 RX ORDER — ATORVASTATIN CALCIUM 40 MG/1
40 TABLET, FILM COATED ORAL NIGHTLY
Qty: 90 TABLET | Refills: 3 | Status: SHIPPED | OUTPATIENT
Start: 2022-09-14

## 2022-09-14 RX ORDER — AMLODIPINE BESYLATE 5 MG/1
5 TABLET ORAL 2 TIMES DAILY
Qty: 90 TABLET | Refills: 3 | Status: SHIPPED | OUTPATIENT
Start: 2022-09-14 | End: 2023-09-09

## 2022-09-14 RX ORDER — FLUTICASONE PROPIONATE 50 MCG
2 SPRAY, SUSPENSION (ML) NASAL DAILY
Qty: 16 G | Refills: 3 | Status: SHIPPED | OUTPATIENT
Start: 2022-09-14 | End: 2023-09-09

## 2022-09-14 RX ORDER — POTASSIUM CHLORIDE 750 MG/1
15 TABLET, FILM COATED, EXTENDED RELEASE ORAL DAILY
Qty: 45 TABLET | Refills: 3 | Status: SHIPPED | OUTPATIENT
Start: 2022-09-14

## 2022-09-14 RX ORDER — TAMSULOSIN HYDROCHLORIDE 0.4 MG/1
0.4 CAPSULE ORAL DAILY
Qty: 90 CAPSULE | Refills: 3 | Status: SHIPPED | OUTPATIENT
Start: 2022-09-14 | End: 2022-10-14

## 2022-09-14 NOTE — TELEPHONE ENCOUNTER
From: Bindu Lopes  To: Magaly Magallanes MD  Sent: 9/14/2022 1:39 PM CDT  Subject: Pfizer-Bivalent vaccine    Dr Naima Hazel, The morning of 9/14/22 after my meeting with Dr Abel Hernandez I had the Pfizer-Bivalent vaccine shot at the [facility] in [place].   Chrissy Diego  [birth year]

## 2022-10-07 ENCOUNTER — PATIENT MESSAGE (OUTPATIENT)
Dept: INTERNAL MEDICINE CLINIC | Facility: CLINIC | Age: 82
End: 2022-10-07

## 2022-10-07 DIAGNOSIS — G89.29 CHRONIC LOW BACK PAIN, UNSPECIFIED BACK PAIN LATERALITY, UNSPECIFIED WHETHER SCIATICA PRESENT: ICD-10-CM

## 2022-10-07 DIAGNOSIS — M54.50 CHRONIC LOW BACK PAIN, UNSPECIFIED BACK PAIN LATERALITY, UNSPECIFIED WHETHER SCIATICA PRESENT: ICD-10-CM

## 2022-10-07 DIAGNOSIS — M54.30 SCIATICA, UNSPECIFIED LATERALITY: Primary | ICD-10-CM

## 2022-10-07 RX ORDER — OMEPRAZOLE 40 MG/1
40 CAPSULE, DELAYED RELEASE ORAL DAILY
Qty: 90 CAPSULE | Refills: 0 | Status: SHIPPED | OUTPATIENT
Start: 2022-10-07 | End: 2023-10-02

## 2022-10-07 NOTE — TELEPHONE ENCOUNTER
Omeprazole 40 mg 1 cap daily filled 10/11/21 90 with 3 refills     LOV 9/14/22  Return if symptoms worsen or fail to improve.   No upcoming apt on file   Labs 8/29/22

## 2022-10-07 NOTE — TELEPHONE ENCOUNTER
Ebert message sent   Pt ok'd to see Dr Latonya Leonardo per Dr Akil Parikh  Referral placed    [facility] RN

## 2022-10-07 NOTE — TELEPHONE ENCOUNTER
From: Pedrito Gama  To: Kg Barbosa MD  Sent: 10/7/2022 10:25 AM CDT  Subject: Sciatic & Lower Lumbar    Dr Silvia Gordon, The issue has returned! Now pain rather walking, standing, sitting. Been taking 500mg Tylenol   every 8 hours. With your ok I plan to call Dr Rangel Collazo. Been  about 6 months since the steroid shots. I have BC & BS Plan F Secure & Medicare A&B. I assume my plan covers  more shots? Let me know of okay to make an appt. with Dr Rangel Collazo?   Thanks,  Graciela Pals  [birth year]

## 2022-10-12 ENCOUNTER — TELEPHONE (OUTPATIENT)
Dept: NEUROLOGY | Facility: CLINIC | Age: 82
End: 2022-10-12

## 2022-10-12 ENCOUNTER — OFFICE VISIT (OUTPATIENT)
Dept: PAIN CLINIC | Facility: CLINIC | Age: 82
End: 2022-10-12
Payer: MEDICARE

## 2022-10-12 VITALS — OXYGEN SATURATION: 98 % | HEART RATE: 65 BPM | DIASTOLIC BLOOD PRESSURE: 70 MMHG | SYSTOLIC BLOOD PRESSURE: 124 MMHG

## 2022-10-12 DIAGNOSIS — M54.16 LUMBAR RADICULOPATHY: Primary | ICD-10-CM

## 2022-10-12 PROCEDURE — 99214 OFFICE O/P EST MOD 30 MIN: CPT | Performed by: ANESTHESIOLOGY

## 2022-10-12 RX ORDER — METHYLPREDNISOLONE 4 MG/1
TABLET ORAL
Qty: 1 EACH | Refills: 0 | Status: SHIPPED | OUTPATIENT
Start: 2022-10-12

## 2022-10-12 NOTE — TELEPHONE ENCOUNTER
Question Answer   Anesthesia Type Local   Provider Jennifer Oconnell   Location Lab   Procedure Transforaminal   Laterality/Level right L4, L5   Medical clearance requested (will send to Pain Navigator) No   Patient has Medicare coverage?  Yes   Comments (Please list entire procedure name here.) right lumbar 4-5, lumbar 5-sacral 1 transforaminal epidural steroid injection

## 2022-10-12 NOTE — PROGRESS NOTES
Patient presents in office today with reported pain in right lower back radiating down leg    Pt is taking 800mg tylenol every 8 hours and using leg brace    Current pain level reported = 4-5/10, 8/10 in mornings    Last reported dose of n/a      Narcotic Contract renewal n/a    Urine Drug screen n/a

## 2022-10-18 ENCOUNTER — APPOINTMENT (OUTPATIENT)
Dept: GENERAL RADIOLOGY | Facility: HOSPITAL | Age: 82
End: 2022-10-18
Attending: ANESTHESIOLOGY
Payer: MEDICARE

## 2022-10-18 ENCOUNTER — HOSPITAL ENCOUNTER (OUTPATIENT)
Facility: HOSPITAL | Age: 82
Setting detail: HOSPITAL OUTPATIENT SURGERY
Discharge: HOME | End: 2022-10-18
Attending: ANESTHESIOLOGY | Admitting: ANESTHESIOLOGY
Payer: MEDICARE

## 2022-10-18 VITALS
RESPIRATION RATE: 18 BRPM | BODY MASS INDEX: 23.54 KG/M2 | SYSTOLIC BLOOD PRESSURE: 162 MMHG | HEART RATE: 67 BPM | OXYGEN SATURATION: 93 % | TEMPERATURE: 97 F | DIASTOLIC BLOOD PRESSURE: 67 MMHG | WEIGHT: 150 LBS | HEIGHT: 67 IN

## 2022-10-18 DIAGNOSIS — M54.16 LUMBAR RADICULOPATHY: ICD-10-CM

## 2022-10-18 PROCEDURE — 3E0R33Z INTRODUCTION OF ANTI-INFLAMMATORY INTO SPINAL CANAL, PERCUTANEOUS APPROACH: ICD-10-PCS | Performed by: ANESTHESIOLOGY

## 2022-10-18 PROCEDURE — 3E0S33Z INTRODUCTION OF ANTI-INFLAMMATORY INTO EPIDURAL SPACE, PERCUTANEOUS APPROACH: ICD-10-PCS | Performed by: ANESTHESIOLOGY

## 2022-10-18 RX ORDER — DEXAMETHASONE SODIUM PHOSPHATE 10 MG/ML
INJECTION, SOLUTION INTRAMUSCULAR; INTRAVENOUS
Status: DISCONTINUED | OUTPATIENT
Start: 2022-10-18 | End: 2022-10-18

## 2022-10-18 RX ORDER — LIDOCAINE HYDROCHLORIDE 10 MG/ML
INJECTION, SOLUTION EPIDURAL; INFILTRATION; INTRACAUDAL; PERINEURAL
Status: DISCONTINUED | OUTPATIENT
Start: 2022-10-18 | End: 2022-10-18

## 2022-10-18 RX ORDER — SODIUM CHLORIDE 9 MG/ML
INJECTION INTRAVENOUS
Status: DISCONTINUED | OUTPATIENT
Start: 2022-10-18 | End: 2022-10-18

## 2022-10-18 NOTE — OPERATIVE REPORT
[facility]  Operative Report  10/18/2022     Patricia Betancourt Patient Status:  Hospital Outpatient Surgery    7/10/1940 MRN [id number]   Location [address] [address] Attending Marisa Jerome MD   Hosp Day # 0 PCP Scout Asencio MD     Indication: Digna Christie is a 80year old male with lumbar radiculopathy    Imaging: Lumbar MRI reviewed    Preoperative Diagnosis:  Lumbar radiculopathy [M54.16]    Postoperative Diagnosis: Same as above. Procedure performed: RIGHT LUMBAR 4-5, LUMBAR 5-SACRAL 1 TRANSFORAMINAL EPIDURAL STEROID INJECTION MULTIPLE LEVEL with local      Anesthesia: Local .    EBL: Less than 1 ml. Procedure Description:  After reviewing the patient's history and performing a focused physical examination, the diagnosis was confirmed and contraindications such as infection and coagulopathy were ruled out. Following review of potential side effects and complications, including but not necessarily limited to infection, allergic reaction, local tissue breakdown, nerve injury, and paresis, the patient indicated they understood and agreed to proceed. After obtaining the informed consent, the patient was brought to the procedure room and monitored. In the prone position, following sterile prep and drape of the lumbar region,  the  L4 neural foramen was identified under fluoroscopy. The skin and subcutaneous tissue was anesthetized via 25-gauge 1.5\" needle with approximately 2 cc of 1% lidocaine. A 22-gauge 5\" Quincke spinal needle was introduced toward the inferior aspect of the junction between the transverse process and pedicle of the  L4 level atraumatically under fluoroscopic guidance. The needle was advanced into the anterior epidural space at this level. The needle position was confirmed under AP and lateral fluoroscopic view. Following negative aspiration for CSF and blood, approximately 1 cc of Omnipaque 240 was injected.   An excellent contrast spread along the epidural space and the nerve root was obtained. At this point, 1cc of NS with 5 mg of dexamethasone was injected without complication. The needle was withdrawn with stylet in situ after being flushed with 1 cc PF lidocaine. The  L5 neural foramen was also identified under fluoroscopy. The skin and subcutaneous tissue was anesthetized via 25-gauge 1.5\" needle with approximately 2 cc of 1% lidocaine. A 22-gauge 5\" Quincke spinal needle was introduced toward the inferior aspect of the junction between the transverse process and pedicle of the L5 level atraumatically under fluoroscopic guidance. The needle was advanced into the anterior epidural space at this level. The needle position was confirmed under AP and lateral fluoroscopic view. Following negative aspiration for CSF and blood, approximately 1 cc of Omnipaque 240 was injected. An excellent contrast spread along the epidural space and the nerve root was obtained. At this point, 1cc of NS with 5 mg of dexamethasone was injected without complication. The needle was withdrawn with stylet in situ after being flushed with 1 cc PF lidocaine. .  The patient tolerated procedure very well. The patient was observed until discharge criteria met. Discharge instructions were given and patient was released to a responsible adult. Complications: None. Follow up: The patient was followed in the pain clinic as needed basis.         Deandre Zavala MD

## 2022-10-19 RX ORDER — SELENIUM 100 MCG
100 TABLET ORAL DAILY
Qty: 60 TABLET | Refills: 0 | COMMUNITY
Start: 2022-10-19

## 2022-10-19 RX ORDER — POTASSIUM CHLORIDE 750 MG/1
10 TABLET, FILM COATED, EXTENDED RELEASE ORAL EVERY OTHER DAY
Qty: 45 TABLET | Refills: 0 | COMMUNITY
Start: 2022-10-19

## 2022-10-26 ENCOUNTER — OFFICE VISIT (OUTPATIENT)
Dept: PAIN CLINIC | Facility: CLINIC | Age: 82
End: 2022-10-26
Payer: MEDICARE

## 2022-10-26 ENCOUNTER — TELEPHONE (OUTPATIENT)
Dept: NEUROLOGY | Facility: CLINIC | Age: 82
End: 2022-10-26

## 2022-10-26 VITALS
DIASTOLIC BLOOD PRESSURE: 60 MMHG | WEIGHT: 150 LBS | HEART RATE: 72 BPM | SYSTOLIC BLOOD PRESSURE: 128 MMHG | BODY MASS INDEX: 23 KG/M2 | OXYGEN SATURATION: 98 %

## 2022-10-26 DIAGNOSIS — M54.16 LUMBAR RADICULOPATHY: Primary | ICD-10-CM

## 2022-10-26 PROCEDURE — 99214 OFFICE O/P EST MOD 30 MIN: CPT | Performed by: ANESTHESIOLOGY

## 2022-10-26 NOTE — PROGRESS NOTES
Last procedure: RIGHT LUMBAR 4-5, LUMBAR 5-SACRAL 1 TRANSFORAMINAL EPIDURAL STEROID INJECTION MULTIPLE LEVEL with local  Date: 10/18/22  Percentage of relief obtained: 0  Duration of relief: na    Current Pain Score: 4

## 2022-10-26 NOTE — TELEPHONE ENCOUNTER
Question Answer   Anesthesia Type Local   Provider Heather Benítez   [facility]   Procedure Transforaminal   Laterality/Level right L4, L5   Medical clearance requested (will send to Pain Navigator) No   Patient has Medicare coverage?  Yes   Comments (Please list entire procedure name here.) right lumbar 4-5, lumbar 5-sacral 1 transforaminal epidural steroid injection

## 2022-11-01 ENCOUNTER — APPOINTMENT (OUTPATIENT)
Dept: GENERAL RADIOLOGY | Facility: HOSPITAL | Age: 82
End: 2022-11-01
Attending: ANESTHESIOLOGY
Payer: MEDICARE

## 2022-11-01 ENCOUNTER — HOSPITAL ENCOUNTER (OUTPATIENT)
Facility: HOSPITAL | Age: 82
Setting detail: HOSPITAL OUTPATIENT SURGERY
Discharge: HOME | End: 2022-11-01
Attending: ANESTHESIOLOGY | Admitting: ANESTHESIOLOGY
Payer: MEDICARE

## 2022-11-01 VITALS
RESPIRATION RATE: 18 BRPM | WEIGHT: 150 LBS | DIASTOLIC BLOOD PRESSURE: 69 MMHG | OXYGEN SATURATION: 99 % | HEIGHT: 67 IN | SYSTOLIC BLOOD PRESSURE: 145 MMHG | TEMPERATURE: 98 F | BODY MASS INDEX: 23.54 KG/M2 | HEART RATE: 72 BPM

## 2022-11-01 PROCEDURE — 3E0R3KZ INTRODUCTION OF OTHER DIAGNOSTIC SUBSTANCE INTO SPINAL CANAL, PERCUTANEOUS APPROACH: ICD-10-PCS | Performed by: ANESTHESIOLOGY

## 2022-11-01 PROCEDURE — 64483 NJX AA&/STRD TFRM EPI L/S 1: CPT | Performed by: ANESTHESIOLOGY

## 2022-11-01 PROCEDURE — 3E0R33Z INTRODUCTION OF ANTI-INFLAMMATORY INTO SPINAL CANAL, PERCUTANEOUS APPROACH: ICD-10-PCS | Performed by: ANESTHESIOLOGY

## 2022-11-01 PROCEDURE — 64484 NJX AA&/STRD TFRM EPI L/S EA: CPT | Performed by: ANESTHESIOLOGY

## 2022-11-01 RX ORDER — SODIUM CHLORIDE, SODIUM LACTATE, POTASSIUM CHLORIDE, CALCIUM CHLORIDE 600; 310; 30; 20 MG/100ML; MG/100ML; MG/100ML; MG/100ML
100 INJECTION, SOLUTION INTRAVENOUS CONTINUOUS
Status: DISCONTINUED | OUTPATIENT
Start: 2022-11-01 | End: 2022-11-01

## 2022-11-01 RX ORDER — DEXAMETHASONE SODIUM PHOSPHATE 10 MG/ML
INJECTION, SOLUTION INTRAMUSCULAR; INTRAVENOUS
Status: DISCONTINUED | OUTPATIENT
Start: 2022-11-01 | End: 2022-11-01

## 2022-11-01 RX ORDER — LIDOCAINE HYDROCHLORIDE 10 MG/ML
INJECTION, SOLUTION EPIDURAL; INFILTRATION; INTRACAUDAL; PERINEURAL
Status: DISCONTINUED | OUTPATIENT
Start: 2022-11-01 | End: 2022-11-01

## 2022-11-01 RX ORDER — SODIUM CHLORIDE 9 MG/ML
INJECTION INTRAVENOUS
Status: DISCONTINUED | OUTPATIENT
Start: 2022-11-01 | End: 2022-11-01

## 2022-11-01 RX ORDER — ONDANSETRON 2 MG/ML
4 INJECTION INTRAMUSCULAR; INTRAVENOUS ONCE AS NEEDED
Status: DISCONTINUED | OUTPATIENT
Start: 2022-11-01 | End: 2022-11-01

## 2022-11-01 NOTE — OPERATIVE REPORT
[facility]  Operative Report  2022     Christy Castro Patient Status:  Hospital Outpatient Surgery    7/10/1940 MRN [id number]   Location [address] [address] Attending Mindy Torres MD   Hosp Day # 0 PCP Neli Cramer MD     Indication: Shauna Nichole is a 80year old male with lumbar radiculopathy    Imaging: Lumbar MRI    Preoperative Diagnosis:  Lumbar radiculopathy [M54.16]    Postoperative Diagnosis: Same as above. Procedure performed: RIGHT LUMBAR 4-5, LUMBAR 5-SACRAL 1 TRANSFORAMINAL EPIDURAL STEROID INJECTION MULTIPLE LEVEL with local      Anesthesia: Local     EBL: Less than 1 ml. Procedure Description:  After reviewing the patient's history and performing a focused physical examination, the diagnosis was confirmed and contraindications such as infection and coagulopathy were ruled out. Following review of potential side effects and complications, including but not necessarily limited to infection, allergic reaction, local tissue breakdown, nerve injury, and paresis, the patient indicated they understood and agreed to proceed. After obtaining the informed consent, the patient was brought to the procedure room and monitored. In the prone position, following sterile prep and drape of the lumbar region,  the  L4 neural foramen was identified under fluoroscopy. The skin and subcutaneous tissue was anesthetized via 25-gauge 1.5\" needle with approximately 2 cc of 1% lidocaine. A 22-gauge 5\" Quincke spinal needle was introduced toward the inferior aspect of the junction between the transverse process and pedicle of the  L4 level atraumatically under fluoroscopic guidance. The needle was advanced into the anterior epidural space at this level. The needle position was confirmed under AP and lateral fluoroscopic view. Following negative aspiration for CSF and blood, approximately 1 cc of Omnipaque 240 was injected.   An excellent contrast spread along the epidural space and the nerve root was obtained. At this point, 1cc of NS with 5 mg of dexamethasone was injected without complication. The needle was withdrawn with stylet in situ after being flushed with 1 cc PF lidocaine. The  L5 neural foramen was also identified under fluoroscopy. The skin and subcutaneous tissue was anesthetized via 25-gauge 1.5\" needle with approximately 2 cc of 1% lidocaine. A 22-gauge 5\" Quincke spinal needle was introduced toward the inferior aspect of the junction between the transverse process and pedicle of the L5 level atraumatically under fluoroscopic guidance. The needle was advanced into the anterior epidural space at this level. The needle position was confirmed under AP and lateral fluoroscopic view. Following negative aspiration for CSF and blood, approximately 1 cc of Omnipaque 240 was injected. An excellent contrast spread along the epidural space and the nerve root was obtained. At this point, 1cc of NS with 5 mg of dexamethasone was injected without complication. The needle was withdrawn with stylet in situ after being flushed with 1 cc PF lidocaine. .  The patient tolerated procedure very well. The patient was observed until discharge criteria met. Discharge instructions were given and patient was released to a responsible adult. Complications: None. Follow up: The patient was followed in the pain clinic as needed basis.         Robert Lund MD

## 2022-11-01 NOTE — DISCHARGE INSTRUCTIONS
Home Care Instructions Following Your Pain Procedure     Aislinn Carlson,  It has been a pleasure to have you as our patient. To help you at home, you must follow these general discharge instructions. We will review these with you before you are discharged. It is our hope that you have a complete and uneventful recovery from our procedure. General Instructions:  What to Expect:  Bandages from your procedure today can be removed when you get home. Please avoid soaking and/or swimming for 24 hours. Showering is okay  It is normal to have increased pain symptoms and/or pain at injection site for up to 3-5 days after procedure, you can use heat or ice (20 minutes on 20 minutes off) for comfort. You may experience some temporary side effects which may include restlessness or insomnia, flushing of the face, or heart palpitations. Please contact the provider if these symptoms do not resolve within 3-4 days. Lightheadedness or nausea may occur and should resolve within 24 to 48 hours. If you develop a headache after treatment, rest, drink fluids (with caffeine, if possible) and take mild over-the-counter pain medication. If the headache does not improve with the above treatment, contact the physician. Home Medications:  Resume all previously prescribed medication. Please avoid taking NSAIDs (Non-Steriodal Anti-Inflammatory Drugs) such as:  Ibuprofen ( Advil, Motrin) Aleve (Naproxen), Diclofenac, Meloxicam for 6 hours after procedure. If you are on Coumadin (Warfarin) or any other anti-coagulant (or \"blood thinning\") medication such as Plavix (Clopidogrel), Xarelto (Rivaroxaban), Eliquis (Apixaban), Effient (Prasugrel) etc., restart on the following day from the procedure unless otherwise directed by your provider. If you are a diabetic, please increase the frequency of your glucose monitoring after the procedure as steroids may cause a temporary (2-3 day) increase in your blood sugar.   Contact your primary care physician if your blood sugar remains elevated as you may require some medication adjustment. Diet:  Resume your regular diet as tolerated. Activity: We recommend that you relax and rest during the rest of your procedure day. If you feel weakness in your arms or legs do not drive. Follow-up Appointment  Please schedule a follow-up visit within 3 to 4 weeks after your last procedure date. Question or Concerns:  Feel free to call our office with any questions or concerns at [phone] (option #2)    Jesus Helton  Thank you for coming to [facility] for your procedure. The nurses try very hard to make sure you receive the best care possible. Your trust in them as well as us is greatly appreciated.     Thanks so much,   Dr. Lennox Nipple

## 2022-11-04 RX ORDER — TAMSULOSIN HYDROCHLORIDE 0.4 MG/1
CAPSULE ORAL
Qty: 90 CAPSULE | Refills: 0 | Status: SHIPPED | OUTPATIENT
Start: 2022-11-04

## 2022-11-04 NOTE — TELEPHONE ENCOUNTER
Future appt: Your appointments     Date & Time Appointment Department [facility])    Nov 09, 2022  9:30 AM CST Follow Up Visit with Monie Trejo MD [facility], [address] ([facility] Sumanth Agarwal)            Aida Payne [address] [facility] [phone]        Last Appointment:  9/14/2022  Last physical 9/14/22  Cholesterol, Total (mg/dL)   Date Value   08/29/2022 129     CHOLESTEROL (mg/dL)   Date Value   12/10/2013 129     HDL Cholesterol (mg/dL)   Date Value   08/29/2022 62     HDL CHOL (mg/dL)   Date Value   12/10/2013 54     LDL Cholesterol (mg/dL)   Date Value   04/12/2021 47     LDL CHOLESTROL (mg/dL)   Date Value   12/10/2013 65     Triglycerides (mg/dL)   Date Value   08/29/2022 42     TRIGLYCERIDES (mg/dL)   Date Value   12/10/2013 48     Lab Results   Component Value Date     09/07/2016    A1C 5.3 08/29/2022     Lab Results   Component Value Date    T4F 1.0 01/09/2018    TSH 3.050 08/29/2022       No follow-ups on file.

## 2022-11-09 ENCOUNTER — TELEPHONE (OUTPATIENT)
Dept: NEUROLOGY | Facility: CLINIC | Age: 82
End: 2022-11-09

## 2022-11-09 ENCOUNTER — OFFICE VISIT (OUTPATIENT)
Dept: PAIN CLINIC | Facility: CLINIC | Age: 82
End: 2022-11-09
Payer: MEDICARE

## 2022-11-09 VITALS
DIASTOLIC BLOOD PRESSURE: 74 MMHG | HEART RATE: 64 BPM | WEIGHT: 150 LBS | OXYGEN SATURATION: 99 % | SYSTOLIC BLOOD PRESSURE: 136 MMHG | BODY MASS INDEX: 23 KG/M2

## 2022-11-09 DIAGNOSIS — M54.16 LUMBAR RADICULOPATHY: Primary | ICD-10-CM

## 2022-11-09 DIAGNOSIS — G57.01 PIRIFORMIS SYNDROME OF RIGHT SIDE: Primary | ICD-10-CM

## 2022-11-09 PROCEDURE — 99214 OFFICE O/P EST MOD 30 MIN: CPT | Performed by: ANESTHESIOLOGY

## 2022-11-09 RX ORDER — METHOCARBAMOL 500 MG/1
500 TABLET, FILM COATED ORAL 3 TIMES DAILY
Qty: 21 TABLET | Refills: 0 | Status: SHIPPED | OUTPATIENT
Start: 2022-11-09

## 2022-11-09 NOTE — PROGRESS NOTES
Last procedure: RIGHT LUMBAR 4-5, LUMBAR 5-SACRAL 1 TRANSFORAMINAL EPIDURAL STEROID INJECTION MULTIPLE LEVEL with local  Date: 11/01/22  Percentage of relief obtained: 0  Duration of relief: na    Current Pain Score: 9

## 2022-11-09 NOTE — TELEPHONE ENCOUNTER
Question Answer   Anesthesia Type Local   Provider [facility]   Medical clearance requested (will send to Pain Navigator) No   Patient has Medicare coverage?  Yes   Comments (Please list entire procedure name here.) right piriformis injection

## 2022-11-15 ENCOUNTER — HOSPITAL ENCOUNTER (OUTPATIENT)
Facility: HOSPITAL | Age: 82
Setting detail: HOSPITAL OUTPATIENT SURGERY
Discharge: HOME | End: 2022-11-15
Attending: ANESTHESIOLOGY | Admitting: ANESTHESIOLOGY
Payer: MEDICARE

## 2022-11-15 ENCOUNTER — APPOINTMENT (OUTPATIENT)
Dept: GENERAL RADIOLOGY | Facility: HOSPITAL | Age: 82
End: 2022-11-15
Attending: ANESTHESIOLOGY
Payer: MEDICARE

## 2022-11-15 VITALS
RESPIRATION RATE: 18 BRPM | SYSTOLIC BLOOD PRESSURE: 144 MMHG | HEART RATE: 67 BPM | WEIGHT: 150 LBS | OXYGEN SATURATION: 99 % | HEIGHT: 67 IN | TEMPERATURE: 98 F | BODY MASS INDEX: 23.54 KG/M2 | DIASTOLIC BLOOD PRESSURE: 63 MMHG

## 2022-11-15 PROCEDURE — 77002 NEEDLE LOCALIZATION BY XRAY: CPT | Performed by: ANESTHESIOLOGY

## 2022-11-15 PROCEDURE — 20552 NJX 1/MLT TRIGGER POINT 1/2: CPT | Performed by: ANESTHESIOLOGY

## 2022-11-15 PROCEDURE — 3E0T33Z INTRODUCTION OF ANTI-INFLAMMATORY INTO PERIPHERAL NERVES AND PLEXI, PERCUTANEOUS APPROACH: ICD-10-PCS | Performed by: ANESTHESIOLOGY

## 2022-11-15 PROCEDURE — 3E0T3BZ INTRODUCTION OF ANESTHETIC AGENT INTO PERIPHERAL NERVES AND PLEXI, PERCUTANEOUS APPROACH: ICD-10-PCS | Performed by: ANESTHESIOLOGY

## 2022-11-15 PROCEDURE — BW1C1ZZ FLUOROSCOPY OF LOWER EXTREMITY USING LOW OSMOLAR CONTRAST: ICD-10-PCS | Performed by: ANESTHESIOLOGY

## 2022-11-15 RX ORDER — METHYLPREDNISOLONE ACETATE 40 MG/ML
INJECTION, SUSPENSION INTRA-ARTICULAR; INTRALESIONAL; INTRAMUSCULAR; SOFT TISSUE
Status: DISCONTINUED | OUTPATIENT
Start: 2022-11-15 | End: 2022-11-15

## 2022-11-15 RX ORDER — LIDOCAINE HYDROCHLORIDE 10 MG/ML
INJECTION, SOLUTION EPIDURAL; INFILTRATION; INTRACAUDAL; PERINEURAL
Status: DISCONTINUED | OUTPATIENT
Start: 2022-11-15 | End: 2022-11-15

## 2022-11-15 NOTE — OPERATIVE REPORT
[facility]  Operative Report  11/15/2022     Kim Hernandez Patient Status:  Hospital Outpatient Surgery    7/10/1940 MRN [id number]   Location [address] [address] Attending Akhil Roland MD   Hosp Day # 0 PCP Fernando Guaman MD     Indication: Artmanuela Liang is a 80year old male with piriformis syndrome    Imaging: Lumbar MRI reviewed    Preoperative Diagnosis:  Piriformis syndrome of right side [G57.01]    Postoperative Diagnosis: Same as above. Procedure performed: PIRIFORMIS MUSCLE INJECTION UNDER FLUOROSCOPY RIGHT with local    Anesthesia: Local .    EBL: Less than 1 ml. Procedure Description:  After reviewing the patient's history and performing a focused physical examination, the diagnosis was confirmed and contraindications such as infection and coagulopathy were ruled out. Following review of potential side effects and complications, including but not necessarily limited to infection, allergic reaction, local tissue breakdown, nerve injury, and paresis, the patient indicated they understood and agreed to proceed. After obtaining the informed consent, the patient was brought to the procedure room and monitored. The patient was placed prone on the procedure table. The lumbar and sacral areas were prepped and draped in sterile fashion. Under fluoroscopic guidance, the anterior and posterior aspects of the sacroiliac joint were overlapped. A point, 1.5 cm inferior lateral to the inferior edge of the corresponding SI joint was anesthetized with 5 cc of 1% lidocaine. Subsequently, a 22-gauge needle was advanced with image guidance towards the middle aspect of the corresponding piriformis musculature. After negative aspiration for heme, 1 cc of Omnipaque was injected outlining the piriformis muscle belly. Thereafter, methylprednisolone 40 mg with 4 cc of 1% lidocaine was injected. The needle was then flushed and withdrawn tip intact.     The patient tolerated the procedure very well. There was no subjective or objective loss of motor strength. The patient was observed until discharge criteria met. Discharge instructions were given and patient was released to a responsible adult. Complications: None. Follow up: The patient was followed in the pain clinic as needed basis.       Radha Hanley MD

## 2022-11-15 NOTE — DISCHARGE INSTRUCTIONS
Home Care Instructions Following Your Pain Procedure     Digna Christie,  It has been a pleasure to have you as our patient. To help you at home, you must follow these general discharge instructions. We will review these with you before you are discharged. It is our hope that you have a complete and uneventful recovery from our procedure. General Instructions:  What to Expect:  Bandages from your procedure today can be removed when you get home. Please avoid soaking and/or swimming for 24 hours. Showering is okay  It is normal to have increased pain symptoms and/or pain at injection site for up to 3-5 days after procedure, you can use heat or ice (20 minutes on 20 minutes off) for comfort. You may experience some temporary side effects which may include restlessness or insomnia, flushing of the face, or heart palpitations. Please contact the provider if these symptoms do not resolve within 3-4 days. Lightheadedness or nausea may occur and should resolve within 24 to 48 hours. If you develop a headache after treatment, rest, drink fluids (with caffeine, if possible) and take mild over-the-counter pain medication. If the headache does not improve with the above treatment, contact the physician. Home Medications:  Resume all previously prescribed medication. Please avoid taking NSAIDs (Non-Steriodal Anti-Inflammatory Drugs) such as:  Ibuprofen ( Advil, Motrin) Aleve (Naproxen), Diclofenac, Meloxicam for 6 hours after procedure. If you are on Coumadin (Warfarin) or any other anti-coagulant (or \"blood thinning\") medication such as Plavix (Clopidogrel), Xarelto (Rivaroxaban), Eliquis (Apixaban), Effient (Prasugrel) etc., restart on the following day from the procedure unless otherwise directed by your provider. If you are a diabetic, please increase the frequency of your glucose monitoring after the procedure as steroids may cause a temporary (2-3 day) increase in your blood sugar.   Contact your primary care physician if your blood sugar remains elevated as you may require some medication adjustment. Diet:  Resume your regular diet as tolerated. Activity: We recommend that you relax and rest during the rest of your procedure day. If you feel weakness in your arms or legs do not drive. Follow-up Appointment  Please schedule a follow-up visit within 3 to 4 weeks after your last procedure date. Question or Concerns:  Feel free to call our office with any questions or concerns at [phone] (option #2)    Jeffy Lincoln  Thank you for coming to [facility] for your procedure. The nurses try very hard to make sure you receive the best care possible. Your trust in them as well as us is greatly appreciated.     Thanks so much,   Dr. Shala Valdivia

## 2022-11-16 ENCOUNTER — TELEPHONE (OUTPATIENT)
Dept: NEUROLOGY | Facility: CLINIC | Age: 82
End: 2022-11-16

## 2022-11-16 NOTE — TELEPHONE ENCOUNTER
Juan called placed to patient for post procedure follow up. Patient stated he is feeling well, discomfort is less. Informed patient that soreness is to be expected after the procedure. Educated patient that it takes 3-5 days for the steroid to be effective and to allow adequate time for medication to work. Encouraged patient to alternate ice and heat and to take medications as prescribed. Pt verbalized understanding to call with any questions or concerns.       Procedure: Right Piriformis Injection  Date: 11/15/2022  Follow up Visit Scheduled: 11/30/2022 @ 10:30AM

## 2022-11-25 ENCOUNTER — VIRTUAL PHONE E/M (OUTPATIENT)
Dept: INTERNAL MEDICINE CLINIC | Facility: CLINIC | Age: 82
End: 2022-11-25
Payer: MEDICARE

## 2022-11-25 ENCOUNTER — HOSPITAL ENCOUNTER (OUTPATIENT)
Age: 82
Discharge: HOME | End: 2022-11-25
Payer: MEDICARE

## 2022-11-25 VITALS
SYSTOLIC BLOOD PRESSURE: 136 MMHG | WEIGHT: 148 LBS | BODY MASS INDEX: 23.23 KG/M2 | HEIGHT: 67 IN | TEMPERATURE: 98 F | RESPIRATION RATE: 17 BRPM | OXYGEN SATURATION: 97 % | HEART RATE: 83 BPM | DIASTOLIC BLOOD PRESSURE: 66 MMHG

## 2022-11-25 DIAGNOSIS — U07.1 COVID-19: Primary | ICD-10-CM

## 2022-11-25 DIAGNOSIS — U07.1 COVID-19 VIRUS INFECTION: Primary | ICD-10-CM

## 2022-11-25 LAB — SARS-COV-2 RNA RESP QL NAA+PROBE: DETECTED

## 2022-11-25 PROCEDURE — U0002 COVID-19 LAB TEST NON-CDC: HCPCS | Performed by: NURSE PRACTITIONER

## 2022-11-25 PROCEDURE — 99203 OFFICE O/P NEW LOW 30 MIN: CPT | Performed by: NURSE PRACTITIONER

## 2022-11-25 NOTE — PROGRESS NOTES
Virtual Telephone Check-In    Jatinder Lara verbally consents to a Virtual/Telephone Check-In visit on 11/25/22. Patient has been referred to the [facility] website at [email or web address] to review the yearly Consent to Treat document. Patient understands and accepts financial responsibility for any deductible, co-insurance and/or co-pays associated with this service. Duration of the service: 15 minutes audio only      Summary of topics discussed:     2 days of cough, sore throat, no fever (but in 2000/day Tylenol for Back). Brother came into town sick, now Yg Luna is +covid today at immediate care. Told no Paxlovid due to drug interactions    On phone hoarse, not SOB, told C,D, Zinc, Kojo DM, added Molnupiravir (high risk age 80 COPD), and told to watch O2 sats, and call if ER if falls <90 currently 80.  Return virtual 11/28        Arianna Steele MD

## 2022-11-25 NOTE — DISCHARGE INSTRUCTIONS
Rest and drink plenty of fluids. This will help to thin the mucous in the back of your throat. Take Tylenol and/or ibuprofen as needed for pain or fever. Use Zyrtec, Claritin, or Allegra to help with nasal drainage. You can take Coricidin HBP for cold symptoms. Take Robitussin or Delsym as needed for cough. Follow up with your PCP in 5-7 days. Go to the ER for any shortness of breath or chest pain. Your symptoms should improve in the next 7-10 days; however, the cough can linger for much longer. Thank you for choosing [address] [address] for your care.

## 2022-11-28 ENCOUNTER — VIRTUAL PHONE E/M (OUTPATIENT)
Dept: INTERNAL MEDICINE CLINIC | Facility: CLINIC | Age: 82
End: 2022-11-28
Payer: MEDICARE

## 2022-11-28 DIAGNOSIS — U07.1 COVID-19 VIRUS INFECTION: Primary | ICD-10-CM

## 2022-11-28 DIAGNOSIS — M48.062 SPINAL STENOSIS OF LUMBAR REGION WITH NEUROGENIC CLAUDICATION: ICD-10-CM

## 2022-11-28 NOTE — PROGRESS NOTES
Virtual Telephone Check-In    Jose Daniel Dee verbally consents to a Virtual/Telephone Check-In visit on 11/28/22. Patient has been referred to the [facility] website at [email or web address] to review the yearly Consent to Treat document. Patient understands and accepts financial responsibility for any deductible, co-insurance and/or co-pays associated with this service. Duration of the service: 15 minutes      Summary of topics discussed:     Covid+ on Molnupiravir almost no symptoms. O2 sats mid 90s. Sounds ok on phone. Told callback if worsens. 2nd problem severe back pain unrelieved by PT and multiple injections by Sarah Larson. Declined acupuncture. Seen last 11/9 next OV 11/30 will postpone 1 more week due to Covid. Last MRI 3/2022 will repeat and will get surgical opinion.          Day Oneil MD

## 2022-12-08 ENCOUNTER — HOSPITAL ENCOUNTER (OUTPATIENT)
Dept: MRI IMAGING | Facility: HOSPITAL | Age: 82
Discharge: HOME | End: 2022-12-08
Attending: INTERNAL MEDICINE
Payer: MEDICARE

## 2022-12-08 DIAGNOSIS — M48.062 SPINAL STENOSIS OF LUMBAR REGION WITH NEUROGENIC CLAUDICATION: ICD-10-CM

## 2022-12-08 PROCEDURE — 72148 MRI LUMBAR SPINE W/O DYE: CPT | Performed by: INTERNAL MEDICINE

## 2022-12-09 ENCOUNTER — OFFICE VISIT (OUTPATIENT)
Dept: PAIN CLINIC | Facility: CLINIC | Age: 82
End: 2022-12-09
Payer: MEDICARE

## 2022-12-09 ENCOUNTER — TELEPHONE (OUTPATIENT)
Dept: ORTHOPEDICS CLINIC | Facility: CLINIC | Age: 82
End: 2022-12-09

## 2022-12-09 VITALS — SYSTOLIC BLOOD PRESSURE: 138 MMHG | HEART RATE: 75 BPM | DIASTOLIC BLOOD PRESSURE: 62 MMHG | OXYGEN SATURATION: 98 %

## 2022-12-09 DIAGNOSIS — M54.16 LUMBAR RADICULOPATHY: Primary | ICD-10-CM

## 2022-12-09 DIAGNOSIS — M54.50 BILATERAL LOW BACK PAIN WITHOUT SCIATICA, UNSPECIFIED CHRONICITY: Primary | ICD-10-CM

## 2022-12-09 PROCEDURE — 99215 OFFICE O/P EST HI 40 MIN: CPT | Performed by: ANESTHESIOLOGY

## 2022-12-09 NOTE — TELEPHONE ENCOUNTER
Reviewed patients chart, xray orders are required. Order placed for lumbar spine xrays. Please contact patient advise to arrive 30 mins prior to patients appt to complete x-ray order and schedule patients xray appt-Thank you    Last Imaging  MRI SPINE LUMBAR (CPT=72148)  Narrative: PROCEDURE:  MRI SPINE LUMBAR (CPT=72148)     LOCATION:  [facility] , MR, MRI SPINE LUMBAR (CPT=72148), 3/14/2022, 8:31 AM.     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 Images acquired in sagittal and axial planes. PATIENT STATED HISTORY: (As transcribed by Technologist)  Patient states symptoms have resolved recently but had low back pain radiating down right leg,          FINDINGS:       Mild to moderate dextroscoliosis of the thoracolumbar spine. The multilevel degenerative findings in the lumbar spine are overall similar in extent since 3/14/2022. There is lumbar lordosis. There is mild stepwise retrolisthesis from L1 through L4. The vertebral body heights are maintained. There is scattered disc desiccation. There is moderate disc height loss at L2-L3. There are multilevel endplate degenerative changes. The conus and cauda equina nerve roots are unremarkable in caliber and   signal.  There are multiple prominent renal cysts, incompletely assessed on this lumbar spine MRI. T12-L1:  Mild facet hypertrophy. Mild disc bulge. No spinal canal stenosis. Mild bilateral foraminal narrowing. L1-L2:  Mild to moderate disc bulge. Mild to moderate facet hypertrophy. No spinal canal stenosis. Moderate to severe left and moderate right foraminal narrowing. L2-L3:  Mild to moderate facet hypertrophy with mild ligamentum flavum thickening. Mild to moderate posterior disc osteophyte complex extending to the far left lateral region. No central canal stenosis. Moderate to severe left and moderate right   foraminal narrowing. L3-L4:  Moderate facet hypertrophy with moderate ligamentum flavum thickening. Mild to moderate disc bulge. Moderate spinal canal stenosis. Moderate bilateral foraminal narrowing, left greater than right. L4-5:  Severe facet hypertrophy with severe ligamentum flavum thickening. Mild to moderate disc bulge. Severe spinal canal stenosis. Bilateral subarticular zone narrowing. Moderate to severe right and moderate left foraminal narrowing. L5-S1:  Severe right and moderate left facet hypertrophy. Moderate ligamentum flavum thickening. Mild to moderate posterior disc osteophyte complex. Mild central canal stenosis. There is right-sided subarticular zone narrowing. There is a suggestion   of a right paracentral small disc protrusion causing right subarticular zone effacement which was also present on 3/14/2022. Mild central canal stenosis. Mild to moderate bilateral foraminal narrowing, right greater than left. Impression: CONCLUSION:  Moderate to severe degenerative changes in the lumbar spine, similar in extent since 3/14/2022. There is severe spinal canal stenosis again seen at L4-5. There is severe right subarticular zone effacement again seen at L5-S1 likely   secondary to a small disc protrusion. Please see above for further details.         Dictated by (CST): Arianna Cox MD on 12/08/2022 at 1:34 PM       Finalized by (CST): Arianna Cox MD on 12/08/2022 at 1:48 PM          Future Appointments   Date Time Provider Katherine Cwoan   12/13/2022  2:30 PM Raheem Ford MD [facility]

## 2022-12-09 NOTE — TELEPHONE ENCOUNTER
Patient is scheduled with Dr. Veronica Chester for a herniated disc L4-L5. Patient was referred by Dr. More Harris and has an MRI in [facility]. Please advise if further imaging needed.      Future Appointments   Date Time Provider Katherine Camryn   12/13/2022  2:30 PM Taylor Jama MD [facility]

## 2022-12-13 ENCOUNTER — OFFICE VISIT (OUTPATIENT)
Dept: ORTHOPEDICS CLINIC | Facility: CLINIC | Age: 82
End: 2022-12-13
Payer: MEDICARE

## 2022-12-13 ENCOUNTER — HOSPITAL ENCOUNTER (OUTPATIENT)
Dept: GENERAL RADIOLOGY | Age: 82
Discharge: HOME | End: 2022-12-13
Attending: STUDENT IN AN ORGANIZED HEALTH CARE EDUCATION/TRAINING PROGRAM
Payer: MEDICARE

## 2022-12-13 VITALS — WEIGHT: 148 LBS | HEIGHT: 67 IN | BODY MASS INDEX: 23.23 KG/M2

## 2022-12-13 DIAGNOSIS — M54.50 BILATERAL LOW BACK PAIN WITHOUT SCIATICA, UNSPECIFIED CHRONICITY: ICD-10-CM

## 2022-12-13 DIAGNOSIS — M48.062 LUMBAR STENOSIS WITH NEUROGENIC CLAUDICATION: Primary | ICD-10-CM

## 2022-12-13 PROCEDURE — 72100 X-RAY EXAM L-S SPINE 2/3 VWS: CPT | Performed by: STUDENT IN AN ORGANIZED HEALTH CARE EDUCATION/TRAINING PROGRAM

## 2022-12-13 PROCEDURE — 99205 OFFICE O/P NEW HI 60 MIN: CPT | Performed by: STUDENT IN AN ORGANIZED HEALTH CARE EDUCATION/TRAINING PROGRAM

## 2022-12-23 ENCOUNTER — HOSPITAL ENCOUNTER (OUTPATIENT)
Age: 82
Discharge: HOME | End: 2022-12-23
Payer: MEDICARE

## 2022-12-23 VITALS
SYSTOLIC BLOOD PRESSURE: 131 MMHG | TEMPERATURE: 98 F | DIASTOLIC BLOOD PRESSURE: 70 MMHG | HEART RATE: 70 BPM | OXYGEN SATURATION: 96 % | RESPIRATION RATE: 16 BRPM

## 2022-12-23 DIAGNOSIS — J06.9 VIRAL URI: Primary | ICD-10-CM

## 2022-12-23 DIAGNOSIS — R19.7 DIARRHEA, UNSPECIFIED TYPE: ICD-10-CM

## 2022-12-23 DIAGNOSIS — J34.89 RHINORRHEA: ICD-10-CM

## 2022-12-23 LAB — SARS-COV-2 RNA RESP QL NAA+PROBE: NOT DETECTED

## 2022-12-23 PROCEDURE — U0002 COVID-19 LAB TEST NON-CDC: HCPCS | Performed by: PHYSICIAN ASSISTANT

## 2022-12-23 PROCEDURE — 99213 OFFICE O/P EST LOW 20 MIN: CPT | Performed by: PHYSICIAN ASSISTANT

## 2023-01-03 RX ORDER — OMEPRAZOLE 40 MG/1
40 CAPSULE, DELAYED RELEASE ORAL DAILY
Qty: 90 CAPSULE | Refills: 3 | Status: SHIPPED | OUTPATIENT
Start: 2023-01-03 | End: 2023-12-29

## 2023-01-10 ENCOUNTER — OFFICE VISIT (OUTPATIENT)
Dept: INTERNAL MEDICINE CLINIC | Facility: CLINIC | Age: 83
End: 2023-01-10
Payer: MEDICARE

## 2023-01-10 VITALS
RESPIRATION RATE: 16 BRPM | HEIGHT: 67 IN | HEART RATE: 80 BPM | TEMPERATURE: 98 F | SYSTOLIC BLOOD PRESSURE: 130 MMHG | BODY MASS INDEX: 23.7 KG/M2 | OXYGEN SATURATION: 98 % | WEIGHT: 151 LBS | DIASTOLIC BLOOD PRESSURE: 64 MMHG

## 2023-01-10 DIAGNOSIS — M48.061 SPINAL STENOSIS OF LUMBAR REGION WITHOUT NEUROGENIC CLAUDICATION: Primary | ICD-10-CM

## 2023-01-10 PROCEDURE — 99213 OFFICE O/P EST LOW 20 MIN: CPT | Performed by: INTERNAL MEDICINE

## 2023-01-10 RX ORDER — ERGOCALCIFEROL 1.25 MG/1
50000 CAPSULE ORAL
COMMUNITY
Start: 2023-01-02

## 2023-01-10 NOTE — PROGRESS NOTES
30 min all discussion of his problems with lumbar spinal stenosis at L4-L5 level. Had severe pain and immobility months of oct-Nov with Covid added end Nov. Dec miraculously almost pain-free on no meds! Now in Jan mild right sciatica starting again-only Tylenol no Robaxin needed. Nsaids limited by hypertensive heart disease-though can increase BP meds and acid reflux-though can increase Omeprazole also. Prednisone has other issues. Has had shots and PT no help. Had one surgical opinion  and another with Diego Carter coming 1/27 (has already seen his PA). Given options for subacute [facility], '[facility] and [facility] postop with full or partial Medicare coverage-his age and home situation does NOT allow discharge home immediately postop.

## 2023-01-26 RX ORDER — TAMSULOSIN HYDROCHLORIDE 0.4 MG/1
CAPSULE ORAL
Qty: 90 CAPSULE | Refills: 0 | Status: SHIPPED | OUTPATIENT
Start: 2023-01-26

## 2023-01-26 NOTE — TELEPHONE ENCOUNTER
Tamsulosin 0.4 mg 1 cap daily filled 11/4/22 90 with 0 refills     LOV 1/10/23  Return in about 24 days (around 2/3/2023).   Next apt 2/3/23  Labs 8/29/22  PSA Screen  ng/mL 2.45

## 2023-01-31 NOTE — TELEPHONE ENCOUNTER
Future Appointments   Date Time Provider Katherine Cowan   12/13/2022  1:45 PM [facility] [place]   12/13/2022  2:30 PM Brigitte Dias MD [facility]       Patient is scheduled for xray and advised to complete prior to appt. 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 premalignant lesion left posterior neck by James--pt and wife do not recall specifically\par \par Interval hx (12/7/22): Pt is POD# 7 s/p scalp wound closure with local flap. Doing well. Denies fever/chills/cp/n/v. With chronic RUSSELL due to smoking hx. No issues with wound, denies drainage. \par \par Interval hx (12/14/22): Pt presents today POD# 14 s/p Moh's scalp wound closure with local flap. Doing well with no significant pain, f/c or bleeding. Happy with results.

## 2023-02-03 ENCOUNTER — OFFICE VISIT (OUTPATIENT)
Dept: INTERNAL MEDICINE CLINIC | Facility: CLINIC | Age: 83
End: 2023-02-03
Payer: MEDICARE

## 2023-02-03 VITALS
BODY MASS INDEX: 24 KG/M2 | OXYGEN SATURATION: 98 % | TEMPERATURE: 98 F | HEART RATE: 72 BPM | DIASTOLIC BLOOD PRESSURE: 74 MMHG | RESPIRATION RATE: 16 BRPM | WEIGHT: 151 LBS | SYSTOLIC BLOOD PRESSURE: 142 MMHG

## 2023-02-03 DIAGNOSIS — M48.061 SPINAL STENOSIS OF LUMBAR REGION WITHOUT NEUROGENIC CLAUDICATION: Primary | ICD-10-CM

## 2023-02-03 PROCEDURE — 99213 OFFICE O/P EST LOW 20 MIN: CPT | Performed by: INTERNAL MEDICINE

## 2023-02-03 RX ORDER — ACETAMINOPHEN 500 MG
500 TABLET ORAL 3 TIMES DAILY
COMMUNITY

## 2023-02-07 ENCOUNTER — PATIENT MESSAGE (OUTPATIENT)
Dept: INTERNAL MEDICINE CLINIC | Facility: CLINIC | Age: 83
End: 2023-02-07

## 2023-02-07 NOTE — TELEPHONE ENCOUNTER
From: Anil Mcmahan  To: Abran Martinez MD  Sent: 2/7/2023 2:10 PM CST  Subject: The 2/6/23 message from Dr Dominga Antoine to you concerning test required before surgery    Dr Robert Wallis, A number of test was listed in yesterdays message that I need to complete, such as a   comprehensive metabolic panel, chest x-ray, and several others. Would you please contact me as to  when I can come in to get started with the testing? Also do I need to contact anyone else?     Thank you,  Richy Aldana  [birth year]  [phone]

## 2023-02-21 ENCOUNTER — TELEPHONE (OUTPATIENT)
Dept: CASE MANAGEMENT | Facility: HOSPITAL | Age: 83
End: 2023-02-21

## 2023-02-21 ENCOUNTER — LAB ENCOUNTER (OUTPATIENT)
Dept: LAB | Age: 83
End: 2023-02-21
Attending: INTERNAL MEDICINE
Payer: MEDICARE

## 2023-02-21 ENCOUNTER — OFFICE VISIT (OUTPATIENT)
Dept: INTERNAL MEDICINE CLINIC | Facility: CLINIC | Age: 83
End: 2023-02-21
Payer: MEDICARE

## 2023-02-21 ENCOUNTER — HOSPITAL ENCOUNTER (OUTPATIENT)
Dept: GENERAL RADIOLOGY | Facility: HOSPITAL | Age: 83
Discharge: HOME | End: 2023-02-21
Attending: INTERNAL MEDICINE
Payer: MEDICARE

## 2023-02-21 VITALS
WEIGHT: 149 LBS | RESPIRATION RATE: 16 BRPM | DIASTOLIC BLOOD PRESSURE: 60 MMHG | SYSTOLIC BLOOD PRESSURE: 139 MMHG | HEIGHT: 67 IN | OXYGEN SATURATION: 96 % | HEART RATE: 61 BPM | TEMPERATURE: 97 F | BODY MASS INDEX: 23.39 KG/M2

## 2023-02-21 DIAGNOSIS — Z01.818 PREOPERATIVE CLEARANCE: Primary | ICD-10-CM

## 2023-02-21 DIAGNOSIS — M48.061 SPINAL STENOSIS OF LUMBAR REGION WITHOUT NEUROGENIC CLAUDICATION: ICD-10-CM

## 2023-02-21 DIAGNOSIS — M43.00 SPONDYLOLYSIS: ICD-10-CM

## 2023-02-21 DIAGNOSIS — Z01.818 PRE-OP TESTING: ICD-10-CM

## 2023-02-21 LAB
ALBUMIN SERPL-MCNC: 4 G/DL (ref 3.4–5)
ALBUMIN/GLOB SERPL: 1.2 {RATIO} (ref 1–2)
ALP LIVER SERPL-CCNC: 76 U/L
ALT SERPL-CCNC: 34 U/L
ANION GAP SERPL CALC-SCNC: 4 MMOL/L (ref 0–18)
APTT PPP: 25.4 SECONDS (ref 23.3–35.6)
AST SERPL-CCNC: 35 U/L (ref 15–37)
ATRIAL RATE: 55 BPM
BASOPHILS # BLD AUTO: 0.04 X10(3) UL (ref 0–0.2)
BASOPHILS NFR BLD AUTO: 0.7 %
BILIRUB SERPL-MCNC: 0.8 MG/DL (ref 0.1–2)
BILIRUB UR QL STRIP.AUTO: NEGATIVE
BUN BLD-MCNC: 19 MG/DL (ref 7–18)
CALCIUM BLD-MCNC: 9.6 MG/DL (ref 8.5–10.1)
CHLORIDE SERPL-SCNC: 106 MMOL/L (ref 98–112)
CLARITY UR REFRACT.AUTO: CLEAR
CO2 SERPL-SCNC: 28 MMOL/L (ref 21–32)
CREAT BLD-MCNC: 0.87 MG/DL
EOSINOPHIL # BLD AUTO: 0.15 X10(3) UL (ref 0–0.7)
EOSINOPHIL NFR BLD AUTO: 2.6 %
ERYTHROCYTE [DISTWIDTH] IN BLOOD BY AUTOMATED COUNT: 12.5 %
FASTING STATUS PATIENT QL REPORTED: YES
GFR SERPLBLD BASED ON 1.73 SQ M-ARVRAT: 86 ML/MIN/1.73M2 (ref 60–?)
GLOBULIN PLAS-MCNC: 3.3 G/DL (ref 2.8–4.4)
GLUCOSE BLD-MCNC: 88 MG/DL (ref 70–99)
GLUCOSE UR STRIP.AUTO-MCNC: NEGATIVE MG/DL
HCT VFR BLD AUTO: 42.2 %
HGB BLD-MCNC: 14.2 G/DL
IMM GRANULOCYTES # BLD AUTO: 0.01 X10(3) UL (ref 0–1)
IMM GRANULOCYTES NFR BLD: 0.2 %
INR BLD: 1.09 (ref 0.85–1.16)
KETONES UR STRIP.AUTO-MCNC: NEGATIVE MG/DL
LEUKOCYTE ESTERASE UR QL STRIP.AUTO: NEGATIVE
LYMPHOCYTES # BLD AUTO: 1.78 X10(3) UL (ref 1–4)
LYMPHOCYTES NFR BLD AUTO: 31.2 %
MCH RBC QN AUTO: 32.3 PG (ref 26–34)
MCHC RBC AUTO-ENTMCNC: 33.6 G/DL (ref 31–37)
MCV RBC AUTO: 96.1 FL
MONOCYTES # BLD AUTO: 0.67 X10(3) UL (ref 0.1–1)
MONOCYTES NFR BLD AUTO: 11.7 %
NEUTROPHILS # BLD AUTO: 3.06 X10 (3) UL (ref 1.5–7.7)
NEUTROPHILS # BLD AUTO: 3.06 X10(3) UL (ref 1.5–7.7)
NEUTROPHILS NFR BLD AUTO: 53.6 %
NITRITE UR QL STRIP.AUTO: NEGATIVE
OSMOLALITY SERPL CALC.SUM OF ELEC: 288 MOSM/KG (ref 275–295)
P AXIS: 81 DEGREES
P-R INTERVAL: 172 MS
PH UR STRIP.AUTO: 7 [PH] (ref 5–8)
PLATELET # BLD AUTO: 177 10(3)UL (ref 150–450)
POTASSIUM SERPL-SCNC: 3.8 MMOL/L (ref 3.5–5.1)
PROT SERPL-MCNC: 7.3 G/DL (ref 6.4–8.2)
PROT UR STRIP.AUTO-MCNC: NEGATIVE MG/DL
PROTHROMBIN TIME: 14.1 SECONDS (ref 11.6–14.8)
Q-T INTERVAL: 410 MS
QRS DURATION: 86 MS
QTC CALCULATION (BEZET): 392 MS
R AXIS: 61 DEGREES
RBC # BLD AUTO: 4.39 X10(6)UL
RBC UR QL AUTO: NEGATIVE
SODIUM SERPL-SCNC: 138 MMOL/L (ref 136–145)
SP GR UR STRIP.AUTO: 1.01 (ref 1–1.03)
T AXIS: 62 DEGREES
UROBILINOGEN UR STRIP.AUTO-MCNC: <2 MG/DL
VENTRICULAR RATE: 55 BPM
WBC # BLD AUTO: 5.7 X10(3) UL (ref 4–11)

## 2023-02-21 PROCEDURE — 87086 URINE CULTURE/COLONY COUNT: CPT | Performed by: INTERNAL MEDICINE

## 2023-02-21 PROCEDURE — 36415 COLL VENOUS BLD VENIPUNCTURE: CPT

## 2023-02-21 PROCEDURE — 85730 THROMBOPLASTIN TIME PARTIAL: CPT

## 2023-02-21 PROCEDURE — 99214 OFFICE O/P EST MOD 30 MIN: CPT | Performed by: INTERNAL MEDICINE

## 2023-02-21 PROCEDURE — 85610 PROTHROMBIN TIME: CPT

## 2023-02-21 PROCEDURE — 80053 COMPREHEN METABOLIC PANEL: CPT

## 2023-02-21 PROCEDURE — 93000 ELECTROCARDIOGRAM COMPLETE: CPT | Performed by: INTERNAL MEDICINE

## 2023-02-21 PROCEDURE — 81003 URINALYSIS AUTO W/O SCOPE: CPT | Performed by: INTERNAL MEDICINE

## 2023-02-21 PROCEDURE — 71046 X-RAY EXAM CHEST 2 VIEWS: CPT | Performed by: INTERNAL MEDICINE

## 2023-02-21 PROCEDURE — 85025 COMPLETE CBC W/AUTO DIFF WBC: CPT

## 2023-02-21 NOTE — CM/SW NOTE
Received call from Dr Kayleigh Jordan, pt's PCP who stated pt with planned lumbar surgery with Dr Jose Ortiz on 3/8/23. Pt lives alone and MD recommends [facility] at DC. Pt/MD with preference for [facility]. Pt with Medicare insurance and BCBS supplement. SW to follow on admission for DC planning and anticipated need for JUSTEN at [facility]. / to remain available for support and/or discharge planning.      Sherif Valiente, [facility]  Discharge Planner  [phone]

## 2023-02-23 ENCOUNTER — HOSPITAL ENCOUNTER (OUTPATIENT)
Age: 83
Discharge: HOME | End: 2023-02-23
Payer: MEDICARE

## 2023-02-23 ENCOUNTER — TELEPHONE (OUTPATIENT)
Dept: INTERNAL MEDICINE CLINIC | Facility: CLINIC | Age: 83
End: 2023-02-23

## 2023-02-23 ENCOUNTER — LAB ENCOUNTER (OUTPATIENT)
Dept: LAB | Facility: HOSPITAL | Age: 83
End: 2023-02-23
Attending: ORTHOPAEDIC SURGERY
Payer: MEDICARE

## 2023-02-23 VITALS
TEMPERATURE: 98 F | HEART RATE: 75 BPM | DIASTOLIC BLOOD PRESSURE: 52 MMHG | OXYGEN SATURATION: 97 % | RESPIRATION RATE: 16 BRPM | WEIGHT: 150 LBS | HEIGHT: 67 IN | BODY MASS INDEX: 23.54 KG/M2 | SYSTOLIC BLOOD PRESSURE: 120 MMHG

## 2023-02-23 DIAGNOSIS — R68.89 FLU-LIKE SYMPTOMS: ICD-10-CM

## 2023-02-23 DIAGNOSIS — R19.7 NAUSEA VOMITING AND DIARRHEA: Primary | ICD-10-CM

## 2023-02-23 DIAGNOSIS — R11.2 NAUSEA VOMITING AND DIARRHEA: Primary | ICD-10-CM

## 2023-02-23 DIAGNOSIS — M43.00 SPONDYLOLYSIS: ICD-10-CM

## 2023-02-23 LAB
#MXD IC: 0.2 X10ˆ3/UL (ref 0.1–1)
BUN BLD-MCNC: 22 MG/DL (ref 7–18)
CHLORIDE BLD-SCNC: 103 MMOL/L (ref 98–112)
CO2 BLD-SCNC: 24 MMOL/L (ref 21–32)
CREAT BLD-MCNC: 0.9 MG/DL
GFR SERPLBLD BASED ON 1.73 SQ M-ARVRAT: 85 ML/MIN/1.73M2 (ref 60–?)
GLUCOSE BLD-MCNC: 107 MG/DL (ref 70–99)
HCT VFR BLD AUTO: 40.6 %
HCT VFR BLD CALC: 44 %
HGB BLD-MCNC: 13.5 G/DL
ISTAT IONIZED CALCIUM FOR CHEM 8: 1.22 MMOL/L (ref 1.12–1.32)
LYMPHOCYTES # BLD AUTO: 0.3 X10ˆ3/UL (ref 1–4)
LYMPHOCYTES NFR BLD AUTO: 5 %
MCH RBC QN AUTO: 32.2 PG (ref 26–34)
MCHC RBC AUTO-ENTMCNC: 33.3 G/DL (ref 31–37)
MCV RBC AUTO: 96.9 FL (ref 80–100)
MIXED CELL %: 3.8 %
NEUTROPHILS # BLD AUTO: 5.6 X10ˆ3/UL (ref 1.5–7.7)
NEUTROPHILS NFR BLD AUTO: 91.2 %
PLATELET # BLD AUTO: 171 X10ˆ3/UL (ref 150–450)
POCT INFLUENZA A: NEGATIVE
POCT INFLUENZA B: NEGATIVE
POTASSIUM BLD-SCNC: 3.9 MMOL/L (ref 3.6–5.1)
RBC # BLD AUTO: 4.19 X10ˆ6/UL
SARS-COV-2 RNA RESP QL NAA+PROBE: NOT DETECTED
SODIUM BLD-SCNC: 140 MMOL/L (ref 136–145)
WBC # BLD AUTO: 6.1 X10ˆ3/UL (ref 4–11)

## 2023-02-23 PROCEDURE — U0002 COVID-19 LAB TEST NON-CDC: HCPCS | Performed by: PHYSICIAN ASSISTANT

## 2023-02-23 PROCEDURE — 96365 THER/PROPH/DIAG IV INF INIT: CPT | Performed by: PHYSICIAN ASSISTANT

## 2023-02-23 PROCEDURE — 99213 OFFICE O/P EST LOW 20 MIN: CPT | Performed by: PHYSICIAN ASSISTANT

## 2023-02-23 PROCEDURE — 87081 CULTURE SCREEN ONLY: CPT

## 2023-02-23 PROCEDURE — 87502 INFLUENZA DNA AMP PROBE: CPT | Performed by: PHYSICIAN ASSISTANT

## 2023-02-23 PROCEDURE — 80047 BASIC METABLC PNL IONIZED CA: CPT | Performed by: PHYSICIAN ASSISTANT

## 2023-02-23 PROCEDURE — 96361 HYDRATE IV INFUSION ADD-ON: CPT | Performed by: PHYSICIAN ASSISTANT

## 2023-02-23 PROCEDURE — 85025 COMPLETE CBC W/AUTO DIFF WBC: CPT | Performed by: PHYSICIAN ASSISTANT

## 2023-02-23 RX ORDER — ONDANSETRON 2 MG/ML
4 INJECTION INTRAMUSCULAR; INTRAVENOUS ONCE
Status: COMPLETED | OUTPATIENT
Start: 2023-02-23 | End: 2023-02-23

## 2023-02-23 RX ORDER — ONDANSETRON 4 MG/1
4 TABLET, ORALLY DISINTEGRATING ORAL EVERY 4 HOURS PRN
Qty: 20 TABLET | Refills: 0 | Status: SHIPPED | OUTPATIENT
Start: 2023-02-23

## 2023-02-23 RX ORDER — SODIUM CHLORIDE 9 MG/ML
1000 INJECTION, SOLUTION INTRAVENOUS ONCE
Status: COMPLETED | OUTPATIENT
Start: 2023-02-23 | End: 2023-02-23

## 2023-02-23 NOTE — DISCHARGE INSTRUCTIONS
Please return to the ER/clinic if symptoms worsen. Follow-up with your PCP in 24-48 hours as needed. Continue to push fluids. Follow a BRAT dietary plan i.e. bananas rice applesauce tea, dry toast etc.  Take the Zofran for nausea as needed. For the runny nose recommend picking up an over-the-counter antihistamine daily i.e. Zyrtec or Claritin. If anything changes i.e. abdominal pain fevers etc. go directly to the emergency room. Otherwise follow-up with your primary care physician for further evaluation and treatment.

## 2023-02-27 ENCOUNTER — OFFICE VISIT (OUTPATIENT)
Dept: INTERNAL MEDICINE CLINIC | Facility: CLINIC | Age: 83
End: 2023-02-27
Payer: MEDICARE

## 2023-02-27 VITALS
DIASTOLIC BLOOD PRESSURE: 70 MMHG | SYSTOLIC BLOOD PRESSURE: 142 MMHG | BODY MASS INDEX: 24 KG/M2 | WEIGHT: 153 LBS | HEART RATE: 70 BPM | TEMPERATURE: 98 F | OXYGEN SATURATION: 97 % | RESPIRATION RATE: 16 BRPM

## 2023-02-27 DIAGNOSIS — A08.4 VIRAL GASTROENTERITIS: ICD-10-CM

## 2023-02-27 DIAGNOSIS — Z01.818 PREOPERATIVE CLEARANCE: Primary | ICD-10-CM

## 2023-02-27 DIAGNOSIS — M48.062 SPINAL STENOSIS OF LUMBAR REGION WITH NEUROGENIC CLAUDICATION: ICD-10-CM

## 2023-02-27 PROCEDURE — 99214 OFFICE O/P EST MOD 30 MIN: CPT | Performed by: INTERNAL MEDICINE

## 2023-03-06 ENCOUNTER — LAB ENCOUNTER (OUTPATIENT)
Dept: LAB | Facility: HOSPITAL | Age: 83
End: 2023-03-06
Attending: ORTHOPAEDIC SURGERY
Payer: MEDICARE

## 2023-03-06 DIAGNOSIS — M43.00 SPONDYLOLYSIS: ICD-10-CM

## 2023-03-06 DIAGNOSIS — Z01.818 PRE-OP TESTING: ICD-10-CM

## 2023-03-07 ENCOUNTER — ANESTHESIA EVENT (OUTPATIENT)
Dept: SURGERY | Facility: HOSPITAL | Age: 83
End: 2023-03-07
Payer: MEDICARE

## 2023-03-07 LAB — SARS-COV-2 RNA RESP QL NAA+PROBE: NOT DETECTED

## 2023-03-08 ENCOUNTER — ANESTHESIA (OUTPATIENT)
Dept: SURGERY | Facility: HOSPITAL | Age: 83
End: 2023-03-08
Payer: MEDICARE

## 2023-03-08 ENCOUNTER — HOSPITAL ENCOUNTER (INPATIENT)
Facility: HOSPITAL | Age: 83
LOS: 2 days | Discharge: NURSING, REHAB OR LONG-TERM CARE | End: 2023-03-10
Attending: ORTHOPAEDIC SURGERY | Admitting: ORTHOPAEDIC SURGERY
Payer: MEDICARE

## 2023-03-08 ENCOUNTER — APPOINTMENT (OUTPATIENT)
Dept: GENERAL RADIOLOGY | Facility: HOSPITAL | Age: 83
End: 2023-03-08
Attending: ORTHOPAEDIC SURGERY
Payer: MEDICARE

## 2023-03-08 DIAGNOSIS — M43.00 SPONDYLOLYSIS: Primary | ICD-10-CM

## 2023-03-08 LAB
CHLORIDE SERPL-SCNC: 109 MMOL/L (ref 98–112)
CO2 SERPL-SCNC: 26 MMOL/L (ref 21–32)
POTASSIUM SERPL-SCNC: 3.7 MMOL/L (ref 3.5–5.1)
SODIUM SERPL-SCNC: 141 MMOL/L (ref 136–145)

## 2023-03-08 PROCEDURE — 99223 1ST HOSP IP/OBS HIGH 75: CPT | Performed by: INTERNAL MEDICINE

## 2023-03-08 PROCEDURE — 0SG3071 FUSION OF LUMBOSACRAL JOINT WITH AUTOLOGOUS TISSUE SUBSTITUTE, POSTERIOR APPROACH, POSTERIOR COLUMN, OPEN APPROACH: ICD-10-PCS | Performed by: ORTHOPAEDIC SURGERY

## 2023-03-08 PROCEDURE — 01NB0ZZ RELEASE LUMBAR NERVE, OPEN APPROACH: ICD-10-PCS | Performed by: ORTHOPAEDIC SURGERY

## 2023-03-08 PROCEDURE — 72020 X-RAY EXAM OF SPINE 1 VIEW: CPT | Performed by: ORTHOPAEDIC SURGERY

## 2023-03-08 PROCEDURE — 00NY0ZZ RELEASE LUMBAR SPINAL CORD, OPEN APPROACH: ICD-10-PCS | Performed by: ORTHOPAEDIC SURGERY

## 2023-03-08 PROCEDURE — 0SG1071 FUSION OF 2 OR MORE LUMBAR VERTEBRAL JOINTS WITH AUTOLOGOUS TISSUE SUBSTITUTE, POSTERIOR APPROACH, POSTERIOR COLUMN, OPEN APPROACH: ICD-10-PCS | Performed by: ORTHOPAEDIC SURGERY

## 2023-03-08 PROCEDURE — 0SB40ZZ EXCISION OF LUMBOSACRAL DISC, OPEN APPROACH: ICD-10-PCS | Performed by: ORTHOPAEDIC SURGERY

## 2023-03-08 PROCEDURE — 01NR0ZZ RELEASE SACRAL NERVE, OPEN APPROACH: ICD-10-PCS | Performed by: ORTHOPAEDIC SURGERY

## 2023-03-08 DEVICE — COLLAGEN DURAL REGENERATION MEMBRANE 1IN X 3IN (2.5CM X 7.5CM)
Type: IMPLANTABLE DEVICE | Site: BACK | Status: FUNCTIONAL
Brand: DURAMATRIX-ONLAY PLUS

## 2023-03-08 RX ORDER — ONDANSETRON 2 MG/ML
4 INJECTION INTRAMUSCULAR; INTRAVENOUS EVERY 6 HOURS PRN
Status: DISCONTINUED | OUTPATIENT
Start: 2023-03-08 | End: 2023-03-10

## 2023-03-08 RX ORDER — SODIUM CHLORIDE, SODIUM LACTATE, POTASSIUM CHLORIDE, CALCIUM CHLORIDE 600; 310; 30; 20 MG/100ML; MG/100ML; MG/100ML; MG/100ML
INJECTION, SOLUTION INTRAVENOUS CONTINUOUS
Status: DISCONTINUED | OUTPATIENT
Start: 2023-03-08 | End: 2023-03-08 | Stop reason: HOSPADM

## 2023-03-08 RX ORDER — SODIUM CHLORIDE 9 MG/ML
INJECTION, SOLUTION INTRAVENOUS CONTINUOUS
Status: DISCONTINUED | OUTPATIENT
Start: 2023-03-08 | End: 2023-03-10

## 2023-03-08 RX ORDER — TRANEXAMIC ACID 10 MG/ML
INJECTION, SOLUTION INTRAVENOUS AS NEEDED
Status: DISCONTINUED | OUTPATIENT
Start: 2023-03-08 | End: 2023-03-08 | Stop reason: SURG

## 2023-03-08 RX ORDER — HYDROMORPHONE HYDROCHLORIDE 1 MG/ML
0.2 INJECTION, SOLUTION INTRAMUSCULAR; INTRAVENOUS; SUBCUTANEOUS EVERY 5 MIN PRN
Status: DISCONTINUED | OUTPATIENT
Start: 2023-03-08 | End: 2023-03-08 | Stop reason: HOSPADM

## 2023-03-08 RX ORDER — OXYCODONE HYDROCHLORIDE 5 MG/1
2.5 TABLET ORAL EVERY 4 HOURS PRN
Status: DISCONTINUED | OUTPATIENT
Start: 2023-03-08 | End: 2023-03-09

## 2023-03-08 RX ORDER — METOCLOPRAMIDE HYDROCHLORIDE 5 MG/ML
10 INJECTION INTRAMUSCULAR; INTRAVENOUS EVERY 8 HOURS PRN
Status: DISCONTINUED | OUTPATIENT
Start: 2023-03-08 | End: 2023-03-10

## 2023-03-08 RX ORDER — OMEPRAZOLE 40 MG/1
40 CAPSULE, DELAYED RELEASE ORAL DAILY
COMMUNITY

## 2023-03-08 RX ORDER — ONDANSETRON 2 MG/ML
4 INJECTION INTRAMUSCULAR; INTRAVENOUS EVERY 6 HOURS PRN
Status: DISCONTINUED | OUTPATIENT
Start: 2023-03-08 | End: 2023-03-08 | Stop reason: HOSPADM

## 2023-03-08 RX ORDER — PHENYLEPHRINE HCL 10 MG/ML
VIAL (ML) INJECTION AS NEEDED
Status: DISCONTINUED | OUTPATIENT
Start: 2023-03-08 | End: 2023-03-08 | Stop reason: SURG

## 2023-03-08 RX ORDER — METOCLOPRAMIDE HYDROCHLORIDE 5 MG/ML
10 INJECTION INTRAMUSCULAR; INTRAVENOUS EVERY 8 HOURS PRN
Status: DISCONTINUED | OUTPATIENT
Start: 2023-03-08 | End: 2023-03-08 | Stop reason: HOSPADM

## 2023-03-08 RX ORDER — HYDROMORPHONE HYDROCHLORIDE 1 MG/ML
0.4 INJECTION, SOLUTION INTRAMUSCULAR; INTRAVENOUS; SUBCUTANEOUS EVERY 2 HOUR PRN
Status: DISCONTINUED | OUTPATIENT
Start: 2023-03-08 | End: 2023-03-10

## 2023-03-08 RX ORDER — NEOSTIGMINE METHYLSULFATE 1 MG/ML
INJECTION, SOLUTION INTRAVENOUS AS NEEDED
Status: DISCONTINUED | OUTPATIENT
Start: 2023-03-08 | End: 2023-03-08 | Stop reason: SURG

## 2023-03-08 RX ORDER — ACETAMINOPHEN 500 MG
1000 TABLET ORAL ONCE
Status: DISCONTINUED | OUTPATIENT
Start: 2023-03-08 | End: 2023-03-08 | Stop reason: HOSPADM

## 2023-03-08 RX ORDER — ACETAMINOPHEN 500 MG
1000 TABLET ORAL ONCE AS NEEDED
Status: DISCONTINUED | OUTPATIENT
Start: 2023-03-08 | End: 2023-03-08 | Stop reason: HOSPADM

## 2023-03-08 RX ORDER — HYDROMORPHONE HYDROCHLORIDE 1 MG/ML
0.2 INJECTION, SOLUTION INTRAMUSCULAR; INTRAVENOUS; SUBCUTANEOUS EVERY 2 HOUR PRN
Status: DISCONTINUED | OUTPATIENT
Start: 2023-03-08 | End: 2023-03-10

## 2023-03-08 RX ORDER — POLYETHYLENE GLYCOL 3350 17 G/17G
17 POWDER, FOR SOLUTION ORAL DAILY PRN
Status: DISCONTINUED | OUTPATIENT
Start: 2023-03-08 | End: 2023-03-10

## 2023-03-08 RX ORDER — SENNOSIDES 8.6 MG
17.2 TABLET ORAL NIGHTLY
Status: DISCONTINUED | OUTPATIENT
Start: 2023-03-08 | End: 2023-03-10

## 2023-03-08 RX ORDER — LIDOCAINE HYDROCHLORIDE 10 MG/ML
INJECTION, SOLUTION EPIDURAL; INFILTRATION; INTRACAUDAL; PERINEURAL AS NEEDED
Status: DISCONTINUED | OUTPATIENT
Start: 2023-03-08 | End: 2023-03-08 | Stop reason: SURG

## 2023-03-08 RX ORDER — SODIUM CHLORIDE, SODIUM LACTATE, POTASSIUM CHLORIDE, CALCIUM CHLORIDE 600; 310; 30; 20 MG/100ML; MG/100ML; MG/100ML; MG/100ML
INJECTION, SOLUTION INTRAVENOUS CONTINUOUS
Status: DISCONTINUED | OUTPATIENT
Start: 2023-03-08 | End: 2023-03-10

## 2023-03-08 RX ORDER — CEFAZOLIN SODIUM/WATER 2 G/20 ML
2 SYRINGE (ML) INTRAVENOUS ONCE
Status: COMPLETED | OUTPATIENT
Start: 2023-03-08 | End: 2023-03-08

## 2023-03-08 RX ORDER — HYDROMORPHONE HYDROCHLORIDE 1 MG/ML
INJECTION, SOLUTION INTRAMUSCULAR; INTRAVENOUS; SUBCUTANEOUS
Status: COMPLETED
Start: 2023-03-08 | End: 2023-03-08

## 2023-03-08 RX ORDER — PANTOPRAZOLE SODIUM 40 MG/1
40 TABLET, DELAYED RELEASE ORAL
Status: DISCONTINUED | OUTPATIENT
Start: 2023-03-08 | End: 2023-03-10

## 2023-03-08 RX ORDER — SODIUM PHOSPHATE, DIBASIC AND SODIUM PHOSPHATE, MONOBASIC 7; 19 G/133ML; G/133ML
1 ENEMA RECTAL ONCE AS NEEDED
Status: DISCONTINUED | OUTPATIENT
Start: 2023-03-08 | End: 2023-03-10

## 2023-03-08 RX ORDER — HYDROMORPHONE HYDROCHLORIDE 1 MG/ML
0.6 INJECTION, SOLUTION INTRAMUSCULAR; INTRAVENOUS; SUBCUTANEOUS EVERY 5 MIN PRN
Status: DISCONTINUED | OUTPATIENT
Start: 2023-03-08 | End: 2023-03-08 | Stop reason: HOSPADM

## 2023-03-08 RX ORDER — DIPHENHYDRAMINE HYDROCHLORIDE 50 MG/ML
25 INJECTION INTRAMUSCULAR; INTRAVENOUS EVERY 4 HOURS PRN
Status: DISCONTINUED | OUTPATIENT
Start: 2023-03-08 | End: 2023-03-10

## 2023-03-08 RX ORDER — TAMSULOSIN HYDROCHLORIDE 0.4 MG/1
0.4 CAPSULE ORAL
Status: DISCONTINUED | OUTPATIENT
Start: 2023-03-08 | End: 2023-03-10

## 2023-03-08 RX ORDER — METHOCARBAMOL 500 MG/1
1 TABLET, FILM COATED ORAL 3 TIMES DAILY
COMMUNITY
Start: 2023-02-28

## 2023-03-08 RX ORDER — ATORVASTATIN CALCIUM 40 MG/1
40 TABLET, FILM COATED ORAL NIGHTLY
Status: DISCONTINUED | OUTPATIENT
Start: 2023-03-08 | End: 2023-03-08

## 2023-03-08 RX ORDER — DEXAMETHASONE SODIUM PHOSPHATE 4 MG/ML
VIAL (ML) INJECTION AS NEEDED
Status: DISCONTINUED | OUTPATIENT
Start: 2023-03-08 | End: 2023-03-08 | Stop reason: SURG

## 2023-03-08 RX ORDER — HYDROCODONE BITARTRATE AND ACETAMINOPHEN 5; 325 MG/1; MG/1
1 TABLET ORAL ONCE AS NEEDED
Status: DISCONTINUED | OUTPATIENT
Start: 2023-03-08 | End: 2023-03-08 | Stop reason: HOSPADM

## 2023-03-08 RX ORDER — HYDROMORPHONE HYDROCHLORIDE 1 MG/ML
0.4 INJECTION, SOLUTION INTRAMUSCULAR; INTRAVENOUS; SUBCUTANEOUS EVERY 5 MIN PRN
Status: DISCONTINUED | OUTPATIENT
Start: 2023-03-08 | End: 2023-03-08 | Stop reason: HOSPADM

## 2023-03-08 RX ORDER — EPHEDRINE SULFATE 50 MG/ML
INJECTION INTRAVENOUS AS NEEDED
Status: DISCONTINUED | OUTPATIENT
Start: 2023-03-08 | End: 2023-03-08 | Stop reason: SURG

## 2023-03-08 RX ORDER — ACETAMINOPHEN 325 MG/1
650 TABLET ORAL EVERY 6 HOURS PRN
Status: DISCONTINUED | OUTPATIENT
Start: 2023-03-08 | End: 2023-03-10

## 2023-03-08 RX ORDER — CEFAZOLIN SODIUM/WATER 2 G/20 ML
2 SYRINGE (ML) INTRAVENOUS EVERY 8 HOURS
Status: COMPLETED | OUTPATIENT
Start: 2023-03-08 | End: 2023-03-08

## 2023-03-08 RX ORDER — BISACODYL 10 MG
10 SUPPOSITORY, RECTAL RECTAL
Status: DISCONTINUED | OUTPATIENT
Start: 2023-03-08 | End: 2023-03-10

## 2023-03-08 RX ORDER — GLYCOPYRROLATE 0.2 MG/ML
INJECTION, SOLUTION INTRAMUSCULAR; INTRAVENOUS AS NEEDED
Status: DISCONTINUED | OUTPATIENT
Start: 2023-03-08 | End: 2023-03-08 | Stop reason: SURG

## 2023-03-08 RX ORDER — METOPROLOL SUCCINATE 25 MG/1
12.5 TABLET, EXTENDED RELEASE ORAL 2 TIMES DAILY
COMMUNITY

## 2023-03-08 RX ORDER — NALOXONE HYDROCHLORIDE 0.4 MG/ML
80 INJECTION, SOLUTION INTRAMUSCULAR; INTRAVENOUS; SUBCUTANEOUS AS NEEDED
Status: DISCONTINUED | OUTPATIENT
Start: 2023-03-08 | End: 2023-03-08 | Stop reason: HOSPADM

## 2023-03-08 RX ORDER — TRAMADOL HYDROCHLORIDE 50 MG/1
50 TABLET ORAL
COMMUNITY
Start: 2023-03-01 | End: 2023-03-10

## 2023-03-08 RX ORDER — ATORVASTATIN CALCIUM 40 MG/1
40 TABLET, FILM COATED ORAL
Status: DISCONTINUED | OUTPATIENT
Start: 2023-03-08 | End: 2023-03-10

## 2023-03-08 RX ORDER — MIDAZOLAM HYDROCHLORIDE 1 MG/ML
1 INJECTION INTRAMUSCULAR; INTRAVENOUS EVERY 5 MIN PRN
Status: DISCONTINUED | OUTPATIENT
Start: 2023-03-08 | End: 2023-03-08 | Stop reason: HOSPADM

## 2023-03-08 RX ORDER — OXYCODONE HYDROCHLORIDE 5 MG/1
5 TABLET ORAL EVERY 4 HOURS PRN
Status: DISCONTINUED | OUTPATIENT
Start: 2023-03-08 | End: 2023-03-09

## 2023-03-08 RX ORDER — HYDROCODONE BITARTRATE AND ACETAMINOPHEN 5; 325 MG/1; MG/1
2 TABLET ORAL ONCE AS NEEDED
Status: DISCONTINUED | OUTPATIENT
Start: 2023-03-08 | End: 2023-03-08 | Stop reason: HOSPADM

## 2023-03-08 RX ORDER — ROCURONIUM BROMIDE 10 MG/ML
INJECTION, SOLUTION INTRAVENOUS AS NEEDED
Status: DISCONTINUED | OUTPATIENT
Start: 2023-03-08 | End: 2023-03-08 | Stop reason: SURG

## 2023-03-08 RX ORDER — VANCOMYCIN HYDROCHLORIDE 1 G/20ML
INJECTION, POWDER, LYOPHILIZED, FOR SOLUTION INTRAVENOUS AS NEEDED
Status: DISCONTINUED | OUTPATIENT
Start: 2023-03-08 | End: 2023-03-08 | Stop reason: HOSPADM

## 2023-03-08 RX ORDER — DOCUSATE SODIUM 100 MG/1
100 CAPSULE, LIQUID FILLED ORAL 2 TIMES DAILY
Status: DISCONTINUED | OUTPATIENT
Start: 2023-03-08 | End: 2023-03-10

## 2023-03-08 RX ORDER — MAGNESIUM SULFATE HEPTAHYDRATE 500 MG/ML
INJECTION, SOLUTION INTRAMUSCULAR; INTRAVENOUS AS NEEDED
Status: DISCONTINUED | OUTPATIENT
Start: 2023-03-08 | End: 2023-03-08 | Stop reason: SURG

## 2023-03-08 RX ORDER — TAMSULOSIN HYDROCHLORIDE 0.4 MG/1
0.4 CAPSULE ORAL
COMMUNITY

## 2023-03-08 RX ORDER — ONDANSETRON 2 MG/ML
4 INJECTION INTRAMUSCULAR; INTRAVENOUS ONCE
Status: COMPLETED | OUTPATIENT
Start: 2023-03-08 | End: 2023-03-08

## 2023-03-08 RX ORDER — DIPHENHYDRAMINE HCL 25 MG
25 CAPSULE ORAL EVERY 4 HOURS PRN
Status: DISCONTINUED | OUTPATIENT
Start: 2023-03-08 | End: 2023-03-10

## 2023-03-08 RX ORDER — ONDANSETRON 2 MG/ML
INJECTION INTRAMUSCULAR; INTRAVENOUS AS NEEDED
Status: DISCONTINUED | OUTPATIENT
Start: 2023-03-08 | End: 2023-03-08

## 2023-03-08 RX ORDER — DIPHENHYDRAMINE HYDROCHLORIDE 50 MG/ML
12.5 INJECTION INTRAMUSCULAR; INTRAVENOUS AS NEEDED
Status: DISCONTINUED | OUTPATIENT
Start: 2023-03-08 | End: 2023-03-08 | Stop reason: HOSPADM

## 2023-03-08 RX ADMIN — SODIUM CHLORIDE, SODIUM LACTATE, POTASSIUM CHLORIDE, CALCIUM CHLORIDE: 600; 310; 30; 20 INJECTION, SOLUTION INTRAVENOUS at 09:30:00

## 2023-03-08 RX ADMIN — PHENYLEPHRINE HCL 100 MCG: 10 MG/ML VIAL (ML) INJECTION at 08:53:00

## 2023-03-08 RX ADMIN — EPHEDRINE SULFATE 10 MG: 50 INJECTION INTRAVENOUS at 08:31:00

## 2023-03-08 RX ADMIN — EPHEDRINE SULFATE 10 MG: 50 INJECTION INTRAVENOUS at 08:01:00

## 2023-03-08 RX ADMIN — EPHEDRINE SULFATE 10 MG: 50 INJECTION INTRAVENOUS at 08:35:00

## 2023-03-08 RX ADMIN — PHENYLEPHRINE HCL 200 MCG: 10 MG/ML VIAL (ML) INJECTION at 08:28:00

## 2023-03-08 RX ADMIN — EPHEDRINE SULFATE 10 MG: 50 INJECTION INTRAVENOUS at 07:37:00

## 2023-03-08 RX ADMIN — EPHEDRINE SULFATE 10 MG: 50 INJECTION INTRAVENOUS at 08:33:00

## 2023-03-08 RX ADMIN — PHENYLEPHRINE HCL 100 MCG: 10 MG/ML VIAL (ML) INJECTION at 08:22:00

## 2023-03-08 RX ADMIN — PHENYLEPHRINE HCL 100 MCG: 10 MG/ML VIAL (ML) INJECTION at 08:17:00

## 2023-03-08 RX ADMIN — LIDOCAINE HYDROCHLORIDE 5 ML: 10 INJECTION, SOLUTION EPIDURAL; INFILTRATION; INTRACAUDAL; PERINEURAL at 07:37:00

## 2023-03-08 RX ADMIN — EPHEDRINE SULFATE 10 MG: 50 INJECTION INTRAVENOUS at 08:04:00

## 2023-03-08 RX ADMIN — ROCURONIUM BROMIDE 30 MG: 10 INJECTION, SOLUTION INTRAVENOUS at 07:37:00

## 2023-03-08 RX ADMIN — NEOSTIGMINE METHYLSULFATE 3 MG: 1 INJECTION, SOLUTION INTRAVENOUS at 09:24:00

## 2023-03-08 RX ADMIN — EPHEDRINE SULFATE 10 MG: 50 INJECTION INTRAVENOUS at 07:42:00

## 2023-03-08 RX ADMIN — TRANEXAMIC ACID 1000 MG: 10 INJECTION, SOLUTION INTRAVENOUS at 08:00:00

## 2023-03-08 RX ADMIN — PHENYLEPHRINE HCL 100 MCG: 10 MG/ML VIAL (ML) INJECTION at 08:26:00

## 2023-03-08 RX ADMIN — EPHEDRINE SULFATE 10 MG: 50 INJECTION INTRAVENOUS at 09:01:00

## 2023-03-08 RX ADMIN — DEXAMETHASONE SODIUM PHOSPHATE 4 MG: 4 MG/ML VIAL (ML) INJECTION at 08:10:00

## 2023-03-08 RX ADMIN — ONDANSETRON 4 MG: 2 INJECTION INTRAMUSCULAR; INTRAVENOUS at 08:17:00

## 2023-03-08 RX ADMIN — CEFAZOLIN SODIUM/WATER 2 G: 2 G/20 ML SYRINGE (ML) INTRAVENOUS at 08:00:00

## 2023-03-08 RX ADMIN — SODIUM CHLORIDE, SODIUM LACTATE, POTASSIUM CHLORIDE, CALCIUM CHLORIDE: 600; 310; 30; 20 INJECTION, SOLUTION INTRAVENOUS at 09:16:00

## 2023-03-08 RX ADMIN — PHENYLEPHRINE HCL 100 MCG: 10 MG/ML VIAL (ML) INJECTION at 08:35:00

## 2023-03-08 RX ADMIN — EPHEDRINE SULFATE 10 MG: 50 INJECTION INTRAVENOUS at 08:53:00

## 2023-03-08 RX ADMIN — PHENYLEPHRINE HCL 100 MCG: 10 MG/ML VIAL (ML) INJECTION at 08:31:00

## 2023-03-08 RX ADMIN — EPHEDRINE SULFATE 10 MG: 50 INJECTION INTRAVENOUS at 08:29:00

## 2023-03-08 RX ADMIN — GLYCOPYRROLATE 0.4 MG: 0.2 INJECTION, SOLUTION INTRAMUSCULAR; INTRAVENOUS at 09:24:00

## 2023-03-08 RX ADMIN — MAGNESIUM SULFATE HEPTAHYDRATE 1 G: 500 INJECTION, SOLUTION INTRAMUSCULAR; INTRAVENOUS at 08:30:00

## 2023-03-08 RX ADMIN — EPHEDRINE SULFATE 10 MG: 50 INJECTION INTRAVENOUS at 08:26:00

## 2023-03-08 RX ADMIN — PHENYLEPHRINE HCL 100 MCG: 10 MG/ML VIAL (ML) INJECTION at 09:23:00

## 2023-03-08 RX ADMIN — PHENYLEPHRINE HCL 100 MCG: 10 MG/ML VIAL (ML) INJECTION at 09:01:00

## 2023-03-08 RX ADMIN — SODIUM CHLORIDE, SODIUM LACTATE, POTASSIUM CHLORIDE, CALCIUM CHLORIDE: 600; 310; 30; 20 INJECTION, SOLUTION INTRAVENOUS at 07:31:00

## 2023-03-08 NOTE — INTERVAL H&P NOTE
Pre-op Diagnosis: L3-S1 STENOSIS, SPONDYLOLISTHESIS    The above referenced H&P was reviewed by Abdi Beth MD on 3/8/2023, the patient was examined and no significant changes have occurred in the patient's condition since the H&P was performed. I discussed with the patient and/or legal representative the potential benefits, risks and side effects of this procedure; the likelihood of the patient achieving goals; and potential problems that might occur during recuperation. I discussed reasonable alternatives to the procedure, including risks, benefits and side effects related to the alternatives and risks related to not receiving this procedure. We will proceed with procedure as planned.

## 2023-03-08 NOTE — BRIEF OP NOTE
Pre-Operative Diagnosis: L3-S1 STENOSIS, SPONDYLOLISTHESIS     Post-Operative Diagnosis: L3-S1 STENOSIS, SPONDYLOLISTHESIS      Procedure Performed:   LUMBAR 3-LUMBAR 4, LUMBAR 4-LUMBAR 5, LUMBAR 5-SACRAL 1 DECOMPRESSION FUSION WITHOUT HARDWARE    Surgeon(s) and Role:     * Cecy Azar MD - Primary    Assistant(s):  PA: Domenico Caceres     Surgical Findings: above     Specimen: L5-S1 disc     Estimated Blood Loss: Blood Output: 50 mL (3/8/2023  9:05 AM)      Dictation Number:      CORINA Sanchez  3/8/2023  9:39 AM

## 2023-03-08 NOTE — PROGRESS NOTES
03/08/23 1504   Clinical Encounter Type   Visited With Patient   Routine Visit Introduction   Taxonomy   Intended Effects Promote a sense of peace   Methods Offer support; Offer spiritual/Adventism support; Offer emotional support;Explore spiritual/Adventism beliefs   Interventions Acknowledge current situation; Active listening; Ask guided questions; Explain  role;[place];Discuss concerns; Share words of hope and inspiration      responded to consult request for spiritual care visit.  provided care through active listening and prayer.  remain remains available for ongoing support. Spiritual Care support can be requested via an Epic consult. FILIBERTO Nolen. Div  Extension:[phone]

## 2023-03-08 NOTE — ANESTHESIA PROCEDURE NOTES
Airway  Date/Time: 3/8/2023 7:39 AM  Urgency: elective    Airway not difficult    General Information and Staff    Patient location during procedure: OR  Anesthesiologist: Jenn Michel MD  Performed: anesthesiologist   Performed by: Jenn Michel MD  Authorized by: Jenn Michel MD      Indications and Patient Condition  Indications for airway management: anesthesia  Spontaneous Ventilation: absent  Sedation level: deep  Preoxygenated: yes  Patient position: sniffing  Mask difficulty assessment: 1 - vent by mask    Final Airway Details  Final airway type: endotracheal airway      Successful airway: ETT  Cuffed: yes   Successful intubation technique: direct laryngoscopy  Facilitating devices/methods: intubating stylet  Endotracheal tube insertion site: oral  Blade: Tereza  Blade size: #4  ETT size (mm): 7.5    Cormack-Lehane Classification: grade I - full view of glottis  Placement verified by: chest auscultation and capnometry   Measured from: lips  ETT to lips (cm): 23  Number of attempts at approach: 1

## 2023-03-09 LAB
HCT VFR BLD AUTO: 38 %
HGB BLD-MCNC: 12.6 G/DL

## 2023-03-09 PROCEDURE — 99232 SBSQ HOSP IP/OBS MODERATE 35: CPT | Performed by: INTERNAL MEDICINE

## 2023-03-09 RX ORDER — TRAMADOL HYDROCHLORIDE 50 MG/1
50 TABLET ORAL EVERY 6 HOURS PRN
Status: DISCONTINUED | OUTPATIENT
Start: 2023-03-09 | End: 2023-03-10

## 2023-03-09 RX ORDER — TRAMADOL HYDROCHLORIDE 50 MG/1
100 TABLET ORAL EVERY 6 HOURS PRN
Status: DISCONTINUED | OUTPATIENT
Start: 2023-03-09 | End: 2023-03-10

## 2023-03-09 NOTE — PLAN OF CARE
Patient A/O x4, VSS on RA, c/o mild pain. Tylenol and robaxin PRN. , TEDs, SCDs. Voiding freely via urinal/bathroom, last BM 3/7. Hemovac drain in place. Plan for PT/OT eval tomorrow. Safety measures in place. Patient states he took his home meds that he had at the bedside. Educated on importance of not taking own meds while in the hospital. Per pt, meds were taken home by brother and no longer at the bedside.

## 2023-03-09 NOTE — PLAN OF CARE
Patient is alert and oriented x4.    RA,SCD's, , IS. Tolerating general diet. Dressing to lower back C/D/I. Ice therapy. Accordion drain to bulb suction, output 90 ml @ 1700  Pain managed by oral medication. Voiding, last BM 3/7.     Plan : discharge to [facility]

## 2023-03-09 NOTE — PLAN OF CARE
Patient POD 1 lumbar fusion. Pain controlled on tylenol, dressing dry, intact, vitals stable. Problem: PAIN - ADULT  Goal: Verbalizes/displays adequate comfort level or patient's stated pain goal  Description: INTERVENTIONS:  - Encourage pt to monitor pain and request assistance  - Assess pain using appropriate pain scale  - Administer analgesics based on type and severity of pain and evaluate response  - Implement non-pharmacological measures as appropriate and evaluate response  - Consider cultural and social influences on pain and pain management  - Manage/alleviate anxiety  - Utilize distraction and/or relaxation techniques  - Monitor for opioid side effects  - Notify MD/LIP if interventions unsuccessful or patient reports new pain  - Anticipate increased pain with activity and pre-medicate as appropriate  Outcome: Progressing     Problem: SAFETY ADULT - FALL  Goal: Free from fall injury  Description: INTERVENTIONS:  - Assess pt frequently for physical needs  - Identify cognitive and physical deficits and behaviors that affect risk of falls.   - [place] fall precautions as indicated by assessment.  - Educate pt/family on patient safety including physical limitations  - Instruct pt to call for assistance with activity based on assessment  - Modify environment to reduce risk of injury  - Provide assistive devices as appropriate  - Consider OT/PT consult to assist with strengthening/mobility  - Encourage toileting schedule  Outcome: Progressing     Problem: Patient/Family Goals  Goal: Patient/Family Long Term Goal  Description: Patient's Long Term Goal: Patient will return to activities of daily living and walk without pain    Interventions:  - surgery, therapy and pain meds  - See additional Care Plan goals for specific interventions  Outcome: Progressing  Goal: Patient/Family Short Term Goal  Description: Patient's Short Term Goal: pain control, walk, go home tomorrow    Interventions:   - Pain meds, discharge instructions  - See additional Care Plan goals for specific interventions  Outcome: Progressing

## 2023-03-09 NOTE — CM/SW NOTE
03/09/23 1000   CM/SW Referral Data   Referral Source Social Work (self-referral)   Reason for Referral Discharge planning   Informant Patient;Sibling;EMR;Clinical Staff Member   Patient Info   Patient's Current Mental Status at Time of Assessment Alert;Oriented   Patient lives with Alone   Discharge Needs   Anticipated D/C needs Subacute rehab;Transportation services   Services Requested   PASRR Level 1 Submitted Yes   Choice of Post-Acute Provider   Informed patient of right to choose their preferred provider Yes   List of appropriate post-acute services provided to patient/family with quality data Yes   Patient/family choice [address] given to Patient         Patient is an 81 y/o man admitted s/p lumbar decompression/fusion. SW received call pre-operative from pt's PCP, Dr Steve James who recommends [facility]EN at [facility]. Referral sent to [facility] via [facility] and confirmation received that pt can be accepted. PASRR completed and added to referral.  PT/OT evals pending. Met with pt and pt's, brother, Lorena Currie at bedside to discuss DC planning. Pt confirmed preference for [facility] at DC. Pt also requesting medicar transport and aware of costs not covered by insurance. [facility] information given for [facility]. Pt's brother would like to be contacted once DC time is known: [phone]. / to remain available for support and/or discharge planning.      [facility]  V:[phone]   [address]  Discharge Planner  [phone]

## 2023-03-09 NOTE — PLAN OF CARE
Patient is alert and oriented x4,    RA,, SCD's, IS, TEDS. Voiding. Last BM 3/7. Accordion drain, 90 ml output @ 1700. Dressing C/D/I, 4x4, tegaderm. Gel ice applied.     Plan: discharge to [facility], then back to home

## 2023-03-10 VITALS
RESPIRATION RATE: 18 BRPM | DIASTOLIC BLOOD PRESSURE: 73 MMHG | HEIGHT: 67 IN | HEART RATE: 84 BPM | TEMPERATURE: 99 F | SYSTOLIC BLOOD PRESSURE: 145 MMHG | WEIGHT: 150 LBS | BODY MASS INDEX: 23.54 KG/M2 | OXYGEN SATURATION: 94 %

## 2023-03-10 LAB
HBV SURFACE AG SER-ACNC: <0.1 [IU]/L
HBV SURFACE AG SERPL QL IA: NONREACTIVE
HCV AB SERPL QL IA: NONREACTIVE
HIV 1+2 AB+HIV1 P24 AG SERPL QL IA: NONREACTIVE

## 2023-03-10 PROCEDURE — 99232 SBSQ HOSP IP/OBS MODERATE 35: CPT | Performed by: STUDENT IN AN ORGANIZED HEALTH CARE EDUCATION/TRAINING PROGRAM

## 2023-03-10 RX ORDER — ASPIRIN 81 MG/1
81 TABLET ORAL EVERY MORNING
Qty: 30 TABLET | Refills: 0 | Status: SHIPPED | OUTPATIENT
Start: 2023-03-13

## 2023-03-10 NOTE — PLAN OF CARE
Patient awaiting discharge. Vitals stable, pain controlled. Dressing dry and intact. Dry, intact. Problem: PAIN - ADULT  Goal: Verbalizes/displays adequate comfort level or patient's stated pain goal  Description: INTERVENTIONS:  - Encourage pt to monitor pain and request assistance  - Assess pain using appropriate pain scale  - Administer analgesics based on type and severity of pain and evaluate response  - Implement non-pharmacological measures as appropriate and evaluate response  - Consider cultural and social influences on pain and pain management  - Manage/alleviate anxiety  - Utilize distraction and/or relaxation techniques  - Monitor for opioid side effects  - Notify MD/LIP if interventions unsuccessful or patient reports new pain  - Anticipate increased pain with activity and pre-medicate as appropriate  Outcome: Progressing     Problem: SAFETY ADULT - FALL  Goal: Free from fall injury  Description: INTERVENTIONS:  - Assess pt frequently for physical needs  - Identify cognitive and physical deficits and behaviors that affect risk of falls.   - [place] fall precautions as indicated by assessment.  - Educate pt/family on patient safety including physical limitations  - Instruct pt to call for assistance with activity based on assessment  - Modify environment to reduce risk of injury  - Provide assistive devices as appropriate  - Consider OT/PT consult to assist with strengthening/mobility  - Encourage toileting schedule  Outcome: Progressing     Problem: Patient/Family Goals  Goal: Patient/Family Long Term Goal  Description: Patient's Long Term Goal: Patient will return to activities of daily living and walk without pain    Interventions:  - surgery, therapy and pain meds  - See additional Care Plan goals for specific interventions  Outcome: Progressing  Goal: Patient/Family Short Term Goal  Description: Patient's Short Term Goal: pain control, walk, go home tomorrow    Interventions:   - Pain meds, discharge instructions  - See additional Care Plan goals for specific interventions  Outcome: Progressing

## 2023-03-10 NOTE — CM/SW NOTE
03/10/23 0900   Discharge disposition   Expected discharge disposition [facility] Provider [facility]   Discharge transportation [facility]         Pt cleared for dc today to [facility]. [facility] being arranged for 2pm.  RN to call report to [phone]. Sw left message for pt's brother re; dc plans.     Willy Wright LCSW  /Discharge Planner

## 2023-03-10 NOTE — PROGRESS NOTES
[facility] Report called in, discharge instructions given. followup appts reviewed. All questions answered.

## 2023-03-10 NOTE — PLAN OF CARE
Patient A/O x4, VSS on RA, c/o mild pain. Tylenol and robaxin PRN. , TEDs, SCDs. Voiding freely via urinal/bathroom, last BM 3/8. Hemovac drain in place. Plan for dc to [address] [place] [address] drain output. Safety measures in place.

## 2023-03-10 NOTE — OPERATIVE REPORT
[facility]    PATIENT'S NAME: Aris Villasenor   ATTENDING PHYSICIAN: Colt Newsome M.D. OPERATING PHYSICIAN: Colt Newsome M.D. PATIENT ACCOUNT#:   [de-identified]    LOCATION:  [address] [place] [address]  MEDICAL RECORD #:   [id number]       YOB: 1940  ADMISSION DATE:       03/08/2023      OPERATION DATE:  03/08/2023    OPERATIVE REPORT    PREOPERATIVE DIAGNOSIS:    1. L3-S1 stenosis, spondylolisthesis. 2. L5-S1 disc herniation. POSTOPERATIVE DIAGNOSIS:    1. L3-S1 stenosis, spondylolisthesis. 2. L5-S1 disc herniation. PROCEDURE:    1. L3-4, L4-5, L5-S1 decompression. 2. L5-S1 discectomy with uninstrumented fusion. INDICATIONS: The patient had failed reasonable conservative measures which are outlined in the patient's clinic medical record. Physical therapy, medical management, injections, alternative treatment are among those offered unless motor deficits are progressive. Indications for surgery are based on scientific literature and MEET guidelines. A thorough meeting with the patient and at least one key caregiver was had. The risks and benefits were discussed in significant detail. The risks include, but are not limited to, bleeding, infection, spinal leaks, nerve injuries, recurrent disc herniation, lumbar instability, and need for further surgery. I have gone over the operation using models, diagrams, and the patient's own imaging studies. Additional written and digital sources were provided. No guarantees were made. ASSISTANT:  CORINA Powers. A skilled and experienced assistant was required for the entire surgical procedure. As a lumbar spinal reconstruction, the procedure is done in immediate proximity to critical neural elements and is done in close proximity to the critical vascular and visceral structures. Much of the surgery is done in and around the cauda equine and its exiting nerves.   Any assistance, including, but not limited to, retraction, suction, and other means of facilitation of the procedure requires an individual with substantial postgraduate training and substantial spinal surgical experience. ESTIMATED BLOOD LOSS:  50 mL. SPECIMENS:  L5-S1 disc. DESCRIPTION OF PROCEDURE:  After obtaining informed consent, the patient was taken to the operating room. SCDs and FLORINDA hose were applied. Preoperative antibiotics were delivered. The patient was placed in the prone position on a Francisco frame. All bony prominences were padded. Incision was marked and locally prepped. A spinal needle was applied. X-ray confirmed appropriate localization of our incision. The needle was removed, and the back was sterilely prepped and draped. A longitudinal incision was made with a #10 blade. Bovie electrocautery was used for hemostasis. Dissection was taken down to the level of the fascia. Subperiosteal dissection was taken at the level of the facet joints but keeping the facet capsules entirely intact. Self-retaining retractors were applied. We first began at the proximal level of our decompression and removed portions of the interspinous ligament and spinous processes. This was done at all indicated levels. Curettes were used to gain access to the canal.  Banuelos ball was used to dissect ligamentum flavum away and free from the neural elements. Then 4 and 5-mm Kerrisons were used to resect thickened ligamentum flavum and central canal stenosis of the lamina. Attention then turned to the L3-4 level where the cranial and caudal nerve roots were both explored. This was done by elevating a plane with the Wynelle Mixer ball and curettes between the ligamentum flavum and the dura. With the dura dissected free and downward, we identified the plane and then removed this with 3, 4, and 5 mm Kerrisons. Attention then turned to the foraminotomies, the cranial and caudal nerve roots at this level.   Contralateral decompression was undertaken thus creating a bilateral decompression and bilateral microforaminotomy and hemilaminotomy using undercutting technique. An undercutting facet sparing hemilaminectomy was also completed. With undercutting technique and a Banuelos ball in place, we excised ligamentum flavum through the bone at the cranial foramina as needed, facilitating a serial cranial decompression and undercutting microforaminotomy and hemilaminotomy versus undercutting hemilaminectomy. This was done in an undercutting fashion as well and done to examine the central and contralateral neural elements facilitating central bilateral decompression as well. This was done using undercutting technique up to the level of the pedicle, with an extraforaminal area also being decompressed with the undercutting technique. At the conclusion of the decompression, all areas were free of neurologic compression. Attention then turned to the L4-5 level where the cranial and caudal nerve roots were both explored. This was done by elevating a plane with the [facility] ball and curettes between the ligamentum flavum and the dura. With the dura dissected free and downward, we identified the plane and then removed this with 3, 4, and 5 mm Kerrisons. Attention then turned to the foraminotomies, the cranial and caudal nerve roots at this level. Contralateral decompression was undertaken thus creating a bilateral decompression and bilateral microforaminotomy and hemilaminotomy using undercutting technique. An undercutting facet sparing hemilaminectomy was also completed. With undercutting technique and a Banuelos ball in place, we excised ligamentum flavum through the bone at the cranial foramina as needed, facilitating a serial cranial decompression and undercutting microforaminotomy and hemilaminotomy versus undercutting hemilaminectomy.   This was done in an undercutting fashion as well and done to examine the central and contralateral neural elements facilitating central bilateral decompression as well. This was done using undercutting technique up to the level of the pedicle, with an extraforaminal area also being decompressed with the undercutting technique. At the conclusion of the decompression, all areas were free of neurologic compression. Attention then turned to the L5-S1 level where the cranial and caudal nerve roots were both explored. This was done by elevating a plane with the [facility] ball and curettes between the ligamentum flavum and the dura. With the dura dissected free and downward, we identified the plane and then removed this with 3, 4, and 5 mm Kerrisons. Attention then turned to the foraminotomies, the cranial and caudal nerve roots at this level. Contralateral decompression was undertaken thus creating a bilateral decompression and bilateral microforaminotomy and hemilaminotomy using undercutting technique. An undercutting facet sparing hemilaminectomy was also completed. With undercutting technique and a Banuelos ball in place, we excised ligamentum flavum through the bone at the cranial foramina as needed, facilitating a serial cranial decompression and undercutting microforaminotomy and hemilaminotomy versus undercutting hemilaminectomy. This was done in an undercutting fashion as well and done to examine the central and contralateral neural elements facilitating central bilateral decompression as well. This was done using undercutting technique up to the level of the pedicle, with an extraforaminal area also being decompressed with the undercutting technique. The nerve at L5-S1 on the right was mobilized. There was a conjoined nerve more distally, and through the axilla we were able to identify disc herniation and remove it with a Banuelos ball. At the conclusion of the decompression, all areas were free of neurologic compression. After assessing the complete decompression, we evaluated the facets.   An excess of 50% of the facets bilaterally were removed, thus increasing the likelihood of subsequent instability. We then elected to proceed with the posterolateral fusion at the L3-4, L4-5, L5-S1 levels. Posterior elements were decorticated. Local bone was applied. Final x-ray was taken to confirm appropriate levels were addressed. The wound was thoroughly irrigated. Hemostasis was maintained. FloSeal was applied to help with hemostasis and then was irrigated away. Valsalva maneuver confirmed that there was no spinal leak. Balinda Emely was applied, subfascial drain was applied. The fascia was closed in water-tight fashion with figure-of-eight 1-0 Vicryl; 2-0 Vicryl was used for the subcutaneous tissues and running 3-0 subcuticular stitches were applied. Steri-Strips were applied. Sterile dressings were applied. The patient was extubated and taken to the recovery room in stable condition and was neurologically intact at its baseline and stable on arrival.  Needle and Ray-Tamara counts were correct at the conclusion of the case.     Dictated By Wade Alberto M.D.  d: 03/09/2023 08:10:51  t: 03/09/2023 09:44:50  [facility] [id number]  PRP/

## 2023-04-06 ENCOUNTER — TELEPHONE (OUTPATIENT)
Dept: INTERNAL MEDICINE CLINIC | Facility: CLINIC | Age: 83
End: 2023-04-06

## 2023-04-06 ENCOUNTER — OFFICE VISIT (OUTPATIENT)
Dept: INTERNAL MEDICINE CLINIC | Facility: CLINIC | Age: 83
End: 2023-04-06
Payer: MEDICARE

## 2023-04-06 ENCOUNTER — HOSPITAL ENCOUNTER (OUTPATIENT)
Dept: ULTRASOUND IMAGING | Facility: HOSPITAL | Age: 83
Discharge: HOME | End: 2023-04-06
Attending: INTERNAL MEDICINE
Payer: MEDICARE

## 2023-04-06 VITALS
BODY MASS INDEX: 23.39 KG/M2 | HEIGHT: 67 IN | DIASTOLIC BLOOD PRESSURE: 60 MMHG | OXYGEN SATURATION: 98 % | RESPIRATION RATE: 16 BRPM | SYSTOLIC BLOOD PRESSURE: 130 MMHG | HEART RATE: 70 BPM | TEMPERATURE: 98 F | WEIGHT: 149 LBS

## 2023-04-06 DIAGNOSIS — M48.061 SPINAL STENOSIS OF LUMBAR REGION WITHOUT NEUROGENIC CLAUDICATION: Primary | ICD-10-CM

## 2023-04-06 DIAGNOSIS — R60.0 BILATERAL LEG EDEMA: ICD-10-CM

## 2023-04-06 DIAGNOSIS — I10 ESSENTIAL HYPERTENSION: ICD-10-CM

## 2023-04-06 DIAGNOSIS — Z09 POSTOP CHECK: ICD-10-CM

## 2023-04-06 PROCEDURE — 1111F DSCHRG MED/CURRENT MED MERGE: CPT | Performed by: INTERNAL MEDICINE

## 2023-04-06 PROCEDURE — 93970 EXTREMITY STUDY: CPT | Performed by: INTERNAL MEDICINE

## 2023-04-06 PROCEDURE — 99214 OFFICE O/P EST MOD 30 MIN: CPT | Performed by: INTERNAL MEDICINE

## 2023-04-06 RX ORDER — TRIAMTERENE AND HYDROCHLOROTHIAZIDE 37.5; 25 MG/1; MG/1
1 CAPSULE ORAL EVERY MORNING
Qty: 90 CAPSULE | Refills: 0 | Status: CANCELLED | OUTPATIENT
Start: 2023-04-06

## 2023-04-06 RX ORDER — TRIAMTERENE AND HYDROCHLOROTHIAZIDE 37.5; 25 MG/1; MG/1
1 CAPSULE ORAL EVERY MORNING
Refills: 0 | Status: CANCELLED | OUTPATIENT
Start: 2023-04-06

## 2023-04-06 RX ORDER — FUROSEMIDE 20 MG/1
20 TABLET ORAL EVERY MORNING
Qty: 90 TABLET | Refills: 1 | Status: SHIPPED | OUTPATIENT
Start: 2023-04-06

## 2023-04-06 RX ORDER — OMEPRAZOLE 40 MG/1
40 CAPSULE, DELAYED RELEASE ORAL DAILY
Qty: 90 CAPSULE | Refills: 0 | Status: SHIPPED | OUTPATIENT
Start: 2023-04-06

## 2023-04-06 NOTE — TELEPHONE ENCOUNTER
Patient was seen today by Dr. Mandy Lacey and a prescription for a water pill was supposed to be called into the pharmacy.       [id number] - [address] [phone], [phone]

## 2023-04-23 ENCOUNTER — PATIENT MESSAGE (OUTPATIENT)
Dept: INTERNAL MEDICINE CLINIC | Facility: CLINIC | Age: 83
End: 2023-04-23

## 2023-04-25 DIAGNOSIS — M79.89 LEG SWELLING: Primary | ICD-10-CM

## 2023-04-25 RX ORDER — POTASSIUM CHLORIDE 750 MG/1
10 TABLET, FILM COATED, EXTENDED RELEASE ORAL DAILY
Qty: 30 TABLET | Refills: 3 | Status: SHIPPED | OUTPATIENT
Start: 2023-04-25

## 2023-04-26 ENCOUNTER — HOSPITAL ENCOUNTER (OUTPATIENT)
Dept: ULTRASOUND IMAGING | Age: 83
Discharge: HOME | End: 2023-04-26
Attending: INTERNAL MEDICINE
Payer: MEDICARE

## 2023-04-26 DIAGNOSIS — M79.89 LEG SWELLING: ICD-10-CM

## 2023-04-26 PROCEDURE — 93971 EXTREMITY STUDY: CPT | Performed by: INTERNAL MEDICINE

## 2023-05-18 NOTE — TELEPHONE ENCOUNTER
Vitamin d 44200 1 cap q14 days filled 1/2/23  Tamsulosin 0.4 mg 1 cap daily filled 1/26/23 90 with 0 refills     LOV 4/6/23  RTC 2mos  Next apt 5/19/23  Labs   8/29/22    PSA Screen  ng/mL 2.45      Vitamin D 42.9 on 8/29/22

## 2023-05-19 ENCOUNTER — OFFICE VISIT (OUTPATIENT)
Dept: INTERNAL MEDICINE CLINIC | Facility: CLINIC | Age: 83
End: 2023-05-19
Payer: MEDICARE

## 2023-05-19 VITALS
HEART RATE: 68 BPM | TEMPERATURE: 98 F | DIASTOLIC BLOOD PRESSURE: 68 MMHG | BODY MASS INDEX: 23 KG/M2 | RESPIRATION RATE: 16 BRPM | OXYGEN SATURATION: 99 % | WEIGHT: 149 LBS | SYSTOLIC BLOOD PRESSURE: 130 MMHG

## 2023-05-19 DIAGNOSIS — R60.0 PEDAL EDEMA: ICD-10-CM

## 2023-05-19 DIAGNOSIS — Z09 POSTOP CHECK: ICD-10-CM

## 2023-05-19 DIAGNOSIS — M48.061 SPINAL STENOSIS OF LUMBAR REGION WITHOUT NEUROGENIC CLAUDICATION: Primary | ICD-10-CM

## 2023-05-19 PROCEDURE — 99213 OFFICE O/P EST LOW 20 MIN: CPT | Performed by: INTERNAL MEDICINE

## 2023-05-19 RX ORDER — TAMSULOSIN HYDROCHLORIDE 0.4 MG/1
CAPSULE ORAL
Qty: 90 CAPSULE | Refills: 0 | Status: SHIPPED | OUTPATIENT
Start: 2023-05-19

## 2023-05-19 RX ORDER — POTASSIUM CHLORIDE 750 MG/1
10 TABLET, FILM COATED, EXTENDED RELEASE ORAL DAILY
Qty: 90 TABLET | Refills: 3 | Status: SHIPPED | OUTPATIENT
Start: 2023-05-19

## 2023-05-19 RX ORDER — FUROSEMIDE 40 MG/1
40 TABLET ORAL 2 TIMES DAILY
Qty: 90 TABLET | Refills: 3 | Status: SHIPPED | OUTPATIENT
Start: 2023-05-19 | End: 2023-05-22 | Stop reason: CLARIF

## 2023-05-19 RX ORDER — ERGOCALCIFEROL 1.25 MG/1
CAPSULE ORAL
Qty: 6 CAPSULE | Refills: 0 | Status: SHIPPED | OUTPATIENT
Start: 2023-05-19

## 2023-05-19 RX ORDER — FUROSEMIDE 40 MG/1
40 TABLET ORAL 2 TIMES DAILY
COMMUNITY
End: 2023-05-19

## 2023-07-17 RX ORDER — OMEPRAZOLE 40 MG/1
40 CAPSULE, DELAYED RELEASE ORAL DAILY
Qty: 90 CAPSULE | Refills: 0 | Status: SHIPPED | OUTPATIENT
Start: 2023-07-17

## 2023-07-28 ENCOUNTER — PATIENT MESSAGE (OUTPATIENT)
Dept: INTERNAL MEDICINE CLINIC | Facility: CLINIC | Age: 83
End: 2023-07-28

## 2023-07-28 RX ORDER — POTASSIUM CHLORIDE 750 MG/1
10 TABLET, FILM COATED, EXTENDED RELEASE ORAL DAILY
Qty: 90 TABLET | Refills: 3 | Status: SHIPPED | OUTPATIENT
Start: 2023-07-28

## 2023-08-18 ENCOUNTER — OFFICE VISIT (OUTPATIENT)
Facility: LOCATION | Age: 83
End: 2023-08-18
Payer: MEDICARE

## 2023-08-18 DIAGNOSIS — H61.23 CERUMEN DEBRIS ON TYMPANIC MEMBRANE OF BOTH EARS: Primary | ICD-10-CM

## 2023-08-18 PROCEDURE — 99212 OFFICE O/P EST SF 10 MIN: CPT | Performed by: OTOLARYNGOLOGY

## 2023-08-18 NOTE — PROGRESS NOTES
Justo Romero is a 80year old male. Patient presents with:  Cerumen Impaction    HPI:   He has wax impactions. He is to undergo an audiogram soon. REVIEW OF SYSTEMS:   GENERAL HEALTH: feels well otherwise  GENERAL : denies fever, chills, sweats, weight loss, weight gain  SKIN: denies any unusual skin lesions or rashes  RESPIRATORY: denies shortness of breath with exertion  NEURO: denies headaches    EXAM:   There were no vitals taken for this visit. System Findings Details   Constitutional  Overall appearance - Normal.   Psychiatric  Orientation - Oriented to time, place, person & situation. Appropriate mood and affect. Head/Face  Facial features -- Normal. Skull - Normal.   Eyes  Pupils equal ,round ,react to light and accomidate   Ears, Nose, Throat, Neck  Minimal amount of wax in the ears removed today on the microscope. No evidence of infection tympanic membrane's are clear. Neurological  Memory - Normal. Cranial nerves - Cranial nerves II through XII grossly intact. Lymph Detail  Submental. Submandibular. Anterior cervical. Posterior cervical. Supraclavicular. ASSESSMENT AND PLAN:   1. Cerumen debris on tympanic membrane of both ears  Removed today. He will be undergoing a physical and hearing evaluation soon      The patient indicates understanding of these issues and agrees to the plan. No follow-ups on file.     Keturah Bobby MD  8/18/2023  11:37 AM

## 2023-08-21 ENCOUNTER — HOSPITAL ENCOUNTER (OUTPATIENT)
Age: 83
Discharge: HOME | End: 2023-08-21
Payer: MEDICARE

## 2023-08-21 VITALS
SYSTOLIC BLOOD PRESSURE: 127 MMHG | HEIGHT: 67 IN | DIASTOLIC BLOOD PRESSURE: 69 MMHG | RESPIRATION RATE: 18 BRPM | WEIGHT: 147 LBS | OXYGEN SATURATION: 97 % | HEART RATE: 75 BPM | BODY MASS INDEX: 23.07 KG/M2 | TEMPERATURE: 98 F

## 2023-08-21 DIAGNOSIS — Z20.822 LAB TEST NEGATIVE FOR COVID-19 VIRUS: ICD-10-CM

## 2023-08-21 DIAGNOSIS — R68.89 FLU-LIKE SYMPTOMS: Primary | ICD-10-CM

## 2023-08-21 LAB — SARS-COV-2 RNA RESP QL NAA+PROBE: NOT DETECTED

## 2023-08-21 PROCEDURE — 99213 OFFICE O/P EST LOW 20 MIN: CPT | Performed by: PHYSICIAN ASSISTANT

## 2023-08-21 PROCEDURE — U0002 COVID-19 LAB TEST NON-CDC: HCPCS | Performed by: PHYSICIAN ASSISTANT

## 2023-08-21 NOTE — DISCHARGE INSTRUCTIONS
Please return to the ER/clinic if symptoms worsen. Follow-up with your PCP in 24-48 hours as needed. Push fluids. Take Tylenol for discomfort. Recommend taking over-the-counter antihistamine daily i.e. Zyrtec or Claritin. Alternate with heat and ice to help with the next discomfort. If symptoms persist or worsen i.e. increasing fever shortness of breath neck pain etc. go directly to the emergency room. Otherwise follow-up with your PCP for further evaluation and treatment.

## 2023-08-29 ENCOUNTER — NURSE ONLY (OUTPATIENT)
Dept: INTERNAL MEDICINE CLINIC | Facility: CLINIC | Age: 83
End: 2023-08-29
Payer: MEDICARE

## 2023-08-29 DIAGNOSIS — Z00.00 LABORATORY EXAMINATION ORDERED AS PART OF A ROUTINE GENERAL MEDICAL EXAMINATION: Primary | ICD-10-CM

## 2023-08-29 LAB
AMB EXT BILIRUBIN, TOTAL: 0.5 MG/DL
AMB EXT BUN: 18 MG/DL
AMB EXT CALCIUM: 9.8
AMB EXT CARBON DIOXIDE: 26
AMB EXT CHLORIDE: 99
AMB EXT CHOL/HDL RATIO: 2.5
AMB EXT CHOLESTEROL, TOTAL: 122 MG/DL
AMB EXT CMP ALT: 23 U/L (ref 9–46)
AMB EXT CMP AST: 28 U/L (ref 10–35)
AMB EXT CREATININE: 0.84 MG/DL
AMB EXT GLUCOSE: 92 MG/DL
AMB EXT HDL CHOLESTEROL: 49 MG/DL
AMB EXT HEMATOCRIT: 42.9 (ref 38.5–50)
AMB EXT HEMOGLOBIN: 14.2 (ref 13.2–17.1)
AMB EXT HGBA1C: 5.5 %
AMB EXT LDL CHOLESTEROL, DIRECT: 60 MG/DL
AMB EXT MCV: 94.1 (ref 80–100)
AMB EXT NON HDL CHOL: 73 MG/DL
AMB EXT PLATELETS: 257 (ref 140–400)
AMB EXT POSTASSIUM: 3.7 MMOL/L (ref 3.5–5.1)
AMB EXT PSA SCREEN: 3.85 NG/ML
AMB EXT SODIUM: 139 MMOL/L (ref 136–145)
AMB EXT TOTAL PROTEIN: 7 (ref 6.1–8)
AMB EXT TRIGLYCERIDES: 51 MG/DL
AMB EXT TSH: 3.28 MIU/ML
AMB EXT WBC: 6.2 X10(3)UL
BILIRUB UR QL STRIP.AUTO: NEGATIVE
CLARITY UR REFRACT.AUTO: CLEAR
COLOR UR AUTO: COLORLESS
GLUCOSE UR STRIP.AUTO-MCNC: NORMAL MG/DL
KETONES UR STRIP.AUTO-MCNC: NEGATIVE MG/DL
LEUKOCYTE ESTERASE UR QL STRIP.AUTO: NEGATIVE
NITRITE UR QL STRIP.AUTO: NEGATIVE
PH UR STRIP.AUTO: 7 [PH] (ref 5–8)
PROT UR STRIP.AUTO-MCNC: NEGATIVE MG/DL
RBC UR QL AUTO: NEGATIVE
SP GR UR STRIP.AUTO: <1.005 (ref 1–1.03)
UROBILINOGEN UR STRIP.AUTO-MCNC: NORMAL MG/DL

## 2023-08-29 PROCEDURE — 93923 UPR/LXTR ART STDY 3+ LVLS: CPT | Performed by: INTERNAL MEDICINE

## 2023-08-29 PROCEDURE — 92551 PURE TONE HEARING TEST AIR: CPT | Performed by: INTERNAL MEDICINE

## 2023-08-29 PROCEDURE — 81003 URINALYSIS AUTO W/O SCOPE: CPT | Performed by: INTERNAL MEDICINE

## 2023-09-18 ENCOUNTER — OFFICE VISIT (OUTPATIENT)
Dept: INTERNAL MEDICINE CLINIC | Facility: CLINIC | Age: 83
End: 2023-09-18
Payer: MEDICARE

## 2023-09-18 VITALS
WEIGHT: 145.13 LBS | HEIGHT: 67 IN | SYSTOLIC BLOOD PRESSURE: 126 MMHG | BODY MASS INDEX: 22.78 KG/M2 | DIASTOLIC BLOOD PRESSURE: 70 MMHG | HEART RATE: 76 BPM | TEMPERATURE: 98 F | OXYGEN SATURATION: 98 % | RESPIRATION RATE: 16 BRPM

## 2023-09-18 DIAGNOSIS — I70.0 ARTERIOSCLEROSIS OF THORACIC AORTA (HCC): ICD-10-CM

## 2023-09-18 DIAGNOSIS — R97.20 PSA ELEVATION: ICD-10-CM

## 2023-09-18 DIAGNOSIS — J44.9 CHRONIC OBSTRUCTIVE PULMONARY DISEASE, UNSPECIFIED COPD TYPE (HCC): ICD-10-CM

## 2023-09-18 DIAGNOSIS — E53.8 VITAMIN B12 DEFICIENCY: ICD-10-CM

## 2023-09-18 DIAGNOSIS — E78.5 DYSLIPIDEMIA: ICD-10-CM

## 2023-09-18 DIAGNOSIS — Z00.00 ANNUAL PHYSICAL EXAM: Primary | ICD-10-CM

## 2023-09-18 LAB
ATRIAL RATE: 54 BPM
P AXIS: 78 DEGREES
P-R INTERVAL: 180 MS
Q-T INTERVAL: 394 MS
QRS DURATION: 96 MS
QTC CALCULATION (BEZET): 373 MS
R AXIS: 68 DEGREES
T AXIS: 65 DEGREES
VENTRICULAR RATE: 54 BPM

## 2023-09-18 RX ORDER — OMEPRAZOLE 40 MG/1
40 CAPSULE, DELAYED RELEASE ORAL DAILY
Qty: 90 CAPSULE | Refills: 3 | Status: SHIPPED | OUTPATIENT
Start: 2023-09-18

## 2023-09-18 RX ORDER — AMLODIPINE BESYLATE 5 MG/1
5 TABLET ORAL 2 TIMES DAILY
COMMUNITY

## 2023-09-18 RX ORDER — POTASSIUM CHLORIDE 750 MG/1
10 TABLET, FILM COATED, EXTENDED RELEASE ORAL DAILY
Qty: 90 TABLET | Refills: 3 | Status: SHIPPED | OUTPATIENT
Start: 2023-09-18

## 2023-09-18 RX ORDER — ERGOCALCIFEROL 1.25 MG/1
50000 CAPSULE ORAL
Qty: 6 CAPSULE | Refills: 3 | Status: SHIPPED | OUTPATIENT
Start: 2023-09-18

## 2023-09-18 RX ORDER — TAMSULOSIN HYDROCHLORIDE 0.4 MG/1
0.4 CAPSULE ORAL DAILY
Qty: 90 CAPSULE | Refills: 3 | Status: SHIPPED | OUTPATIENT
Start: 2023-09-18

## 2023-09-18 RX ORDER — ATORVASTATIN CALCIUM 40 MG/1
40 TABLET, FILM COATED ORAL NIGHTLY
Qty: 90 TABLET | Refills: 3 | Status: SHIPPED | OUTPATIENT
Start: 2023-09-18

## 2023-09-18 RX ORDER — FUROSEMIDE 40 MG/1
40 TABLET ORAL DAILY
COMMUNITY
Start: 2023-08-09 | End: 2023-09-18

## 2023-09-18 RX ORDER — METOPROLOL SUCCINATE 25 MG/1
12.5 TABLET, EXTENDED RELEASE ORAL 2 TIMES DAILY
Qty: 90 TABLET | Refills: 3 | Status: SHIPPED | OUTPATIENT
Start: 2023-09-18

## 2023-09-18 RX ORDER — FUROSEMIDE 40 MG/1
40 TABLET ORAL DAILY
Qty: 90 TABLET | Refills: 3 | Status: SHIPPED | OUTPATIENT
Start: 2023-09-18

## 2023-09-19 ENCOUNTER — TELEPHONE (OUTPATIENT)
Dept: INTERNAL MEDICINE CLINIC | Facility: CLINIC | Age: 83
End: 2023-09-19

## 2023-09-19 NOTE — TELEPHONE ENCOUNTER
From: Sveta Cabrera  Sent: 9/19/2023 10:52 AM CDT  To: [facility] Clinical Staff  Subject: Follow up chest xrrigoberto    Ольга,   Will make the appointment. Will send a message to Dr Rafael Hung when I have it scheduled.      Thanks,   Keagan Oneil   5-

## 2023-09-21 ENCOUNTER — PATIENT MESSAGE (OUTPATIENT)
Dept: INTERNAL MEDICINE CLINIC | Facility: CLINIC | Age: 83
End: 2023-09-21

## 2023-09-21 NOTE — TELEPHONE ENCOUNTER
From: Kemi Kelly  To: Caty Erickson  Sent: 9/21/2023 10:22 AM CDT  Subject: 2024 Chest X-Ray schedule    Dr Marcelino Johnson,  Do to a conflict regarding a possible trip planned in early 2024 I have rescheduled the Chest X-Ray  for 1/10/24 at [facility].    Thanks,  Sarmad Shaw  7/10/1940

## 2023-10-10 RX ORDER — TAMSULOSIN HYDROCHLORIDE 0.4 MG/1
0.4 CAPSULE ORAL DAILY
Qty: 90 CAPSULE | Refills: 0 | Status: SHIPPED | OUTPATIENT
Start: 2023-10-10

## 2023-10-10 RX ORDER — OMEPRAZOLE 40 MG/1
40 CAPSULE, DELAYED RELEASE ORAL DAILY
Qty: 90 CAPSULE | Refills: 0 | Status: SHIPPED | OUTPATIENT
Start: 2023-10-10

## 2023-10-10 RX ORDER — ERGOCALCIFEROL 1.25 MG/1
50000 CAPSULE ORAL
Qty: 6 CAPSULE | Refills: 0 | Status: SHIPPED | OUTPATIENT
Start: 2023-10-10

## 2023-11-01 RX ORDER — AMLODIPINE BESYLATE 5 MG/1
5 TABLET ORAL 2 TIMES DAILY
Qty: 180 TABLET | Refills: 3 | Status: SHIPPED | OUTPATIENT
Start: 2023-11-01 | End: 2024-10-26

## 2023-11-15 ENCOUNTER — OFFICE VISIT (OUTPATIENT)
Dept: INTERNAL MEDICINE CLINIC | Facility: CLINIC | Age: 83
End: 2023-11-15
Payer: MEDICARE

## 2023-11-15 VITALS
TEMPERATURE: 98 F | WEIGHT: 152 LBS | OXYGEN SATURATION: 97 % | RESPIRATION RATE: 16 BRPM | HEART RATE: 64 BPM | BODY MASS INDEX: 24 KG/M2 | SYSTOLIC BLOOD PRESSURE: 128 MMHG | DIASTOLIC BLOOD PRESSURE: 62 MMHG

## 2023-11-15 DIAGNOSIS — H57.89 REDNESS OF RIGHT EYE: Primary | ICD-10-CM

## 2023-11-15 PROCEDURE — 99212 OFFICE O/P EST SF 10 MIN: CPT | Performed by: INTERNAL MEDICINE

## 2023-11-15 RX ORDER — NEOMYCIN SULFATE, POLYMYXIN B SULFATE, AND DEXAMETHASONE 3.5; 10000; 1 MG/G; [USP'U]/G; MG/G
OINTMENT OPHTHALMIC
COMMUNITY
Start: 2023-10-27

## 2023-11-15 RX ORDER — PREDNISOLONE ACETATE 10 MG/ML
1 SUSPENSION/ DROPS OPHTHALMIC 2 TIMES DAILY
COMMUNITY
Start: 2023-11-14

## 2023-11-30 ENCOUNTER — PATIENT MESSAGE (OUTPATIENT)
Dept: INTERNAL MEDICINE CLINIC | Facility: CLINIC | Age: 83
End: 2023-11-30

## 2023-11-30 NOTE — TELEPHONE ENCOUNTER
From: Thuan Asencio  To: Matthew Reese  Sent: 11/30/2023 3:59 PM CST  Subject: RSV    Dr Tyson Thomas, November 29, 2023 I had the   RSV shot at the [facility] in [place].  For the record,  Thanks,  Bunny Garner  07/12/1940

## 2023-12-11 ENCOUNTER — MED REC SCAN ONLY (OUTPATIENT)
Dept: INTERNAL MEDICINE CLINIC | Facility: CLINIC | Age: 83
End: 2023-12-11

## 2023-12-19 ENCOUNTER — OFFICE VISIT (OUTPATIENT)
Facility: LOCATION | Age: 83
End: 2023-12-19
Payer: MEDICARE

## 2023-12-19 DIAGNOSIS — H61.23 CERUMEN DEBRIS ON TYMPANIC MEMBRANE OF BOTH EARS: Primary | ICD-10-CM

## 2023-12-19 DIAGNOSIS — T16.2XXA FOREIGN BODY OF LEFT EAR, INITIAL ENCOUNTER: ICD-10-CM

## 2023-12-19 PROCEDURE — 99213 OFFICE O/P EST LOW 20 MIN: CPT | Performed by: OTOLARYNGOLOGY

## 2023-12-19 NOTE — PROGRESS NOTES
Nacho Chavis is a 80year old male. Chief Complaint   Patient presents with    Cerumen Impaction     HPI:   He complains of a funny feeling in the left ear. When he chews or moves his ear he notices something on his eardrum. There is no pain. REVIEW OF SYSTEMS:   GENERAL HEALTH: feels well otherwise  GENERAL : denies fever, chills, sweats, weight loss, weight gain  SKIN: denies any unusual skin lesions or rashes  RESPIRATORY: denies shortness of breath with exertion  NEURO: denies headaches    EXAM:   There were no vitals taken for this visit. System Findings Details   Constitutional  Overall appearance - Normal.   Psychiatric  Orientation - Oriented to time, place, person & situation. Appropriate mood and affect. Head/Face  Facial features -- Normal. Skull - Normal.   Eyes  Pupils equal ,round ,react to light and accomidate   Ears, Nose, Throat, Neck  There is wax in the ears was removed today under the microscope. More importantly there was some hair sitting on the eardrum which were removed under the microscope with alligator forceps. Otherwise no infection   Neurological  Memory - Normal. Cranial nerves - Cranial nerves II through XII grossly intact. Lymph Detail  Submental. Submandibular. Anterior cervical. Posterior cervical. Supraclavicular. ASSESSMENT AND PLAN:   1. Cerumen debris on tympanic membrane of both ears  Removed today    2. Foreign body of left ear, initial encounter  This was actually some hair sitting on the eardrum which were removed. His ear is better. The patient indicates understanding of these issues and agrees to the plan. No follow-ups on file.     Wing Daysi MD  12/19/2023  9:56 AM

## 2024-01-04 RX ORDER — OMEPRAZOLE 40 MG/1
40 CAPSULE, DELAYED RELEASE ORAL DAILY
Qty: 90 CAPSULE | Refills: 0 | Status: SHIPPED | OUTPATIENT
Start: 2024-01-04

## 2024-01-04 RX ORDER — TAMSULOSIN HYDROCHLORIDE 0.4 MG/1
0.4 CAPSULE ORAL DAILY
Qty: 90 CAPSULE | Refills: 0 | Status: SHIPPED | OUTPATIENT
Start: 2024-01-04

## 2024-01-04 RX ORDER — ERGOCALCIFEROL 1.25 MG/1
50000 CAPSULE ORAL
Qty: 6 CAPSULE | Refills: 0 | Status: SHIPPED | OUTPATIENT
Start: 2024-01-04

## 2024-01-05 RX ORDER — METOPROLOL SUCCINATE 25 MG/1
12.5 TABLET, EXTENDED RELEASE ORAL 2 TIMES DAILY
Qty: 90 TABLET | Refills: 3 | Status: SHIPPED | OUTPATIENT
Start: 2024-01-05

## 2024-01-05 RX ORDER — POTASSIUM CHLORIDE 750 MG/1
10 TABLET, FILM COATED, EXTENDED RELEASE ORAL DAILY
Qty: 90 TABLET | Refills: 3 | Status: SHIPPED | OUTPATIENT
Start: 2024-01-05

## 2024-01-05 RX ORDER — ATORVASTATIN CALCIUM 40 MG/1
40 TABLET, FILM COATED ORAL NIGHTLY
Qty: 90 TABLET | Refills: 3 | Status: SHIPPED | OUTPATIENT
Start: 2024-01-05

## 2024-01-10 ENCOUNTER — HOSPITAL ENCOUNTER (OUTPATIENT)
Dept: GENERAL RADIOLOGY | Facility: HOSPITAL | Age: 84
Discharge: HOME | End: 2024-01-10
Attending: INTERNAL MEDICINE
Payer: MEDICARE

## 2024-01-10 DIAGNOSIS — J44.9 CHRONIC OBSTRUCTIVE PULMONARY DISEASE, UNSPECIFIED COPD TYPE (HCC): ICD-10-CM

## 2024-01-10 PROCEDURE — 71046 X-RAY EXAM CHEST 2 VIEWS: CPT | Performed by: INTERNAL MEDICINE

## 2024-01-18 ENCOUNTER — OFFICE VISIT (OUTPATIENT)
Dept: INTERNAL MEDICINE CLINIC | Facility: CLINIC | Age: 84
End: 2024-01-18
Payer: MEDICARE

## 2024-01-18 VITALS
RESPIRATION RATE: 16 BRPM | TEMPERATURE: 98 F | SYSTOLIC BLOOD PRESSURE: 124 MMHG | HEART RATE: 60 BPM | WEIGHT: 150 LBS | OXYGEN SATURATION: 97 % | BODY MASS INDEX: 23 KG/M2 | DIASTOLIC BLOOD PRESSURE: 62 MMHG

## 2024-01-18 DIAGNOSIS — I10 BENIGN ESSENTIAL HTN: ICD-10-CM

## 2024-01-18 DIAGNOSIS — R60.0 LEG EDEMA, RIGHT: Primary | ICD-10-CM

## 2024-01-18 PROCEDURE — 99214 OFFICE O/P EST MOD 30 MIN: CPT | Performed by: INTERNAL MEDICINE

## 2024-01-18 RX ORDER — BUMETANIDE 1 MG/1
1 TABLET ORAL DAILY
Qty: 90 TABLET | Refills: 3 | Status: SHIPPED | OUTPATIENT
Start: 2024-01-18

## 2024-01-19 DIAGNOSIS — M79.89 LEG SWELLING: Primary | ICD-10-CM

## 2024-01-19 NOTE — PROGRESS NOTES
Subjective:   Patient ID: Jose Angel Kincaid is a 83 year old male.persistent right leg edema    Persistent edema RLE despite Lasix 40 started after successful back surgery performed 3/2023. Neg Doppler x 2 in 4/2023        History/Other:   Review of Systems   Constitutional:  Negative for fatigue and fever.   HENT: Negative.     Eyes:         Cataracts removed both eyes   Respiratory: Negative.     Cardiovascular:  Positive for leg swelling (benjie RLE).        HTN and lipids controlled on meds   Gastrointestinal:  Negative for abdominal pain, diarrhea, nausea and vomiting.        Reflux controlled on med, inguinal hernia repair     Endocrine: Negative.    Genitourinary:  Positive for difficulty urinating (BPH).        Renal cysts   Musculoskeletal:  Positive for arthralgias. Negative for back pain (gone since OR 3/2023) and gait problem.        Lumbar spinal stenosis-back OR 3/2023   Skin:         Hx multiple skin cancers   Neurological:  Negative for weakness.   Hematological: Negative.    Psychiatric/Behavioral: Negative.       Current Outpatient Medications   Medication Sig Dispense Refill    bumetanide (BUMEX) 1 MG Oral Tab Take 1 tablet (1 mg total) by mouth daily. 90 tablet 3    metoprolol succinate ER 25 MG Oral Tablet 24 Hr Take 0.5 tablets (12.5 mg total) by mouth in the morning and 0.5 tablets (12.5 mg total) before bedtime. 90 tablet 3    atorvastatin 40 MG Oral Tab Take 1 tablet (40 mg total) by mouth nightly. 90 tablet 3    Potassium Chloride ER 10 MEQ Oral Tab CR Take 1 tablet (10 mEq total) by mouth daily. 90 tablet 3    OMEPRAZOLE 40 MG Oral Capsule Delayed Release Take 1 capsule (40 mg total) by mouth daily. 90 capsule 0    TAMSULOSIN 0.4 MG Oral Cap TAKE ONE CAPSULE BY MOUTH ONE TIME DAILY 90 capsule 0    ERGOCALCIFEROL 1.25 MG (10259 UT) Oral Cap Take 1 capsule (50,000 Units total) by mouth every 14 (fourteen) days. 6 capsule 0    Apoaequorin (PREVAGEN) 10 MG Oral Cap Take 1 tablet by mouth daily.       amLODIPine 5 MG Oral Tab Take 1 tablet (5 mg total) by mouth at bedtime.      fluticasone propionate 50 MCG/ACT Inhalation Aerosol Powder, Breath Activated Inhale 1 puff into the lungs as needed. 1 each 3    aspirin 81 MG Oral Tab EC Take 1 tablet (81 mg total) by mouth every morning. 30 tablet 0    ascorbic acid 1000 MG Oral Tab Take 0.5 tablets (500 mg total) by mouth 2 (two) times daily.      Selenium 100 MCG Oral Tab Take 1 tablet (100 mcg total) by mouth every evening. 60 tablet 0    magnesium 250 MG Oral Tab Take 1 tablet (250 mg total) by mouth every other day. Magnesium oxide 250mg every other day. 30 tablet 0    omega-3 fatty acids 1000 MG Oral Cap Take 1,000 mg by mouth in the morning and 1,000 mg before bedtime.      Nutritional Supplements (JUICE PLUS FIBRE OR) Take 2 tablets by mouth in the morning, at noon, and at bedtime.      Zinc 50 MG Oral Tab Take 1 tablet by mouth one time.      Coenzyme Q10 (COQ10) 100 MG Oral Cap Take 1 capsule by mouth at bedtime.      Glucosamine-Chondroit-Vit C-Mn (GLUCOSAMINE CHONDR 1500 COMPLX OR) Take 1 tablet by mouth 2 (two) times daily.      Calcium Carbonate-Vitamin D (CALTRATE 600+D OR) Take 1 tablet by mouth TID PC.       Allergies:  Allergies   Allergen Reactions    Codeine OTHER (SEE COMMENTS)     Throat  tightness    Hctz [Hydrochlorothiazide] UNKNOWN    Nsaids OTHER (SEE COMMENTS)     GI    Zofran [Ondansetron] OTHER (SEE COMMENTS)     Constipation     Pepcid [Famotidine] OTHER (SEE COMMENTS)     neck and shoulder pains       Objective:   Physical Exam  Constitutional:       General: He is not in acute distress.     Appearance: He is not ill-appearing.   HENT:      Head:      Comments: Neg HEENT  Cardiovascular:      Rate and Rhythm: Normal rate and regular rhythm.      Heart sounds: Normal heart sounds.   Pulmonary:      Effort: No respiratory distress.      Breath sounds: Normal breath sounds.   Abdominal:      Tenderness: There is no abdominal tenderness.    Genitourinary:     Rectum: Guaiac result negative.      Comments: Mild BPH, no recurrence of hernia  Musculoskeletal:         General: No tenderness (lumbar incision).      Right lower leg: Edema (mild) present.      Left lower leg: Edema (minimal) present.   Skin:     Comments: Multiple surgical scars   Neurological:      Mental Status: He is alert. Mental status is at baseline.      Motor: No weakness.      Gait: Gait normal.   Psychiatric:         Mood and Affect: Mood normal.       Active Problems:  Patient Active Problem List   Diagnosis    Polycystic kidney    Skin cancer    CAD (coronary artery disease)    History of migraine    Osteopenia    Carotid artery plaque    Dyslipidemia    Benign prostatic hyperplasia    Elevated lipoprotein(a)    Arteriosclerosis of thoracic aorta ([facility])    Benign essential HTN    Hiatal hernia with GERD    COPD (chronic obstructive pulmonary disease) ([facility])    Lumbar radiculopathy    Elevated C-reactive protein (CRP)    Bilateral cataracts    Lumbar spondylosis       Past Medical History:  Past Medical History:   Diagnosis Date    Arteriosclerosis of thoracic aorta ([facility]) 01/21/2020    CT incidental December 2019    Benign non-nodular prostatic hyperplasia with lower urinary tract symptoms 09/15/2016    By imaging and modest symptoms     Bilateral cataracts 09/14/2015    Formatting of this note might be different from the original.  Overview:   MODEST   -2014/2015    Bilateral plantar fasciitis 09/23/2019    BPH (benign prostatic hyperplasia)     BPH (benign prostatic hypertrophy)     CAD (coronary artery disease) 09/24/2013    LAD   STENTED   JONAS  -  9/2013 DR ZABALA  -  45%  LEFT   MAIN ALSO   SEEN   LV  45%      Carotid artery plaque 09/14/2015     15-49%    9/2019     30% bilateral December 2017    Cataract     COPD (chronic obstructive pulmonary disease) ([facility])     COVID-19 11/25/2022    3 days of cough, runny nose, low saturations needing oxygen.    Elevated C-reactive protein  (CRP) 09/15/2016    Formatting of this note might be different from the original.  Overview:   September 2016 will repeat occurred during infection viral    Elevated lipoprotein(a) 09/23/2019    Essential hypertension     Herpes     Hiatal hernia with GERD     History of migraine 08/08/2014    EXERCISE X 4  SINCE 2012     Hyperlipidemia     Inguinal hernia     Lumbar radiculopathy 04/06/2022    Surgery 3/2023    Mass of upper lobe of left lung 12/04/2019    CT pending first week December 2019    OA (osteoarthritis)     hips and hands    Osteopenia 09/14/2015    T     -2.1    2014     Pneumonia due to organism     Polycystic renal disease     Skin cancer 09/07/2012    New  Episode  Left  Cheek  -   9/2018  -    SQUAMOUS   NOSE.   F/U DR DEE  2X  YEARLY  -   WITH  RECENT   AK'S    Squamous cell carcinoma     nose x 3 and hand    Vitamin D deficiency        Past Surgical History:  Past Surgical History:   Procedure Laterality Date    ANGIOPLASTY (CORONARY)  09/2013 9/13 cath 40% ostial LM, 80-90% mid to distal LAD (2.5 x 26mm Resolute Integrity) EF 45%    APPENDECTOMY  1954    CATARACT      COLONOSCOPY  01/2004    COLONOSCOPY N/A 10/31/2014    Procedure: COLONOSCOPY;  Surgeon: Ken Vinson MD;  Location:  ENDOSCOPY    HERNIA SURGERY  1997    right    INJECTION, ANESTHETIC/STEROID, PARAVERTEBRAL FACET JOINT/NERVE; LUMBAR/SACRAL, SINGLE LEVEL  04/06/2022    [facility]    IR ASPIRATION/INJECTION  04/28/2022    [facility]    LUMBAR SPINE SURGERY  03/08/2023        SKIN SURGERY  01/08/2020    MOHS SCCIS Right mid posterior scalp    SKIN SURGERY  02/11/2020    MOHS, BCC, right preauricular - Dr. ABA Harris       Family History:  Family History   Problem Relation Age of Onset    Pacemaker Mother     Hypertension Mother        Social History:  Social History     Socioeconomic History    Marital status: Single     Spouse name: Not on file    Number of children: 0    Years of education: 16 college    Highest education  level: Not on file   Occupational History    Occupation: retired   Tobacco Use    Smoking status: Never    Smokeless tobacco: Never   Vaping Use    Vaping Use: Never used   Substance and Sexual Activity    Alcohol use: Not Currently     Comment: GLASS OF WINE WITH DINNER/ 4-5 nights week    Drug use: No    Sexual activity: Never   Other Topics Concern     Service Not Asked    Blood Transfusions Not Asked    Caffeine Concern Yes     Comment: some    Occupational Exposure Not Asked    Hobby Hazards Not Asked    Sleep Concern Not Asked    Stress Concern No    Weight Concern No    Special Diet No    Back Care Not Asked    Exercise Yes     Comment: vigorous    Bike Helmet Not Asked    Seat Belt Yes    Self-Exams Not Asked   Social History Narrative    Single    No kids    Retired sales (chemical sales to steel industry)    No tobacco, drugs    1 wine 5-6x/wk     Social Determinants of Health     Financial Resource Strain: Not on file   Food Insecurity: Not on file   Transportation Needs: Not on file   Physical Activity: Not on file   Stress: Not on file   Social Connections: Not on file   Housing Stability: Not on file       Health Maintenance:    Immunization History   Administered Date(s) Administered    Covid-19 Vaccine Pfizer 30 mcg/0.3 ml 02/02/2021, 02/23/2021, 09/24/2021, 05/13/2022    Covid-19 Vaccine Pfizer Bivalent 30mcg/0.3mL 09/14/2022    Covid-19 Vaccine Pfizer Juan-Sucrose 30 mcg/0.3 ml 05/13/2022    DTAP 09/14/2014    FLU VAC High Dose 65 YRS & Older PRSV Free (21735) 10/08/2014, 10/13/2015, 09/23/2019, 09/10/2020, 10/23/2021, 10/10/2022    FLUAD High Dose 65 yr and older (17951) 10/09/2017, 09/24/2018    FLUZONE 6 months and older PFS 0.5 ml (37238) 09/10/2020    Fluzone Vaccine Medicare () 10/08/2014, 10/13/2015, 10/24/2016, 09/23/2019    HIGH DOSE FLU 65 YRS AND OLDER PRSV FREE SINGLE D (16795) FLU CLINIC 10/24/2016    Influenza 09/14/2009, 10/18/2012, 10/11/2013    Pneumococcal (Prevnar  13) 09/14/2015    Pneumovax 23 09/08/2005    RSV, recombinant, RSVpreF, adjuvanted (Arexvy) 11/29/2023    TDAP 09/14/2014, 06/05/2019    Tetanus 09/15/2006    Zoster Vaccine Live (Zostavax) 01/01/2008    Zoster Vaccine Recombinant Adjuvanted (Shingrix) 10/17/2019, 02/22/2020         Vitals:  Vitals:    01/18/24 1357   BP: 124/62   BP Location: Left arm   Patient Position: Sitting   Cuff Size: adult   Pulse: 60   Resp: 16   Temp: 97.5 °F (36.4 °C)   TempSrc: Temporal   SpO2: 97%   Weight: 150 lb (68 kg)     Body mass index is 23.49 kg/m².    Reviewed Home Bps occ 150s      Assessment & Plan:   1. Leg edema, right    2. Benign essential HTN      RLE edema persists despite Lasix 40  Could be side effect of Amlodipine but expect BILATERAL  Try dec Amlodipine 5 bid to every day and switch Lasix 40 to Bumex 1mg  Stricter lo salt diet  Last two neg Dopplers for DVT 4/2022 almost 9mos ago  Reluctantly will order another Doppler  Going to [place] most of Feb need to check this out before trip      Meds This Visit:  Requested Prescriptions     Signed Prescriptions Disp Refills    bumetanide (BUMEX) 1 MG Oral Tab 90 tablet 3     Sig: Take 1 tablet (1 mg total) by mouth daily.       Imaging & Referrals:  None

## 2024-01-22 ENCOUNTER — TELEPHONE (OUTPATIENT)
Dept: INTERNAL MEDICINE CLINIC | Facility: CLINIC | Age: 84
End: 2024-01-22

## 2024-01-22 RX ORDER — AMLODIPINE BESYLATE 5 MG/1
5 TABLET ORAL DAILY
Qty: 90 TABLET | Refills: 3 | Status: SHIPPED | OUTPATIENT
Start: 2024-01-22 | End: 2025-01-16

## 2024-01-22 NOTE — TELEPHONE ENCOUNTER
Patient is requesting a refill on his prescription for Amlodipine 5MG tablets 90 pills.    [id number] - [place], [place] - [address] [phone], [phone]

## 2024-01-29 ENCOUNTER — VIRTUAL PHONE E/M (OUTPATIENT)
Dept: INTERNAL MEDICINE CLINIC | Facility: CLINIC | Age: 84
End: 2024-01-29
Payer: MEDICARE

## 2024-01-29 DIAGNOSIS — I10 BENIGN ESSENTIAL HTN: ICD-10-CM

## 2024-01-29 DIAGNOSIS — R60.0 LEG EDEMA, RIGHT: Primary | ICD-10-CM

## 2024-01-29 NOTE — PROGRESS NOTES
Virtual Telephone Check-In    Jose Angel Kincaid verbally consents to a Virtual/Telephone Check-In visit on 01/29/24.  Patient has been referred to the [facility] website at [email or web address] to review the yearly Consent to Treat document.    Patient understands and accepts financial responsibility for any deductible, co-insurance and/or co-pays associated with this service.    Duration of the service: 15 minutes audio only      Summary of topics discussed:     RLE almost gone on switch from Lasix to Bumex and reduction amlodipine 5 bid to every day. Reports home BPs 120-130/60-70s. Has Doppler scheduled 1/31 and BMP for kidneys and lytes to be safe before going to [place] and will see me on return early March.           Cl De Leon MD

## 2024-01-31 ENCOUNTER — LAB ENCOUNTER (OUTPATIENT)
Dept: LAB | Age: 84
End: 2024-01-31
Attending: INTERNAL MEDICINE
Payer: MEDICARE

## 2024-01-31 ENCOUNTER — HOSPITAL ENCOUNTER (OUTPATIENT)
Dept: ULTRASOUND IMAGING | Age: 84
Discharge: HOME | End: 2024-01-31
Attending: INTERNAL MEDICINE
Payer: MEDICARE

## 2024-01-31 DIAGNOSIS — R60.0 LEG EDEMA, RIGHT: ICD-10-CM

## 2024-01-31 DIAGNOSIS — M79.89 LEG SWELLING: ICD-10-CM

## 2024-01-31 LAB
ANION GAP SERPL CALC-SCNC: 2 MMOL/L (ref 0–18)
BUN BLD-MCNC: 22 MG/DL (ref 9–23)
CALCIUM BLD-MCNC: 9.9 MG/DL (ref 8.5–10.1)
CHLORIDE SERPL-SCNC: 108 MMOL/L (ref 98–112)
CO2 SERPL-SCNC: 33 MMOL/L (ref 21–32)
CREAT BLD-MCNC: 1.09 MG/DL
EGFRCR SERPLBLD CKD-EPI 2021: 67 ML/MIN/1.73M2 (ref 60–?)
FASTING STATUS PATIENT QL REPORTED: NO
GLUCOSE BLD-MCNC: 96 MG/DL (ref 70–99)
OSMOLALITY SERPL CALC.SUM OF ELEC: 299 MOSM/KG (ref 275–295)
POTASSIUM SERPL-SCNC: 4.1 MMOL/L (ref 3.5–5.1)
SODIUM SERPL-SCNC: 143 MMOL/L (ref 136–145)

## 2024-01-31 PROCEDURE — 80048 BASIC METABOLIC PNL TOTAL CA: CPT

## 2024-01-31 PROCEDURE — 36415 COLL VENOUS BLD VENIPUNCTURE: CPT

## 2024-01-31 PROCEDURE — 93970 EXTREMITY STUDY: CPT | Performed by: INTERNAL MEDICINE

## 2024-01-31 RX ORDER — BUMETANIDE 1 MG/1
TABLET ORAL
COMMUNITY
Start: 2024-01-31

## 2024-02-02 ENCOUNTER — PATIENT MESSAGE (OUTPATIENT)
Dept: INTERNAL MEDICINE CLINIC | Facility: CLINIC | Age: 84
End: 2024-02-02

## 2024-02-02 ENCOUNTER — VIRTUAL PHONE E/M (OUTPATIENT)
Dept: INTERNAL MEDICINE CLINIC | Facility: CLINIC | Age: 84
End: 2024-02-02
Payer: MEDICARE

## 2024-02-02 DIAGNOSIS — E87.6 HYPOKALEMIA: Primary | ICD-10-CM

## 2024-02-02 RX ORDER — FLUTICASONE PROPIONATE 50 MCG
2 SPRAY, SUSPENSION (ML) NASAL
Qty: 11.1 ML | Refills: 0 | Status: SHIPPED | OUTPATIENT
Start: 2024-02-02 | End: 2025-01-27

## 2024-02-02 NOTE — TELEPHONE ENCOUNTER
From: Jose Angel Kincaid  To: Cl De Leon  Sent: 2/2/2024 8:18 AM CST  Subject: Flutocasone Propionate 50mcg Nasal spray    Stoney/Dulce, I am out of this product. Would you have someone contact the [facility]  in [place] today.     Thanks,  Jose Angel Kincaid  [birth year]

## 2024-02-12 NOTE — PROGRESS NOTES
Virtual Telephone Check-In    Jose Angel Kincaid verbally consents to a Virtual/Telephone Check-In visit on 02/152/24.  Patient has been referred to the [facility] website at [email or web address] to review the yearly Consent to Treat document.    Patient understands and accepts financial responsibility for any deductible, co-insurance and/or co-pays associated with this service.    Duration of the service: 10 minutes audio only      Summary of topics discussed:     Discussed recent lab showing Pot level normal 4.1. Was on Pot supplement, not on diuretic , told he could stop and recheck in 1mo. Reassured      Cl De Leon MD

## 2024-03-15 ENCOUNTER — LAB ENCOUNTER (OUTPATIENT)
Dept: LAB | Age: 84
End: 2024-03-15
Attending: INTERNAL MEDICINE
Payer: MEDICARE

## 2024-03-15 DIAGNOSIS — R97.20 PSA ELEVATION: ICD-10-CM

## 2024-03-15 DIAGNOSIS — E78.5 DYSLIPIDEMIA: ICD-10-CM

## 2024-03-15 DIAGNOSIS — E53.8 VITAMIN B12 DEFICIENCY: ICD-10-CM

## 2024-03-15 LAB
CHOLEST SERPL-MCNC: 118 MG/DL (ref ?–200)
FASTING PATIENT LIPID ANSWER: YES
HDLC SERPL-MCNC: 67 MG/DL (ref 40–59)
LDLC SERPL CALC-MCNC: 41 MG/DL (ref ?–100)
NONHDLC SERPL-MCNC: 51 MG/DL (ref ?–130)
PSA FREE MFR SERPL: 26 %
PSA FREE SERPL-MCNC: 0.72 NG/ML
PSA SERPL-MCNC: 2.77 NG/ML (ref ?–4)
TRIGL SERPL-MCNC: 38 MG/DL (ref 30–149)
VIT B12 SERPL-MCNC: 508 PG/ML (ref 193–986)
VLDLC SERPL CALC-MCNC: 5 MG/DL (ref 0–30)

## 2024-03-15 PROCEDURE — 84154 ASSAY OF PSA FREE: CPT

## 2024-03-15 PROCEDURE — 80061 LIPID PANEL: CPT

## 2024-03-15 PROCEDURE — 84153 ASSAY OF PSA TOTAL: CPT

## 2024-03-15 PROCEDURE — 82607 VITAMIN B-12: CPT

## 2024-03-15 PROCEDURE — 36415 COLL VENOUS BLD VENIPUNCTURE: CPT

## 2024-03-18 ENCOUNTER — OFFICE VISIT (OUTPATIENT)
Dept: INTERNAL MEDICINE CLINIC | Facility: CLINIC | Age: 84
End: 2024-03-18
Payer: MEDICARE

## 2024-03-18 VITALS
BODY MASS INDEX: 23 KG/M2 | OXYGEN SATURATION: 97 % | SYSTOLIC BLOOD PRESSURE: 142 MMHG | DIASTOLIC BLOOD PRESSURE: 68 MMHG | TEMPERATURE: 98 F | WEIGHT: 148 LBS | RESPIRATION RATE: 16 BRPM | HEART RATE: 64 BPM

## 2024-03-18 DIAGNOSIS — E78.5 DYSLIPIDEMIA: ICD-10-CM

## 2024-03-18 DIAGNOSIS — R60.0 LEG EDEMA, RIGHT: ICD-10-CM

## 2024-03-18 DIAGNOSIS — M48.062 SPINAL STENOSIS OF LUMBAR REGION WITH NEUROGENIC CLAUDICATION: ICD-10-CM

## 2024-03-18 DIAGNOSIS — R97.20 ELEVATED PSA: ICD-10-CM

## 2024-03-18 DIAGNOSIS — I10 BENIGN ESSENTIAL HTN: Primary | ICD-10-CM

## 2024-03-18 PROCEDURE — 99214 OFFICE O/P EST MOD 30 MIN: CPT | Performed by: INTERNAL MEDICINE

## 2024-03-18 RX ORDER — METOPROLOL SUCCINATE 25 MG/1
25 TABLET, EXTENDED RELEASE ORAL DAILY
Qty: 135 TABLET | Refills: 3 | Status: SHIPPED | OUTPATIENT
Start: 2024-03-18

## 2024-03-18 NOTE — PROGRESS NOTES
Subjective:   Patient ID: Jose Angel Kincaid is a 83 year old male.fu HTN and Back    Back ok 1yr postop, mild HTN still with BPs avg 140s. Edema RLE gone on Bumex and reduction amlodipine. Neg for DVT.        History/Other:   Review of Systems   Constitutional:  Negative for fatigue and fever.   HENT: Negative.     Eyes:         Cataracts removed both eyes   Respiratory: Negative.     Cardiovascular:  Negative for leg swelling (gone).        HTN and lipids on meds-amlodipine reduced 5mg bid to qd   Gastrointestinal:  Negative for abdominal pain, diarrhea, nausea and vomiting.        Reflux controlled on med, inguinal hernia repair     Endocrine: Negative.    Genitourinary:  Positive for difficulty urinating (BPH).        Renal cysts   Musculoskeletal:  Positive for arthralgias. Negative for back pain (gone since OR 3/2023) and gait problem.        Lumbar spinal stenosis-back OR 3/2023   Skin:         Hx multiple skin cancers   Neurological:  Negative for weakness.   Hematological: Negative.    Psychiatric/Behavioral: Negative.       Current Outpatient Medications   Medication Sig Dispense Refill    metoprolol succinate ER 25 MG Oral Tablet 24 Hr Take 1 tablet (25 mg total) by mouth daily. Take 25 mg tablet in the am and 1/2 (12.5 mg) in the pm 135 tablet 3    bumetanide (BUMEX) 1 MG Oral Tab Take 1 mg on Mondays, Wednesdays and Fridays AM.      atorvastatin 40 MG Oral Tab Take 1 tablet (40 mg total) by mouth nightly. 90 tablet 3    OMEPRAZOLE 40 MG Oral Capsule Delayed Release Take 1 capsule (40 mg total) by mouth daily. 90 capsule 0    TAMSULOSIN 0.4 MG Oral Cap TAKE ONE CAPSULE BY MOUTH ONE TIME DAILY 90 capsule 0    ERGOCALCIFEROL 1.25 MG (38342 UT) Oral Cap Take 1 capsule (50,000 Units total) by mouth every 14 (fourteen) days. 6 capsule 0    Apoaequorin (PREVAGEN) 10 MG Oral Cap Take 1 tablet by mouth daily.      amLODIPine 5 MG Oral Tab Take 1 tablet (5 mg total) by mouth at bedtime.      fluticasone  propionate 50 MCG/ACT Inhalation Aerosol Powder, Breath Activated Inhale 1 puff into the lungs as needed. 1 each 3    aspirin 81 MG Oral Tab EC Take 1 tablet (81 mg total) by mouth every morning. 30 tablet 0    ascorbic acid 1000 MG Oral Tab Take 0.5 tablets (500 mg total) by mouth 2 (two) times daily.      Selenium 100 MCG Oral Tab Take 1 tablet (100 mcg total) by mouth every evening. 60 tablet 0    magnesium 250 MG Oral Tab Take 1 tablet (250 mg total) by mouth every other day. Magnesium oxide 250mg every other day. 30 tablet 0    omega-3 fatty acids 1000 MG Oral Cap Take 1,000 mg by mouth in the morning and 1,000 mg before bedtime.      Nutritional Supplements (JUICE PLUS FIBRE OR) Take 2 tablets by mouth in the morning, at noon, and at bedtime.      Zinc 50 MG Oral Tab Take 1 tablet by mouth one time.      Coenzyme Q10 (COQ10) 100 MG Oral Cap Take 1 capsule by mouth at bedtime.      Glucosamine-Chondroit-Vit C-Mn (GLUCOSAMINE CHONDR 1500 COMPLX OR) Take 1 tablet by mouth 2 (two) times daily.      Calcium Carbonate-Vitamin D (CALTRATE 600+D OR) Take 1 tablet by mouth in the morning and 1 tablet before bedtime.       Allergies:  Allergies   Allergen Reactions    Codeine OTHER (SEE COMMENTS)     Throat  tightness    Hctz [Hydrochlorothiazide] UNKNOWN    Nsaids OTHER (SEE COMMENTS)     GI    Zofran [Ondansetron] OTHER (SEE COMMENTS)     Constipation     Pepcid [Famotidine] OTHER (SEE COMMENTS)     neck and shoulder pains       Objective:   Physical Exam  Constitutional:       General: He is not in acute distress.     Appearance: He is not ill-appearing.   HENT:      Head:      Comments: Neg HEENT  Cardiovascular:      Rate and Rhythm: Normal rate and regular rhythm.      Heart sounds: Normal heart sounds.   Pulmonary:      Effort: No respiratory distress.      Breath sounds: Normal breath sounds.   Abdominal:      Tenderness: There is no abdominal tenderness.   Musculoskeletal:         General: No tenderness (lumbar  incision).      Right lower leg: No edema.      Left lower leg: No edema.   Skin:     Comments: Multiple surgical scars   Neurological:      Mental Status: He is alert. Mental status is at baseline.      Motor: No weakness.      Gait: Gait normal.   Psychiatric:         Mood and Affect: Mood normal.         Active Problems:  Patient Active Problem List   Diagnosis    Polycystic kidney    Skin cancer    CAD (coronary artery disease)    History of migraine    Osteopenia    Carotid artery plaque    Dyslipidemia    Benign prostatic hyperplasia    Elevated lipoprotein(a)    Arteriosclerosis of thoracic aorta (HCC)    Benign essential HTN    Hiatal hernia with GERD    COPD (chronic obstructive pulmonary disease) ([facility])    Lumbar radiculopathy    Elevated C-reactive protein (CRP)    Bilateral cataracts    Lumbar spondylosis       Past Medical History:  Past Medical History:   Diagnosis Date    Arteriosclerosis of thoracic aorta ([facility]) 01/21/2020    CT incidental December 2019    Benign non-nodular prostatic hyperplasia with lower urinary tract symptoms 09/15/2016    By imaging and modest symptoms     Bilateral cataracts 09/14/2015    Formatting of this note might be different from the original.  Overview:   MODEST   -2014/2015    Bilateral plantar fasciitis 09/23/2019    BPH (benign prostatic hyperplasia)     BPH (benign prostatic hypertrophy)     CAD (coronary artery disease) 09/24/2013    LAD   STENTED   JONAS  -  9/2013 DR ZABALA  -  45%  LEFT   MAIN ALSO   SEEN   LV  45%      Carotid artery plaque 09/14/2015     15-49%    9/2019     30% bilateral December 2017    Cataract     COPD (chronic obstructive pulmonary disease) ([facility])     COVID-19 11/25/2022    3 days of cough, runny nose, low saturations needing oxygen.    Elevated C-reactive protein (CRP) 09/15/2016    Formatting of this note might be different from the original.  Overview:   September 2016 will repeat occurred during infection viral    Elevated lipoprotein(a)  09/23/2019    Essential hypertension     Herpes     Hiatal hernia with GERD     History of migraine 08/08/2014    EXERCISE X 4  SINCE 2012     Hyperlipidemia     Inguinal hernia     Lumbar radiculopathy 04/06/2022    Surgery 3/2023    Mass of upper lobe of left lung 12/04/2019    CT pending first week December 2019    OA (osteoarthritis)     hips and hands    Osteopenia 09/14/2015    T     -2.1    2014     Pneumonia due to organism     Polycystic renal disease     Skin cancer 09/07/2012    New  Episode  Left  Cheek  -   9/2018  -    SQUAMOUS   NOSE.   F/U DR DEE  2X  YEARLY  -   WITH  RECENT   AK'S    Squamous cell carcinoma     nose x 3 and hand    Vitamin D deficiency        Past Surgical History:  Past Surgical History:   Procedure Laterality Date    ANGIOPLASTY (CORONARY)  09/2013 9/13 cath 40% ostial LM, 80-90% mid to distal LAD (2.5 x 26mm Resolute Integrity) EF 45%    APPENDECTOMY  1954    CATARACT      COLONOSCOPY  01/2004    COLONOSCOPY N/A 10/31/2014    Procedure: COLONOSCOPY;  Surgeon: Ken Vinson MD;  Location:  ENDOSCOPY    HERNIA SURGERY  1997    right    INJECTION, ANESTHETIC/STEROID, PARAVERTEBRAL FACET JOINT/NERVE; LUMBAR/SACRAL, SINGLE LEVEL  04/06/2022    [facility]    IR ASPIRATION/INJECTION  04/28/2022    [facility]    LUMBAR SPINE SURGERY  03/08/2023        SKIN SURGERY  01/08/2020    MOHS SCCIS Right mid posterior scalp    SKIN SURGERY  02/11/2020    MOHS, BCC, right preauricular - Dr. ABA Harris       Family History:  Family History   Problem Relation Age of Onset    Pacemaker Mother     Hypertension Mother        Social History:  Social History     Socioeconomic History    Marital status: Single     Spouse name: Not on file    Number of children: 0    Years of education: 16 college    Highest education level: Not on file   Occupational History    Occupation: retired   Tobacco Use    Smoking status: Never    Smokeless tobacco: Never   Vaping Use    Vaping Use: Never used   Substance and  Sexual Activity    Alcohol use: Not Currently     Comment: GLASS OF WINE WITH DINNER/ 4-5 nights week    Drug use: No    Sexual activity: Never   Other Topics Concern     Service Not Asked    Blood Transfusions Not Asked    Caffeine Concern Yes     Comment: some    Occupational Exposure Not Asked    Hobby Hazards Not Asked    Sleep Concern Not Asked    Stress Concern No    Weight Concern No    Special Diet No    Back Care Not Asked    Exercise Yes     Comment: vigorous    Bike Helmet Not Asked    Seat Belt Yes    Self-Exams Not Asked   Social History Narrative    Single    No kids    Retired sales (chemical sales to steel industry)    No tobacco, drugs    1 wine 5-6x/wk     Social Determinants of Health     Financial Resource Strain: Not on file   Food Insecurity: Not on file   Transportation Needs: Not on file   Physical Activity: Not on file   Stress: Not on file   Social Connections: Not on file   Housing Stability: Not on file       Health Maintenance:    Immunization History   Administered Date(s) Administered    Covid-19 Vaccine Pfizer 30 mcg/0.3 ml 02/02/2021, 02/23/2021, 09/24/2021, 05/13/2022    Covid-19 Vaccine Pfizer Bivalent 30mcg/0.3mL 09/14/2022    Covid-19 Vaccine Pfizer Juan-Sucrose 30 mcg/0.3 ml 05/13/2022    DTAP 09/14/2014    FLU VAC High Dose 65 YRS & Older PRSV Free (18818) 10/08/2014, 10/13/2015, 09/23/2019, 09/10/2020, 10/23/2021, 10/10/2022    FLUAD High Dose 65 yr and older (39320) 10/09/2017, 09/24/2018    FLUZONE 6 months and older PFS 0.5 ml (19098) 09/10/2020    Fluzone Vaccine Medicare () 10/08/2014, 10/13/2015, 10/24/2016, 09/23/2019    HIGH DOSE FLU 65 YRS AND OLDER PRSV FREE SINGLE D (87632) FLU CLINIC 10/24/2016    Influenza 09/14/2009, 10/18/2012, 10/11/2013    Pneumococcal (Prevnar 13) 09/14/2015    Pneumovax 23 09/08/2005    RSV, recombinant, RSVpreF, adjuvanted (Arexvy) 11/29/2023    TDAP 09/14/2014, 06/05/2019    Tetanus 09/15/2006    Zoster Vaccine Live (Zostavax)  01/01/2008    Zoster Vaccine Recombinant Adjuvanted (Shingrix) 10/17/2019, 02/22/2020       Labs and Tests:  Patient Active Problem List   Component Date Value Ref Range Status    Cholesterol, Total 03/15/2024 118  <200 mg/dL Final    Desirable  <200 mg/dL  Borderline  200-239 mg/dL  High      >=240 mg/dL        HDL Cholesterol 03/15/2024 67 (H)  40 - 59 mg/dL Final    Interpretive Information:   An HDL cholesterol <40 mg/dL is low and constitutes a coronary heart disease risk factor. An HDL cholesterol >60 mg/dL is a negative risk factor for coronary heart disease.        Triglycerides 03/15/2024 38  30 - 149 mg/dL Final    Reference interval for fasting triglycerides  Desirable: <150 mg/dL  Borderline: 150-199 mg/dL  High: 200-499 mg/dL  Very High: >=500 mg/dL          LDL Cholesterol 03/15/2024 41  <100 mg/dL Final    Desirable <100 mg/dL   Borderline 100-129 mg/dL   High     >=130mg/dL        VLDL 03/15/2024 5  0 - 30 mg/dL Final    Non HDL Chol 03/15/2024 51  <130 mg/dL Final    Desirable  <130 mg/dL   Borderline  130-159 mg/dL   High        160-189 mg/dL       Very high >=190 mg/dL        Patient Fasting for Lipid? 03/15/2024 Yes   Final    Vitamin B12 03/15/2024 508  193 - 986 pg/mL Final    Vitamin B12 Reference Range      Deficient:      <150 pg/mL      Indeterminate   150-192 pg/mL      Normal:         193-986 pg/mL      This test may exhibit interference when a sample is collected from a person who is consuming high dose of biotin (a.k.a., vitamin B7, vitamin H, coenzyme R) supplements resulting in serum concentrations >100 ng/mL.  Intake of the recommended daily allowance (RDA) for biotin (0.03 mg) has not been shown to typically cause significant interference; however, high dose daily dietary supplements may contain biotin concentrations greater than 150 times (5-10 mg) the RDA.  It is recommended that physicians ask all patients who may be on biotin supplementation to stop biotin consumption at least  72 hours prior to collection of a new sample.      PSA 03/15/2024 2.77  <=4.00 ng/mL Final    Comment: INTERPRETIVE INFORMATION: TOTAL PROSTATE SPECIFIC ANTIGEN:       The Siemens Total PSA(TPSA)chemiluminescent(LOCI)immunoassay was used. Results obtained with different test methods or kits cannot be used interchangeably. The Siemens TPSA is approved for use as an aid in the detection of prostate cancer when used in conjunction with a digital rectal exam in men age 50 or older.     The Siemens TPSA method is also indicated for use as an aid in the management monitoring of prostate cancer patients. Elevated PSA concentrations can only suggest the presence of prostate cancer until biopsy is performed, which is required for diagnosis of cancer. PSA concentrations can be elevated in the prostate. PSA is generally not elevated in healthy men or men with non-prostatic carcinoma.      This test may exhibit interference when a sample is collected from a person who is consuming high dose of biotin (a.k.a., vitamin B7, vitamin H, coenzyme R) supplements resulting in serum concentrations >100                            ng/mL.  Intake of the recommended daily allowance (RDA) for biotin (0.03 mg) has not been shown to typically cause significant interference; however, high dose daily dietary supplements may contain biotin concentrations greater than 150 times (5-10 mg) the RDA.  It is recommended that physicians ask all patients who may be on biotin supplementation to stop biotin consumption at least 72 hours prior to collection of a new sample.      PSA, Free 03/15/2024 0.72  ng/mL Final    This test may exhibit interference when a sample is collected from a person who is consuming high dose of biotin (a.k.a., vitamin B7, vitamin H, coenzyme R) supplements resulting in serum concentrations >100 ng/mL.  Intake of the recommended daily allowance (RDA) for biotin (0.03 mg) has not been shown to typically cause significant  interference; however, high dose daily dietary supplements may contain biotin concentrations greater than 150 times (5-10 mg) the RDA.  It is recommended that physicians ask all patients who may be on biotin supplementation to stop biotin consumption at least 72 hours prior to collection of a new sample.      PSA, % Free 03/15/2024 26  % Final    INTERPRETIVE INFORMATION: % FREE PROSTATE SPECIFIC ANTIGEN:  Free PSA (FPSA) chemiluminescent (LOCI) immunoassay method is used in conjunction with the Siemens Total PSA(TPSA) chemiluminescent immunoassay method to determine the free PSA percentage (%FPSA).  Values obtained with different assay methods should not be used interchangeably.    The free PSA percentage is an aid in distinguishing prostate cancer from benign prostatic conditions in men age 50 and older with a total PSA between 4 and 10 ng/mL and digital rectal examination findings not suspicious for cancer.       Vitals:  Vitals:    03/18/24 1125   BP: 142/68   BP Location: Left arm   Patient Position: Sitting   Cuff Size: adult   Pulse: 64   Resp: 16   Temp: 97.7 °F (36.5 °C)   TempSrc: Temporal   SpO2: 97%   Weight: 148 lb (67.1 kg)     Body mass index is 23.18 kg/m².    Lipids and PSA noted to be ok        Assessment & Plan:   1. Benign essential HTN    2. Leg edema, right    3. Spinal stenosis of lumbar region with neurogenic claudication    4. Dyslipidemia    5. Elevated PSA      Mild HTN-pt concerned inc metoprolol a little  Edema gone, back ok  Lipids and PSA noted and discussed  RTC 6 mos CPE        Meds This Visit:  Requested Prescriptions     Signed Prescriptions Disp Refills    metoprolol succinate ER 25 MG Oral Tablet 24 Hr 135 tablet 3     Sig: Take 1 tablet (25 mg total) by mouth daily. Take 25 mg tablet in the am and 1/2 (12.5 mg) in the pm       Imaging & Referrals:  None

## 2024-04-10 RX ORDER — ERGOCALCIFEROL 1.25 MG/1
50000 CAPSULE ORAL
Qty: 6 CAPSULE | Refills: 3 | Status: SHIPPED | OUTPATIENT
Start: 2024-04-10

## 2024-04-10 RX ORDER — OMEPRAZOLE 40 MG/1
40 CAPSULE, DELAYED RELEASE ORAL DAILY
Qty: 90 CAPSULE | Refills: 3 | Status: SHIPPED | OUTPATIENT
Start: 2024-04-10

## 2024-04-10 RX ORDER — TAMSULOSIN HYDROCHLORIDE 0.4 MG/1
0.4 CAPSULE ORAL DAILY
Qty: 90 CAPSULE | Refills: 3 | Status: SHIPPED | OUTPATIENT
Start: 2024-04-10

## 2024-07-15 ENCOUNTER — TELEPHONE (OUTPATIENT)
Dept: INTERNAL MEDICINE CLINIC | Facility: CLINIC | Age: 84
End: 2024-07-15

## 2024-07-15 NOTE — TELEPHONE ENCOUNTER
Patient states that he is currently in [place] and he was in a garage and scratched his right arm a mecca nail. He states he would like to know when his last tetanus vaccine was.    Patient informed that his last tetanus was on 9/14/14.    He states that he will go to a clinic there and get a tetanus vaccine. He thanked me for the information.

## 2024-08-14 NOTE — ADDENDUM NOTE
Addended by: Andressa Lennon on: 10/29/2018 03:09 PM     Modules accepted: Level of Service
stretcher

## 2024-09-05 ENCOUNTER — NURSE ONLY (OUTPATIENT)
Dept: INTERNAL MEDICINE CLINIC | Facility: CLINIC | Age: 84
End: 2024-09-05
Payer: MEDICARE

## 2024-09-05 DIAGNOSIS — Z00.00 LABORATORY EXAMINATION ORDERED AS PART OF A ROUTINE GENERAL MEDICAL EXAMINATION: Primary | ICD-10-CM

## 2024-09-05 LAB
AMB EXT BILIRUBIN, TOTAL: 0.6 MG/DL
AMB EXT BUN: 15 MG/DL
AMB EXT CALCIUM: 9.6
AMB EXT CARBON DIOXIDE: 25
AMB EXT CHLORIDE: 99
AMB EXT CHOL/HDL RATIO: 2
AMB EXT CHOLESTEROL, TOTAL: 126 MG/DL
AMB EXT CMP ALT: 22 U/L (ref 9–46)
AMB EXT CMP AST: 32 U/L (ref 10–35)
AMB EXT CREATININE: 0.96 MG/DL
AMB EXT GLUCOSE: 89 MG/DL
AMB EXT HDL CHOLESTEROL: 64 MG/DL
AMB EXT HEMATOCRIT: 44.9 (ref 38.5–50)
AMB EXT HEMOGLOBIN: 15.2 (ref 13.2–17.1)
AMB EXT HGBA1C: 5.8 %
AMB EXT LDL CHOLESTEROL, DIRECT: 51 MG/DL
AMB EXT MCV: 97 (ref 80–100)
AMB EXT NON HDL CHOL: 62 MG/DL
AMB EXT PLATELETS: 188 (ref 140–400)
AMB EXT POSTASSIUM: 3.8 MMOL/L (ref 3.5–5.1)
AMB EXT PSA SCREEN: 3.08 NG/ML
AMB EXT SODIUM: 138 MMOL/L (ref 136–145)
AMB EXT TOTAL PROTEIN: 7.1 (ref 6.1–8)
AMB EXT TRIGLYCERIDES: 39 MG/DL
AMB EXT TSH: 4.23 MIU/ML
AMB EXT WBC: 6 X10(3)UL
BILIRUB UR QL STRIP.AUTO: NEGATIVE
CLARITY UR REFRACT.AUTO: CLEAR
COLOR UR AUTO: COLORLESS
GLUCOSE UR STRIP.AUTO-MCNC: NORMAL MG/DL
KETONES UR STRIP.AUTO-MCNC: NEGATIVE MG/DL
LEUKOCYTE ESTERASE UR QL STRIP.AUTO: NEGATIVE
NITRITE UR QL STRIP.AUTO: NEGATIVE
PH UR STRIP.AUTO: 7 [PH] (ref 5–8)
PROT UR STRIP.AUTO-MCNC: NEGATIVE MG/DL
RBC UR QL AUTO: NEGATIVE
SP GR UR STRIP.AUTO: 1 (ref 1–1.03)
UROBILINOGEN UR STRIP.AUTO-MCNC: NORMAL MG/DL

## 2024-09-05 PROCEDURE — 93923 UPR/LXTR ART STDY 3+ LVLS: CPT | Performed by: INTERNAL MEDICINE

## 2024-09-05 PROCEDURE — 99173 VISUAL ACUITY SCREEN: CPT | Performed by: INTERNAL MEDICINE

## 2024-09-05 PROCEDURE — 92551 PURE TONE HEARING TEST AIR: CPT | Performed by: INTERNAL MEDICINE

## 2024-09-05 PROCEDURE — 81003 URINALYSIS AUTO W/O SCOPE: CPT | Performed by: INTERNAL MEDICINE

## 2024-09-05 NOTE — PROGRESS NOTES
VENIPUNCTURE: left arm 1 stick landed    ADD-ON TEST:    EKG:    SPIROMETRY:    URINE: Yes      VISION: DR. Garcia upcoming appt     Right Eye 20/30      Left Eye 20/30     Both Eyes: 20/20      DEBRA:      Right Tibial Foot _164__ divided by highest arm __172_ = __1.04_       Right Dorsal Foot ___ divided by highest arm ___ = ___       Left Tibial Foot _172__ divided by highest arm _172__ = __1_       Left Dorsal Foot ___ divided by highest arm ___ = ___      ARM:   Right Brachial-172     Left Brachial-170      FOOT:   Right Tibial (Side)-164    Right Dorsal (Top)-      Left Tibial (Side)-172    Left Dorsal (Top)-      Greater than 1.3: results not reliable  1.01 to 1.3: correlated with history, especially if the patient has diabetes  0.97 to 1: normal  0.8 to 0.96: mild ischemia  0.4 to 0.79: moderate to severe ischemia  0.39 or less: severe ischemia; danger of limb loss

## 2024-09-17 ENCOUNTER — TELEPHONE (OUTPATIENT)
Dept: INTERNAL MEDICINE CLINIC | Facility: CLINIC | Age: 84
End: 2024-09-17

## 2024-09-17 DIAGNOSIS — I10 BENIGN ESSENTIAL HTN: Primary | ICD-10-CM

## 2024-09-17 RX ORDER — METOPROLOL SUCCINATE 25 MG/1
25 TABLET, EXTENDED RELEASE ORAL 2 TIMES DAILY
Qty: 180 TABLET | Refills: 3 | Status: CANCELLED | OUTPATIENT
Start: 2024-09-17

## 2024-09-17 NOTE — TELEPHONE ENCOUNTER
Patient saw his cardiologist DR Ac recently.     Patient states Dr Ac increased his Metoprolol     Was taking Metprolol  25mg in AM and 12.5mg in PM    Cardiology wants patient to increase Metoprolol to 25 mg BID    Patient requesting refill to [facility] in [place]     Order pend     To Dr De Leon    Patient has a lump on his right upper neck, sounds like a swollen lymph node.     Patient requesting appointment this week with Dr Taylor for evaluation.     Office visit made with Dr Taylor on Wednesday     Ranjana MCKEON

## 2024-09-17 NOTE — TELEPHONE ENCOUNTER
Patient would like to discuss some medication with the nurse.  The medication is metoprolol succinate ER 25 MG Oral Tablet 24 Hr .    Please call.

## 2024-09-18 ENCOUNTER — HOSPITAL ENCOUNTER (OUTPATIENT)
Dept: CT IMAGING | Facility: HOSPITAL | Age: 84
Discharge: HOME | End: 2024-09-18
Attending: INTERNAL MEDICINE
Payer: MEDICARE

## 2024-09-18 ENCOUNTER — TELEPHONE (OUTPATIENT)
Dept: INTERNAL MEDICINE CLINIC | Facility: CLINIC | Age: 84
End: 2024-09-18

## 2024-09-18 ENCOUNTER — OFFICE VISIT (OUTPATIENT)
Dept: INTERNAL MEDICINE CLINIC | Facility: CLINIC | Age: 84
End: 2024-09-18
Payer: MEDICARE

## 2024-09-18 VITALS
WEIGHT: 149.81 LBS | SYSTOLIC BLOOD PRESSURE: 138 MMHG | HEART RATE: 67 BPM | HEIGHT: 67 IN | BODY MASS INDEX: 23.51 KG/M2 | TEMPERATURE: 98 F | OXYGEN SATURATION: 96 % | RESPIRATION RATE: 16 BRPM | DIASTOLIC BLOOD PRESSURE: 70 MMHG

## 2024-09-18 DIAGNOSIS — K11.1 SUBMANDIBULAR GLAND HYPERTROPHY: ICD-10-CM

## 2024-09-18 DIAGNOSIS — K11.1 SUBMANDIBULAR GLAND HYPERTROPHY: Primary | ICD-10-CM

## 2024-09-18 LAB
CREAT BLD-MCNC: 1.2 MG/DL
EGFRCR SERPLBLD CKD-EPI 2021: 60 ML/MIN/1.73M2 (ref 60–?)

## 2024-09-18 PROCEDURE — 82565 ASSAY OF CREATININE: CPT

## 2024-09-18 PROCEDURE — 70491 CT SOFT TISSUE NECK W/DYE: CPT | Performed by: INTERNAL MEDICINE

## 2024-09-18 PROCEDURE — 99214 OFFICE O/P EST MOD 30 MIN: CPT | Performed by: INTERNAL MEDICINE

## 2024-09-18 RX ORDER — CLINDAMYCIN HCL 300 MG
300 CAPSULE ORAL 2 TIMES DAILY
Qty: 14 CAPSULE | Refills: 0 | Status: SHIPPED | OUTPATIENT
Start: 2024-09-18 | End: 2024-09-25

## 2024-09-18 RX ORDER — METOPROLOL SUCCINATE 25 MG/1
25 TABLET, EXTENDED RELEASE ORAL 2 TIMES DAILY
Qty: 180 TABLET | Refills: 3 | Status: SHIPPED | OUTPATIENT
Start: 2024-09-18

## 2024-09-18 NOTE — TELEPHONE ENCOUNTER
I spoke with Kenny.  Results of his CT scan are as follows:      Study Result    Narrative   PROCEDURE:  CT SOFT TISSUE OF NECK(CONTRAST ONLY) (CPT=70491)     COMPARISON:  None.     INDICATIONS:  K11.1 Submandibular gland hypertrophy     TECHNIQUE:  IV contrast-enhanced multislice CT scanning is performed through the neck soft tissues during administration of nonionic contrast.  Dose reduction techniques were used. Dose information is transmitted to the ACR (American College of  Radiology) NRDR (National Radiology Data Registry) which includes the Dose Index Registry.     PATIENT STATED HISTORY:(As transcribed by Technologist)   83 y/o gentleman who noticed swelling of the R submandibular gland on 9/14 after shaving.  The size is roughly at least 2 cm.  There is some tenderness.  No other neck masses felt.  Please  evaluate and r/o concern for any suspicious process.  Lump marked with lead markere.      CONTRAST USED:  50cc of Isovue 370     FINDINGS:  NASOPHARYNX:  Fossae of Rosenmuller and torus tubarius are symmetric.    ORAL CAVITY:  3 mm calculus in the right submandibular duct is noted.  Associated dilatation of the ductules in the right submandibular gland with enlargement of the right submandibular gland.  Adjacent inflammatory changes are noted.  OROPHARYNX:  Faucial and lingual tonsils are symmetric.    HYPOPHARYNX:  No mass or other visible lesion.    LARYNX:  The vocal cords are symmetric and without mass.    SINUSES:  Limited views show no significant fluid or mucosal thickening.    NECK GLANDS:  Gas in the right parotid gland and the right parotid duct is noted.  LYMPH NODES:  No pathological-appearing or enlarged lymph nodes.    SKULL BASE:  Foramina are symmetric without bony erosion.    VASCULATURE:  Limited views are unremarkable.    BONES:  Moderate osteoarthritic changes throughout the cervical spine.  OTHER:  No additional imaging findings.                     Impression   CONCLUSION:       1.  Sialolithiasis with a 3 mm calculus in the right submandibular duct.  Associated sialadenitis with enlargement of the right submandibular gland and dilatation of the ducts in the right submandibular gland.     2. Gas throughout the right parotid ducts along with the right main parotid duct may be iatrogenic.  Infection in this region is considered less likely but not excluded.          LOCATION:  [place]          Dictated by (CST): Alexander Strickland MD on 9/18/2024 at 11:00 AM      Finalized by (CST): Alexander Strickland MD on 9/18/2024 at 11:05 AM       Discussion took place on the diagnosis of sialadenitis.  We discussed also about the pathophysiology of formation of salivary gland stones.  At this point, the treatment is conservative in addition to antibiotics.  I did curbside my colleague Dr. Coombs from ENT.  Start clindamycin 300 mg twice daily x 7 days.  We also talked about hydration, sucking on sour candy, and even heat or gentle massage to the area to help dislodge the stone.  If he is still symptomatic at 1 week from now after completing antibiotics, he would benefit from an ENT evaluation for in-office stone removal consideration.  He thanked me for my assistance on his acute issue.  I told him that it was my pleasure to help as always.  He should follow-up with Dr. De Leon at his next regularly scheduled office visit.       Tino Taylor MD  [facility] Physician  Diplomate, American Board of Internal Medicine  Member, American College of Lifestyle Medicine  Member, American Association for Physician Leadership  [address], [address], [place] [address] [phone] (phone); [phone] (fax)  [email or web address]

## 2024-09-18 NOTE — PROGRESS NOTES
The 21st Century Cures Act makes medical notes like these available to patients in the interest of transparency. Please be advised this is a medical document. Medical documents are intended to carry relevant information, facts as evident, and the clinical opinion of the practitioner. The medical note is intended as peer to peer communication and may appear blunt or direct. It is written in medical language and may contain abbreviations or verbiage that are unfamiliar.             [facility] MEDICINE      Chief Complaint   Patient presents with    Swollen Glands     Swollen neck lymph nodes        HPI: Kenny presents today for evaluation of a large, subcutaneous, submandibular swelling that has been present since Saturday.  He noticed this after shaving.  No prior history.  He did have a dental cleaning done several days prior.  That went without any event.  He actually called his dentist yesterday and went back for repeat examination and was told everything was fine.  He denies any significant discomfort in the region of concern.  There are no superficial skin color changes where the swelling is localized.  He has no other neck masses.  He denies any fevers, dry mouth, or pain while eating.  He does report perhaps a bit of decreased saliva production.  He denies any other neck or upper chest symptoms.  He is here for next steps in management.    Review of Systems   All other systems reviewed and are negative.    Patient Active Problem List   Diagnosis    Polycystic kidney    Skin cancer    CAD (coronary artery disease)    History of migraine    Osteopenia    Carotid artery plaque    Dyslipidemia    Benign prostatic hyperplasia    Elevated lipoprotein(a)    Arteriosclerosis of thoracic aorta (HCC)    Benign essential HTN    Hiatal hernia with GERD    COPD (chronic obstructive pulmonary disease) (HCC)    Lumbar radiculopathy    Elevated C-reactive protein (CRP)    Bilateral cataracts    Lumbar spondylosis       Codeine,  Hctz [hydrochlorothiazide], Nsaids, Zofran [ondansetron], and Pepcid [famotidine]      Current Outpatient Medications:     metoprolol succinate ER 25 MG Oral Tablet 24 Hr, Take 1 tablet (25 mg total) by mouth in the morning and 1 tablet (25 mg total) before bedtime., Disp: 180 tablet, Rfl: 3    tamsulosin 0.4 MG Oral Cap, TAKE ONE CAPSULE BY MOUTH ONE TIME DAILY, Disp: 90 capsule, Rfl: 3    Omeprazole 40 MG Oral Capsule Delayed Release, Take 1 capsule (40 mg total) by mouth daily., Disp: 90 capsule, Rfl: 3    ergocalciferol 1.25 MG (13192 UT) Oral Cap, Take 1 capsule (50,000 Units total) by mouth every 14 (fourteen) days., Disp: 6 capsule, Rfl: 3    bumetanide (BUMEX) 1 MG Oral Tab, Take 1 mg on Mondays, Wednesdays and Fridays AM., Disp: , Rfl:     atorvastatin 40 MG Oral Tab, Take 1 tablet (40 mg total) by mouth nightly., Disp: 90 tablet, Rfl: 3    Apoaequorin (PREVAGEN) 10 MG Oral Cap, Take 1 tablet by mouth daily., Disp: , Rfl:     amLODIPine 5 MG Oral Tab, Take 1 tablet (5 mg total) by mouth at bedtime., Disp: , Rfl:     fluticasone propionate 50 MCG/ACT Inhalation Aerosol Powder, Breath Activated, Inhale 1 puff into the lungs as needed., Disp: 1 each, Rfl: 3    aspirin 81 MG Oral Tab EC, Take 1 tablet (81 mg total) by mouth every morning., Disp: 30 tablet, Rfl: 0    ascorbic acid 1000 MG Oral Tab, Take 0.5 tablets (500 mg total) by mouth 2 (two) times daily., Disp: , Rfl:     Selenium 100 MCG Oral Tab, Take 1 tablet (100 mcg total) by mouth every evening., Disp: 60 tablet, Rfl: 0    magnesium 250 MG Oral Tab, Take 1 tablet (250 mg total) by mouth every other day. Magnesium oxide 250mg every other day., Disp: 30 tablet, Rfl: 0    omega-3 fatty acids 1000 MG Oral Cap, Take 1,000 mg by mouth in the morning and 1,000 mg before bedtime., Disp: , Rfl:     Nutritional Supplements (JUICE PLUS FIBRE OR), Take 2 tablets by mouth in the morning, at noon, and at bedtime., Disp: , Rfl:     Zinc 50 MG Oral Tab, Take 1 tablet  by mouth one time., Disp: , Rfl:     Coenzyme Q10 (COQ10) 100 MG Oral Cap, Take 1 capsule by mouth at bedtime., Disp: , Rfl:     Glucosamine-Chondroit-Vit C-Mn (GLUCOSAMINE CHONDR 1500 COMPLX OR), Take 1 tablet by mouth 2 (two) times daily., Disp: , Rfl:     Calcium Carbonate-Vitamin D (CALTRATE 600+D OR), Take 1 tablet by mouth in the morning and 1 tablet before bedtime., Disp: , Rfl:     Social History     Socioeconomic History    Marital status: Single    Number of children: 0    Years of education: 16 college   Occupational History    Occupation: retired   Tobacco Use    Smoking status: Never    Smokeless tobacco: Never   Vaping Use    Vaping status: Never Used   Substance and Sexual Activity    Alcohol use: Not Currently     Comment: GLASS OF WINE WITH DINNER/ 4-5 nights week    Drug use: No    Sexual activity: Never   Other Topics Concern    Caffeine Concern Yes     Comment: some    Stress Concern No    Weight Concern No    Special Diet No    Exercise Yes     Comment: vigorous    Seat Belt Yes   Social History Narrative    Single    No kids    Retired sales (chemical sales to steel industry)    No tobacco, drugs    1 wine 5-6x/wk       PE:  /70 (BP Location: Left arm, Patient Position: Sitting, Cuff Size: adult)   Pulse 67   Temp 97.7 °F (36.5 °C) (Temporal)   Resp 16   Ht 5' 7\" (1.702 m)   Wt 149 lb 12.8 oz (67.9 kg)   SpO2 96%   BMI 23.46 kg/m²   Gen - A&Ox3, NAD  HEENT - PERRL, EOMI, OP is clear  Neck - supple, no JVD, no thyromegaly; there is submandibular gland hypertrophy that is about 2 cm in size; no palpable discomfort  Lymph - no palp LAD  Lungs - CTAB  Psych - nl mood/affect      A/P:  Encounter Diagnosis   Name Primary?    Submandibular gland hypertrophy Yes     Discussion took place.  This could easily be sialadenitis from a calculus versus something more concerning.  I would have him go for stat CT soft tissue neck protocol with IV contrast.  Testing ordered and he will get this done  today.  Follow-up management based on findings.  RTC as needed or next regularly scheduled office visit w/ Dr. De Leon.  He verbally agrees to and understands the plan as outlined above. He was also afforded the time and opportunity to ask questions, which were then answered to the best of my ability.        Tino Taylor MD  [facility] Physician  Diplomate, American Board of Internal Medicine  Member, American College of Lifestyle Medicine  Member, American Association for Physician Leadership  [address], [facility] [address], [place] [address]  [phone] (phone); [phone] (fax)  [email or web address]         Meds & Refills for this Visit:  Requested Prescriptions     Signed Prescriptions Disp Refills    metoprolol succinate ER 25 MG Oral Tablet 24 Hr 180 tablet 3     Sig: Take 1 tablet (25 mg total) by mouth in the morning and 1 tablet (25 mg total) before bedtime.       Imaging & Consults:  CT SOFT TISSUE OF NECK(CONTRAST ONLY) (CPT=70491)

## 2024-09-23 ENCOUNTER — TELEPHONE (OUTPATIENT)
Dept: INTERNAL MEDICINE CLINIC | Facility: CLINIC | Age: 84
End: 2024-09-23

## 2024-09-23 NOTE — TELEPHONE ENCOUNTER
Spoke with patient    Saw Dr Taylor last week for sialolithiasis of Right submandibular duct.     Patient states the swelling is definitely improved.     Is seeing Dr De Leon on Thursday this week.   Patient will be completed with Clindamycin on Wednesday     Dr De Leon will evaluate patient at that time and decide if further antibiotics are needed.     Patient agreeable     Ranjana MCKEON

## 2024-09-23 NOTE — TELEPHONE ENCOUNTER
Patient is requesting a refill for clindamycin 300 MG Oral Cap. He stated that he still has some swelling in his glands.     [id number] - [place], [place] - [address] [phone], [phone]

## 2024-09-25 NOTE — PROGRESS NOTES
1- Bone Dexa: 9/16/2021 [facility]     2- Colonoscopy: 10/2014 by Sebastian Ziegler Negative for Colon Cancer 9/2021    3- Dental: Periodic     4- Eye Exam: Dr. Garcia upcoming appt     5- Full Skin Exam: Dr. Harris     6- Heart Test:      7-  Strength:      8- Gait Speed:      9- Ankle Brachial Index:    ______________________________________________________________________     HSCRP: 1.4  Myeloperoxidase: Normal 225     LIPIDS:      TOTAL CHOL: Normal 126  HDL CHOL: Normal 64  TRIGLYCERIDES: Normal 39  LDL CHOL-C: Normal 51  CHOL/HDL-C: Normal 2.0  NON-HDL CHOL: Normal 62  APO B: Normal 59  LipoProtein (a):       GLUCOSE: Normal 89  HbA1C: 5.8  TMAO: Normal 3.5     VITAMIN B12: Normal 662     VITAMIN D: Normal 60.7     OMEGA CHECK: Normal 8.5     CMP:      TSH: Normal 4.23     PSA: Normal 3.080     CBC:      UA:     Audiometry done on  :    Hearing aids Y/N = N      500 Hz 1000Hz 2000Hz 4000Hz   Left Ear: 25 dB   N   Y   Y   Y     Left Ear:   40 dB Y Y Y Y   Right Ear: 25 dB   N   Y   Y   N     Right Ear:   40 dB Y Y Y Y

## 2024-09-26 ENCOUNTER — PATIENT MESSAGE (OUTPATIENT)
Dept: INTERNAL MEDICINE CLINIC | Facility: CLINIC | Age: 84
End: 2024-09-26

## 2024-09-26 ENCOUNTER — TELEPHONE (OUTPATIENT)
Dept: INTERNAL MEDICINE CLINIC | Facility: CLINIC | Age: 84
End: 2024-09-26

## 2024-09-26 ENCOUNTER — OFFICE VISIT (OUTPATIENT)
Dept: INTERNAL MEDICINE CLINIC | Facility: CLINIC | Age: 84
End: 2024-09-26
Payer: MEDICARE

## 2024-09-26 VITALS
HEART RATE: 61 BPM | SYSTOLIC BLOOD PRESSURE: 148 MMHG | BODY MASS INDEX: 23.33 KG/M2 | RESPIRATION RATE: 16 BRPM | HEIGHT: 67 IN | DIASTOLIC BLOOD PRESSURE: 60 MMHG | OXYGEN SATURATION: 98 % | WEIGHT: 148.63 LBS | TEMPERATURE: 98 F

## 2024-09-26 DIAGNOSIS — M43.06 LUMBAR SPONDYLOLYSIS: ICD-10-CM

## 2024-09-26 DIAGNOSIS — J44.9 CHRONIC OBSTRUCTIVE PULMONARY DISEASE, UNSPECIFIED COPD TYPE (HCC): ICD-10-CM

## 2024-09-26 DIAGNOSIS — Z13.820 SCREENING FOR OSTEOPOROSIS: ICD-10-CM

## 2024-09-26 DIAGNOSIS — M85.80 OSTEOPENIA, UNSPECIFIED LOCATION: ICD-10-CM

## 2024-09-26 DIAGNOSIS — G89.29 CHRONIC LOW BACK PAIN WITH SCIATICA, SCIATICA LATERALITY UNSPECIFIED, UNSPECIFIED BACK PAIN LATERALITY: ICD-10-CM

## 2024-09-26 DIAGNOSIS — M54.40 CHRONIC LOW BACK PAIN WITH SCIATICA, SCIATICA LATERALITY UNSPECIFIED, UNSPECIFIED BACK PAIN LATERALITY: ICD-10-CM

## 2024-09-26 DIAGNOSIS — Z00.00 ROUTINE GENERAL MEDICAL EXAMINATION AT A HEALTH CARE FACILITY: Primary | ICD-10-CM

## 2024-09-26 DIAGNOSIS — I70.0 ARTERIOSCLEROSIS OF THORACIC AORTA (HCC): ICD-10-CM

## 2024-09-26 DIAGNOSIS — R73.03 PREDIABETES: ICD-10-CM

## 2024-09-26 NOTE — TELEPHONE ENCOUNTER
From: Jose Angel Kincaid  To: Cl De Leon  Sent: 9/26/2024 3:35 PM CDT  Subject: EKG-12 LEAD    Dr De Leon,   I received a report on [employer] concerning  this morning’s EKG that I don’t know how to interpret.  There was 9 boxes, all said NO VALUE. Just let me  know if it was OK when you have a chance.    Thanks,  Jose Angel Kincaid  [birth year]

## 2024-09-26 NOTE — TELEPHONE ENCOUNTER
Spoke with patient  Patient requesting Dexa scan order be faxed to [facility]    Order faxed per patient request    Ranjana MCKEON

## 2024-09-27 NOTE — PROGRESS NOTES
Subjective:   Patient ID: Jose Angel Kincaid is a 84 year old male.CPE    Multiple minor issues        History/Other:   Review of Systems   Constitutional:  Negative for fatigue and fever.   HENT:          Swollen gland under right mandible-better after clindamycin   Eyes:         Cataracts removed both eyes   Respiratory: Negative.          Covid in 2022, hx pneumonia   Cardiovascular:  Negative for leg swelling (gone with reduced amlodipine).        HTN and lipids on meds-amlodipine reduced 5mg bid to every day, stent   Gastrointestinal:  Negative for abdominal pain, diarrhea, nausea and vomiting.        Reflux controlled on med, inguinal hernia repair, appendectomy     Endocrine: Negative.    Genitourinary:  Positive for difficulty urinating (BPH).        Renal cysts, BPH   Musculoskeletal:  Positive for arthralgias. Negative for back pain (gone since OR 3/2023) and gait problem.        Lumbar spinal stenosis-back OR 3/2023 after several injections did not help   Skin:         Hx multiple skin cancers   Neurological:  Negative for weakness.   Hematological: Negative.    Psychiatric/Behavioral: Negative.         Current Outpatient Medications   Medication Sig Dispense Refill    metoprolol succinate ER 25 MG Oral Tablet 24 Hr Take 1 tablet (25 mg total) by mouth in the morning and 1 tablet (25 mg total) before bedtime. 180 tablet 3    tamsulosin 0.4 MG Oral Cap TAKE ONE CAPSULE BY MOUTH ONE TIME DAILY 90 capsule 3    Omeprazole 40 MG Oral Capsule Delayed Release Take 1 capsule (40 mg total) by mouth daily. 90 capsule 3    ergocalciferol 1.25 MG (98292 UT) Oral Cap Take 1 capsule (50,000 Units total) by mouth every 14 (fourteen) days. 6 capsule 3    bumetanide (BUMEX) 1 MG Oral Tab Take 1 mg on Mondays, Wednesdays and Fridays AM.      atorvastatin 40 MG Oral Tab Take 1 tablet (40 mg total) by mouth nightly. 90 tablet 3    Apoaequorin (PREVAGEN) 10 MG Oral Cap Take 1 tablet by mouth daily.      amLODIPine 5 MG Oral  Tab Take 1 tablet (5 mg total) by mouth at bedtime.      fluticasone propionate 50 MCG/ACT Inhalation Aerosol Powder, Breath Activated Inhale 1 puff into the lungs as needed. 1 each 3    aspirin 81 MG Oral Tab EC Take 1 tablet (81 mg total) by mouth every morning. 30 tablet 0    ascorbic acid 1000 MG Oral Tab Take 0.5 tablets (500 mg total) by mouth 2 (two) times daily.      Selenium 100 MCG Oral Tab Take 1 tablet (100 mcg total) by mouth every evening. 60 tablet 0    magnesium 250 MG Oral Tab Take 1 tablet (250 mg total) by mouth every other day. Magnesium oxide 250mg every other day. 30 tablet 0    omega-3 fatty acids 1000 MG Oral Cap Take 1,000 mg by mouth in the morning and 1,000 mg before bedtime.      Nutritional Supplements (JUICE PLUS FIBRE OR) Take 2 tablets by mouth in the morning, at noon, and at bedtime.      Zinc 50 MG Oral Tab Take 1 tablet by mouth one time.      Coenzyme Q10 (COQ10) 100 MG Oral Cap Take 1 capsule by mouth at bedtime.      Glucosamine-Chondroit-Vit C-Mn (GLUCOSAMINE CHONDR 1500 COMPLX OR) Take 1 tablet by mouth 2 (two) times daily.      Calcium Carbonate-Vitamin D (CALTRATE 600+D OR) Take 1 tablet by mouth in the morning and 1 tablet before bedtime.       Allergies:  Allergies   Allergen Reactions    Codeine OTHER (SEE COMMENTS)     Throat  tightness    Hctz [Hydrochlorothiazide] UNKNOWN    Nsaids OTHER (SEE COMMENTS)     GI    Zofran [Ondansetron] OTHER (SEE COMMENTS)     Constipation     Pepcid [Famotidine] OTHER (SEE COMMENTS)     neck and shoulder pains       Objective:   Physical Exam  Constitutional:       General: He is not in acute distress.     Appearance: He is not ill-appearing.   HENT:      Head:      Comments: Mild swelling right submandibular gland-better     Right Ear: Tympanic membrane normal.      Left Ear: Tympanic membrane normal.      Mouth/Throat:      Pharynx: Oropharynx is clear.   Eyes:      Conjunctiva/sclera: Conjunctivae normal.      Pupils: Pupils are  equal, round, and reactive to light.   Cardiovascular:      Rate and Rhythm: Normal rate and regular rhythm.      Heart sounds: Normal heart sounds.   Pulmonary:      Effort: No respiratory distress.      Breath sounds: Normal breath sounds.   Abdominal:      Tenderness: There is no abdominal tenderness.   Genitourinary:     Prostate: Normal.      Rectum: Guaiac result negative.   Musculoskeletal:         General: No tenderness (lumbar incision).      Right lower leg: No edema.      Left lower leg: No edema.   Skin:     Comments: Multiple surgical scars   Neurological:      Mental Status: He is alert. Mental status is at baseline.      Motor: No weakness.      Gait: Gait normal.   Psychiatric:         Mood and Affect: Mood normal.       Active Problems:  Patient Active Problem List   Diagnosis    Polycystic kidney    Skin cancer    CAD (coronary artery disease)    History of migraine    Osteopenia    Carotid artery plaque    Dyslipidemia    Benign prostatic hyperplasia    Elevated lipoprotein(a)    Arteriosclerosis of thoracic aorta (HCC)    Benign essential HTN    Hiatal hernia with GERD    COPD (chronic obstructive pulmonary disease) (HCC)    Lumbar radiculopathy    Elevated C-reactive protein (CRP)    Bilateral cataracts    Lumbar spondylosis    Prediabetes       Past Medical History:  Past Medical History:    Arteriosclerosis of thoracic aorta (HCC)    CT incidental December 2019    Benign non-nodular prostatic hyperplasia with lower urinary tract symptoms    By imaging and modest symptoms     Bilateral cataracts    Formatting of this note might be different from the original.  Overview:   MODEST   -2014/2015    Bilateral plantar fasciitis    BPH (benign prostatic hypertrophy)    CAD (coronary artery disease)    LAD   STENTED   JONAS  -  9/2013 DR ZABALA  -  45%  LEFT   MAIN ALSO   SEEN   LV  45%      Carotid artery plaque     15-49%    9/2019     30% bilateral December 2017    Cataract    COPD (chronic  obstructive pulmonary disease) (HCC)    COVID-19    3 days of cough, runny nose, low saturations needing oxygen.    Elevated C-reactive protein (CRP)    Formatting of this note might be different from the original.  Overview:   September 2016 will repeat occurred during infection viral    Elevated lipoprotein(a)    Essential hypertension    Herpes    Hiatal hernia with GERD    History of migraine    EXERCISE X 4  SINCE 2012     Hyperlipidemia    Inguinal hernia    Lumbar radiculopathy    Surgery 3/2023    Mass of upper lobe of left lung    CT pending first week December 2019    OA (osteoarthritis)    hips and hands    Osteopenia    T     -2.1    2014     Pneumonia due to organism    Polycystic renal disease    Prediabetes    Skin cancer    New  Episode  Left  Cheek  -   9/2018  -    SQUAMOUS   NOSE.   F/U DR DEE  2X  YEARLY  -   WITH  RECENT   AK'S    Squamous cell carcinoma    nose x 3 and hand    Vitamin D deficiency       Past Surgical History:  Past Surgical History:   Procedure Laterality Date    Angioplasty (coronary)  09/2013 9/13 cath 40% ostial LM, 80-90% mid to distal LAD (2.5 x 26mm Resolute Integrity) EF 45%    Appendectomy  1954    Cataract      Colonoscopy  01/2004    Colonoscopy N/A 10/31/2014    Procedure: COLONOSCOPY;  Surgeon: Ken Vinson MD;  Location:  ENDOSCOPY    Hernia surgery  1997    right    Injection, anesthetic/steroid, paravertebral facet joint/nerve; lumbar/sacral, single level  04/06/2022    Parminder    Ir aspiration/injection  04/28/2022    Parminder    Lumbar spine surgery  03/08/2023        Skin surgery  01/08/2020    MOHS SCCIS Right mid posterior scalp    Skin surgery  02/11/2020    MOHS, BCC, right preauricular - Dr. ABA Harris       Family History:  Family History   Problem Relation Age of Onset    Pacemaker Mother     Hypertension Mother        Social History:  Social History     Socioeconomic History    Marital status: Single     Spouse name: Not on file    Number of  children: 0    Years of education: 16 college    Highest education level: Not on file   Occupational History    Occupation: retired   Tobacco Use    Smoking status: Never    Smokeless tobacco: Never   Vaping Use    Vaping status: Never Used   Substance and Sexual Activity    Alcohol use: Not Currently     Comment: GLASS OF WINE WITH DINNER/ 4-5 nights week    Drug use: No    Sexual activity: Never   Other Topics Concern     Service Not Asked    Blood Transfusions Not Asked    Caffeine Concern Yes     Comment: some    Occupational Exposure Not Asked    Hobby Hazards Not Asked    Sleep Concern Not Asked    Stress Concern No    Weight Concern No    Special Diet No    Back Care Not Asked    Exercise Yes     Comment: vigorous    Bike Helmet Not Asked    Seat Belt Yes    Self-Exams Not Asked   Social History Narrative    Single    No kids    Retired sales (chemical sales to steel industry)    No tobacco, drugs    1 wine 5-6x/wk     Social Determinants of Health     Financial Resource Strain: Not on file   Food Insecurity: Not on file   Transportation Needs: Not on file   Physical Activity: Not on file   Stress: Not on file   Social Connections: Not on file   Housing Stability: Not on file       Health Maintenance:    Immunization History   Administered Date(s) Administered    Covid-19 Vaccine Pfizer 30 mcg/0.3 ml 02/02/2021, 02/23/2021, 09/24/2021, 05/13/2022    Covid-19 Vaccine Pfizer Bivalent 30mcg/0.3mL 09/14/2022    Covid-19 Vaccine Pfizer Juan-Sucrose 30 mcg/0.3 ml 05/13/2022    DTAP 09/14/2014    FLU VAC High Dose 65 YRS & Older PRSV Free (50380) 10/08/2014, 10/13/2015, 09/23/2019, 09/10/2020, 10/23/2021, 10/10/2022    FLUAD High Dose 65 yr and older (94819) 10/09/2017, 09/24/2018    FLUZONE 6 months and older PFS 0.5 ml (77332) 09/10/2020    Fluzone Vaccine Medicare () 10/08/2014, 10/13/2015, 10/24/2016, 09/23/2019    High Dose Fluzone Influenza Vaccine, 65yr+ PF 0.5mL (14681) 10/24/2016    Influenza  09/14/2009, 10/18/2012, 10/11/2013    Pneumococcal (Prevnar 13) 09/14/2015    Pneumovax 23 09/08/2005    RSV, recombinant, RSVpreF, adjuvanted (Arexvy) 11/29/2023    TDAP 09/14/2014, 06/05/2019    Tetanus 09/15/2006    Zoster Vaccine Live (Zostavax) 01/01/2008    Zoster Vaccine Recombinant Adjuvanted (Shingrix) 10/17/2019, 02/22/2020       Vitals:  Vitals:    09/26/24 0904 09/26/24 0938   BP: 146/64 148/60   BP Location: Left arm Left arm   Patient Position: Sitting Sitting   Cuff Size: adult adult   Pulse: 61    Resp: 16    Temp: 98.1 °F (36.7 °C)    TempSrc: Temporal    SpO2: 98%    Weight: 148 lb 9.6 oz (67.4 kg)    Height: 5' 7\" (1.702 m)      Body mass index is 23.27 kg/m².      Health Screens    Anxiety Index: No major anxiety   Depression Index: No major depression   Cage Questionnaire: No indication of alcohol abuse  Sleepiness Index: Normal  Male Sexual Health Inventory: N/A  Metabolic syndrome (hypertension, lipid abnormality, obesity, pre-diabetic): Present  Mini-Mental Examination: Normal in conversation  Nutritional Screen: Reviewed and discussed   SAFE Questions (Domestic Violence): Negative  Exercise: Active  Advance Directives:   (Scanned document on file)  Lookin Body: Wt 149  BMI  23  PBF 16 all good          1- Bone Dexa: 9/16/2021 [id number]     2- Colonoscopy: 10/2014 by Sebastian Ziegler Negative for Colon Cancer 9/2021 redo     3- Dental: Periodic     4- Eye Exam: Dr. Garcia upcoming appt     5- Full Skin Exam: Dr. Hraris     6- Heart Test:   Normal EKG  Normal Lexiscan 9/2022 to be redone 10/2024     7- , Gait Speed, leg pressures normal    8- Other:  CT neck 9/2024-infected gland-no repeat  CXR 1/2024 normal no repeat  US kidney cysts 10/2020 no repeat  US carotids 9/2021 mild plaque redo 9/2025  UGI 3/2021: Hiatal hernia with reflux no repeat      ______________________________________________________________________     Inflammation:  HSCRP:  1.4  Myeloperoxidase: Normal 225       LIPIDS:      TOTAL CHOL: Normal 126  HDL CHOL: Normal 64  TRIGLYCERIDES: Normal 39  LDL CHOL-C: Normal 51  CHOL/HDL-C: Normal 2.0  NON-HDL CHOL: Normal 62  APO B: Normal 59  LipoProtein (a):  Elevated in past-genetic-not repeated       GLUCOSE: Normal 89  HbA1C: 5.8  Prediabetic-new    TMAO: Normal 3.5     VITAMIN B12: Normal 662     VITAMIN D: Normal 60.7     OMEGA CHECK: Normal 8.5     CMP: Normal     TSH: Normal 4.23     PSA: Normal 3.080     CBC: Normal     UA: Normal     Audiometry done on  :     Hearing aids Y/N = N         500 Hz 1000Hz 2000Hz 4000Hz   Left Ear: 25 dB    N    Y    Y    Y      Left Ear:    40 dB Y Y Y Y   Right Ear: 25 dB    N    Y    Y    N      Right Ear:    40 dB Y Y Y Y                   Assessment & Plan:   1. Routine general medical examination at a health care facility    2. Osteopenia, unspecified location    3. Prediabetes    4. Screening for osteoporosis    5. Lumbar spondylolysis    6. Chronic low back pain with sciatica, sciatica laterality unspecified, unspecified back pain laterality    7. Chronic obstructive pulmonary disease, unspecified COPD type (HCC)    8. Arteriosclerosis of thoracic aorta (HCC)        Orders Placed This Encounter   Procedures    Hemoglobin A1C [E]       Meds This Visit:  Requested Prescriptions      No prescriptions requested or ordered in this encounter       Imaging & Referrals:  EKG 12-LEAD  XR DEXA BONE DENSITOMETRY (CPT=77080)           SUMMARY:       Mr Kincaid nice to see you. You are in [email or web address] for age 84 with HTN, non-smoker, Lipids, ar, Weight, Omegas, special markers MPO andTMAO all in good shape. Mild inc CRP-ignore. Thoracic aortic plaque common for your age-maintain good lipid control. Nuclear stress 9/2022 to be repeated (hx coronary stent 2013). Has had skin cancers, no sign of other cancers.  Cologard screen for colon cancer 9/2021 negative-repeat and hold on colonoscopy if negative again.   Elevated lipo(a) shows genetic risk for heart disease. No lung cancer on CXR 1/2024. COPD by xray no symptoms no treatment. PSA and prostate exam normal. Recommend Covid and Flushot 2 weeks apart-RSV done last year still ok. . Please stay on your current prescriptions and supplements. New problem is elevated A1C  prediabetes-unusual with you at ideal body weight. Cut down starch and recheck in 3mos.

## 2024-09-30 ENCOUNTER — TELEPHONE (OUTPATIENT)
Dept: INTERNAL MEDICINE CLINIC | Facility: CLINIC | Age: 84
End: 2024-09-30

## 2024-09-30 NOTE — TELEPHONE ENCOUNTER
Cologkeerthi order form faxed to Sebastian per Dr De Leon request.     Fax: [phone]    [employer] message sent to lien Chilel RN

## 2024-10-01 ENCOUNTER — TELEPHONE (OUTPATIENT)
Dept: INTERNAL MEDICINE CLINIC | Facility: CLINIC | Age: 84
End: 2024-10-01

## 2024-10-01 NOTE — TELEPHONE ENCOUNTER
Spoke with patient regarding Dexa Scan results.  Results sent for scanning from [facility].    Plans to take Caltrate Monday, Wednesday, Friday beginning again in the next few weeks.    Routed to PCP as BHAVYA

## 2024-10-08 LAB — AMB EXT COLOGUARD RESULT: NEGATIVE

## 2024-10-14 ENCOUNTER — TELEPHONE (OUTPATIENT)
Dept: INTERNAL MEDICINE CLINIC | Facility: CLINIC | Age: 84
End: 2024-10-14

## 2024-10-14 NOTE — TELEPHONE ENCOUNTER
Fax received 10/14/2024 with results from [employer] testing.  Report from [employer] should Negative stool testing results.    [phone]   Spoke with patient  Advised of negative test results.  Patient informed and verbalized understanding.    Faxed test results sent for scanning

## 2024-10-18 ENCOUNTER — PATIENT MESSAGE (OUTPATIENT)
Dept: INTERNAL MEDICINE CLINIC | Facility: CLINIC | Age: 84
End: 2024-10-18

## 2024-10-28 ENCOUNTER — HOSPITAL ENCOUNTER (OUTPATIENT)
Dept: CV DIAGNOSTICS | Facility: HOSPITAL | Age: 84
Discharge: HOME | End: 2024-10-28
Attending: INTERNAL MEDICINE
Payer: MEDICARE

## 2024-10-28 DIAGNOSIS — R06.09 DYSPNEA ON EXERTION: ICD-10-CM

## 2024-10-28 DIAGNOSIS — I25.10 CAD (CORONARY ARTERY DISEASE): ICD-10-CM

## 2024-10-28 PROCEDURE — 78452 HT MUSCLE IMAGE SPECT MULT: CPT | Performed by: INTERNAL MEDICINE

## 2024-10-28 PROCEDURE — 93018 CV STRESS TEST I&R ONLY: CPT | Performed by: INTERNAL MEDICINE

## 2024-10-28 PROCEDURE — 93017 CV STRESS TEST TRACING ONLY: CPT | Performed by: INTERNAL MEDICINE

## 2024-10-28 RX ORDER — REGADENOSON 0.08 MG/ML
INJECTION, SOLUTION INTRAVENOUS
Status: COMPLETED
Start: 2024-10-28 | End: 2024-10-28

## 2024-10-28 RX ADMIN — REGADENOSON 0.4 MG: 0.08 INJECTION, SOLUTION INTRAVENOUS at 10:45:00

## 2024-11-06 ENCOUNTER — PATIENT MESSAGE (OUTPATIENT)
Dept: INTERNAL MEDICINE CLINIC | Facility: CLINIC | Age: 84
End: 2024-11-06

## 2024-11-06 RX ORDER — BUMETANIDE 1 MG/1
TABLET ORAL
Qty: 90 TABLET | Refills: 3 | Status: SHIPPED | OUTPATIENT
Start: 2024-11-06

## 2024-12-05 ENCOUNTER — PATIENT MESSAGE (OUTPATIENT)
Dept: INTERNAL MEDICINE CLINIC | Facility: CLINIC | Age: 84
End: 2024-12-05

## 2024-12-06 NOTE — TELEPHONE ENCOUNTER
Dr De Leon advised the following:  Serum calcium is worthless in predicting the level of calcium in the bones. Best is DEXA which he had in Sept and was surprisingly BETTER in the face of advancing age. I like compromising     [employer] message sent to advise patient

## 2024-12-10 ENCOUNTER — LAB ENCOUNTER (OUTPATIENT)
Dept: LAB | Age: 84
End: 2024-12-10
Attending: INTERNAL MEDICINE
Payer: MEDICARE

## 2024-12-10 DIAGNOSIS — R73.03 PREDIABETES: ICD-10-CM

## 2024-12-10 LAB
EST. AVERAGE GLUCOSE BLD GHB EST-MCNC: 114 MG/DL (ref 68–126)
HBA1C MFR BLD: 5.6 % (ref ?–5.7)

## 2024-12-10 PROCEDURE — 36415 COLL VENOUS BLD VENIPUNCTURE: CPT

## 2024-12-10 PROCEDURE — 83036 HEMOGLOBIN GLYCOSYLATED A1C: CPT

## 2025-01-03 ENCOUNTER — PATIENT MESSAGE (OUTPATIENT)
Dept: INTERNAL MEDICINE CLINIC | Facility: CLINIC | Age: 85
End: 2025-01-03

## 2025-01-06 ENCOUNTER — OFFICE VISIT (OUTPATIENT)
Dept: INTERNAL MEDICINE CLINIC | Facility: CLINIC | Age: 85
End: 2025-01-06
Payer: MEDICARE

## 2025-01-06 VITALS
HEART RATE: 72 BPM | TEMPERATURE: 98 F | SYSTOLIC BLOOD PRESSURE: 140 MMHG | BODY MASS INDEX: 23.63 KG/M2 | DIASTOLIC BLOOD PRESSURE: 60 MMHG | HEIGHT: 66 IN | OXYGEN SATURATION: 99 % | WEIGHT: 147 LBS

## 2025-01-06 DIAGNOSIS — I49.9 CARDIAC ARRHYTHMIA, UNSPECIFIED CARDIAC ARRHYTHMIA TYPE: ICD-10-CM

## 2025-01-06 DIAGNOSIS — I25.10 CORONARY ARTERY DISEASE INVOLVING NATIVE CORONARY ARTERY OF NATIVE HEART WITHOUT ANGINA PECTORIS: ICD-10-CM

## 2025-01-06 DIAGNOSIS — R00.2 PALPITATION: Primary | ICD-10-CM

## 2025-01-06 DIAGNOSIS — Z95.5 STATUS POST CORONARY ARTERY STENT PLACEMENT: ICD-10-CM

## 2025-01-06 LAB
ATRIAL RATE: 62 BPM
P AXIS: 79 DEGREES
P-R INTERVAL: 172 MS
Q-T INTERVAL: 396 MS
QRS DURATION: 84 MS
QTC CALCULATION (BEZET): 401 MS
R AXIS: 65 DEGREES
T AXIS: 66 DEGREES
VENTRICULAR RATE: 62 BPM

## 2025-01-06 NOTE — TELEPHONE ENCOUNTER
Spoke with patient  States that he would like to come in  States that since coming home from holidays he has been having palpitations.  HR is 85-90 after walking.  Is not a normal thing for him.  Denies shortness of breath, chest pain.  HX stent in 2013  Does not notice at rest.  Blood pressure 133/80  Appointment scheduled.  Advised to be seen in ER with Chest Pain, severe dizziness.    Future Appointments   Date Time Provider Department Center   1/6/2025  2:30 PM Cl De Leon MD [facility] [place]

## 2025-01-06 NOTE — PROGRESS NOTES
Subjective:   Patient ID: Jose Angel Kincaid is a 84 year old male.intermittent palpitations over holidays    CAD post stent 2013 doing well except for \"skipped\" heartbeats with holiday eating and alcohol.         History/Other:   Review of Systems   Constitutional: Negative.    HENT: Negative.     Eyes:         Cataracts removed both eyes   Respiratory:  Negative for shortness of breath.         Covid in 2022, hx pneumonia   Cardiovascular:  Positive for palpitations. Negative for chest pain and leg swelling.        HTN and lipids on meds-amlodipine reduced 5mg bid to every day, stent   Gastrointestinal:  Negative for abdominal pain, diarrhea, nausea and vomiting.        Reflux controlled on med, inguinal hernia repair, appendectomy     Endocrine: Negative.    Genitourinary:  Positive for difficulty urinating (BPH).        Renal cysts, BPH   Musculoskeletal:  Positive for arthralgias. Negative for back pain (gone since OR 3/2023) and gait problem.        Lumbar spinal stenosis-back OR 3/2023 doing well   Skin:         Hx multiple skin cancers   Neurological:  Negative for weakness.   Hematological: Negative.    Psychiatric/Behavioral: Negative.       Current Outpatient Medications   Medication Sig Dispense Refill    bumetanide (BUMEX) 1 MG Oral Tab One daily am 90 tablet 3    metoprolol succinate ER 25 MG Oral Tablet 24 Hr Take 1 tablet (25 mg total) by mouth in the morning and 1 tablet (25 mg total) before bedtime. 180 tablet 3    tamsulosin 0.4 MG Oral Cap TAKE ONE CAPSULE BY MOUTH ONE TIME DAILY 90 capsule 3    Omeprazole 40 MG Oral Capsule Delayed Release Take 1 capsule (40 mg total) by mouth daily. 90 capsule 3    ergocalciferol 1.25 MG (42790 UT) Oral Cap Take 1 capsule (50,000 Units total) by mouth every 14 (fourteen) days. 6 capsule 3    atorvastatin 40 MG Oral Tab Take 1 tablet (40 mg total) by mouth nightly. 90 tablet 3    Apoaequorin (PREVAGEN) 10 MG Oral Cap Take 1 tablet by mouth daily.      amLODIPine  5 MG Oral Tab Take 1 tablet (5 mg total) by mouth at bedtime.      fluticasone propionate 50 MCG/ACT Inhalation Aerosol Powder, Breath Activated Inhale 1 puff into the lungs as needed. 1 each 3    aspirin 81 MG Oral Tab EC Take 1 tablet (81 mg total) by mouth every morning. 30 tablet 0    ascorbic acid 1000 MG Oral Tab Take 0.5 tablets (500 mg total) by mouth 2 (two) times daily.      Selenium 100 MCG Oral Tab Take 1 tablet (100 mcg total) by mouth every evening. 60 tablet 0    magnesium 250 MG Oral Tab Take 1 tablet (250 mg total) by mouth every other day. Magnesium oxide 250mg every other day. 30 tablet 0    omega-3 fatty acids 1000 MG Oral Cap Take 1,000 mg by mouth daily.      Nutritional Supplements (JUICE PLUS FIBRE OR) Take 2 tablets by mouth in the morning, at noon, and at bedtime.      Zinc 50 MG Oral Tab Take 1 tablet by mouth one time.      Coenzyme Q10 (COQ10) 100 MG Oral Cap Take 1 capsule by mouth at bedtime.      Glucosamine-Chondroit-Vit C-Mn (GLUCOSAMINE CHONDR 1500 COMPLX OR) Take 1 tablet by mouth 2 (two) times daily.      Calcium Carbonate-Vitamin D (CALTRATE 600+D OR) Take 1 tablet by mouth every other day.       Allergies:Allergies[1]    Objective:   Physical Exam  Constitutional:       General: He is not in acute distress.     Appearance: He is not ill-appearing.   HENT:      Right Ear: Tympanic membrane normal.      Left Ear: Tympanic membrane normal.      Mouth/Throat:      Pharynx: Oropharynx is clear.   Eyes:      Conjunctiva/sclera: Conjunctivae normal.      Pupils: Pupils are equal, round, and reactive to light.   Cardiovascular:      Rate and Rhythm: Normal rate and regular rhythm.      Heart sounds: Normal heart sounds.   Pulmonary:      Effort: No respiratory distress.      Breath sounds: Normal breath sounds.   Abdominal:      Tenderness: There is no abdominal tenderness.   Genitourinary:     Prostate: Normal.      Rectum: Guaiac result negative.   Musculoskeletal:         General:  No tenderness (lumbar incision).      Right lower leg: No edema.      Left lower leg: No edema.   Skin:     Comments: Multiple surgical scars   Neurological:      Mental Status: He is alert. Mental status is at baseline.      Motor: No weakness.      Gait: Gait normal.   Psychiatric:         Mood and Affect: Mood normal.       Active Problems:  Patient Active Problem List   Diagnosis    Polycystic kidney    Skin cancer    CAD (coronary artery disease)    History of migraine    Osteopenia    Carotid artery plaque    Dyslipidemia    Benign prostatic hyperplasia    Elevated lipoprotein(a)    Arteriosclerosis of thoracic aorta (HCC)    Benign essential HTN    Hiatal hernia with GERD    COPD (chronic obstructive pulmonary disease) (HCC)    Lumbar radiculopathy    Elevated C-reactive protein (CRP)    Bilateral cataracts    Lumbar spondylosis    Prediabetes       Past Medical History:  Past Medical History:    Arteriosclerosis of thoracic aorta (HCC)    CT incidental December 2019    Benign non-nodular prostatic hyperplasia with lower urinary tract symptoms    By imaging and modest symptoms     Bilateral cataracts    Formatting of this note might be different from the original.  Overview:   MODEST   -2014/2015    Bilateral plantar fasciitis    BPH (benign prostatic hypertrophy)    CAD (coronary artery disease)    LAD   STENTED   JONAS  -  9/2013 DR ZABALA  -  45%  LEFT   MAIN ALSO   SEEN   LV  45%      Carotid artery plaque     15-49%    9/2019     30% bilateral December 2017    Cataract    COPD (chronic obstructive pulmonary disease) ([facility])    COVID-19    3 days of cough, runny nose, low saturations needing oxygen.    Elevated C-reactive protein (CRP)    Formatting of this note might be different from the original.  Overview:   September 2016 will repeat occurred during infection viral    Elevated lipoprotein(a)    Essential hypertension    Herpes    Hiatal hernia with GERD    History of migraine    EXERCISE X 4  SINCE 2012      Hyperlipidemia    Inguinal hernia    Lumbar radiculopathy    Surgery 3/2023    Mass of upper lobe of left lung    CT pending first week December 2019    OA (osteoarthritis)    hips and hands    Osteopenia    T     -2.1    2014     Pneumonia due to organism    Polycystic renal disease    Prediabetes    Skin cancer    New  Episode  Left  Cheek  -   9/2018  -    SQUAMOUS   NOSE.   F/U DR DEE  2X  YEARLY  -   WITH  RECENT   AK'S    Squamous cell carcinoma    nose x 3 and hand    Vitamin D deficiency       Past Surgical History:  Past Surgical History:   Procedure Laterality Date    Angioplasty (coronary)  09/2013 9/13 cath 40% ostial LM, 80-90% mid to distal LAD (2.5 x 26mm Resolute Integrity) EF 45%    Appendectomy  1954    Cataract      Colonoscopy  01/2004    Colonoscopy N/A 10/31/2014    Procedure: COLONOSCOPY;  Surgeon: Ken Vinson MD;  Location:  ENDOSCOPY    Hernia surgery  1997    right    Injection, anesthetic/steroid, paravertebral facet joint/nerve; lumbar/sacral, single level  04/06/2022    Parminder    Ir aspiration/injection  04/28/2022    Parminder    Lumbar spine surgery  03/08/2023        Skin surgery  01/08/2020    MOHS SCCIS Right mid posterior scalp    Skin surgery  02/11/2020    MOHS, BCC, right preauricular - Dr. ABA Harris       Family History:  Family History   Problem Relation Age of Onset    Pacemaker Mother     Hypertension Mother        Social History:  Social History     Socioeconomic History    Marital status: Single     Spouse name: Not on file    Number of children: 0    Years of education: 16 college    Highest education level: Not on file   Occupational History    Occupation: retired   Tobacco Use    Smoking status: Never    Smokeless tobacco: Never   Vaping Use    Vaping status: Never Used   Substance and Sexual Activity    Alcohol use: Yes     Comment: occ beer    Drug use: No    Sexual activity: Never   Other Topics Concern     Service Not Asked    Blood  Transfusions Not Asked    Caffeine Concern Yes     Comment: some    Occupational Exposure Not Asked    Hobby Hazards Not Asked    Sleep Concern Not Asked    Stress Concern No    Weight Concern No    Special Diet No    Back Care Not Asked    Exercise Yes     Comment: vigorous    Bike Helmet Not Asked    Seat Belt Yes    Self-Exams Not Asked   Social History Narrative    Single    No kids    Retired sales (chemical sales to steel industry)    No tobacco, drugs    1 wine 5-6x/wk     Social Drivers of Health     Financial Resource Strain: Not on file   Food Insecurity: Not on file   Transportation Needs: Not on file   Physical Activity: Not on file   Stress: Not on file   Social Connections: Not on file   Housing Stability: Not on file       Health Maintenance:    Immunization History   Administered Date(s) Administered    Covid-19 Vaccine Pfizer 30 mcg/0.3 ml 02/02/2021, 02/23/2021, 09/24/2021, 05/13/2022    Covid-19 Vaccine Pfizer Bivalent 30mcg/0.3mL 09/14/2022    Covid-19 Vaccine Pfizer Juan-Sucrose 30 mcg/0.3 ml 05/13/2022    DTAP 09/14/2014    FLU VAC High Dose 65 YRS & Older PRSV Free (06755) 10/08/2014, 10/13/2015, 09/23/2019, 09/10/2020, 10/23/2021, 10/10/2022, 09/12/2023    FLUAD High Dose 65 yr and older (82944) 10/09/2017, 09/24/2018    FLUZONE 6 months and older PFS 0.5 ml (01814) 09/10/2020    Fluzone Vaccine Medicare () 10/08/2014, 10/13/2015, 10/24/2016, 09/23/2019    High Dose Fluzone Influenza Vaccine, 65yr+ PF 0.5mL (99316) 10/24/2016, 10/17/2024    Influenza 09/14/2009, 10/18/2012, 10/11/2013    Pfizer Covid-19 Vaccine 30mcg/0.3ml 12yrs+ 10/12/2023, 09/26/2024    Pneumococcal (Prevnar 13) 09/14/2015    Pneumovax 23 09/08/2005    RSV, recombinant, RSVpreF, adjuvanted (Arexvy) 11/29/2023    TDAP 09/14/2014, 06/05/2019, 07/15/2024    Tetanus 09/15/2006    Zoster Vaccine Live (Zostavax) 01/01/2008    Zoster Vaccine Recombinant Adjuvanted (Shingrix) 10/17/2019, 02/22/2020       Labs and  Tests:  Patient Active Problem List   Component Date Value Ref Range Status    HgbA1C 12/10/2024 5.6  <5.7 % Final     Normal HbA1C:     <5.7%      Pre-Diabetic:     5.7 - 6.4%      Diabetic:         >6.4%      Diabetic Control: <7.0%        Estimated Average Glucose 12/10/2024 114  68 - 126 mg/dL Final    eAG is the estimated average glucose calculated from Hgb A1c according to the formula recommended by the American Diabetes Association. eAG levels reflect the long term average glucose and may not correlate with random or fasting glucose levels since these represent specific points in time.              Vitals:  Vitals:    01/06/25 1431   BP: 140/60   BP Location: Right arm   Patient Position: Sitting   Cuff Size: adult   Pulse: 72   Temp: 98.2 °F (36.8 °C)   SpO2: 99%   Weight: 147 lb (66.7 kg)   Height: 5' 6\" (1.676 m)     Body mass index is 23.73 kg/m².    Normal EKG today and normal Lexiscan 10/2024    Assessment & Plan:   1. Palpitation    2. Cardiac arrhythmia, unspecified cardiac arrhythmia type    3. Coronary artery disease involving native coronary artery of native heart without angina pectoris    4. Status post coronary artery stent placement      Low risk palpitations prob isolated PACs and PVCs not captured today  Reassured recommended no f/u testing but pt has some anxiety over this and I recommend echo and/or 7day monitor  He wants to start with echo first  Continue metoprolol, atorvastatin, and ASA 81   RTC on will call for ongoing care of heart disease    Meds This Visit:  Requested Prescriptions      No prescriptions requested or ordered in this encounter       Imaging & Referrals:  ELECTROCARDIOGRAM, COMPLETE  CARD ECHO 2D DOPPLER (CPT=93306)         [1]   Allergies  Allergen Reactions    Codeine OTHER (SEE COMMENTS)     Throat  tightness    Hctz [Hydrochlorothiazide] UNKNOWN    Nsaids OTHER (SEE COMMENTS)     GI    Zofran [Ondansetron] OTHER (SEE COMMENTS)     Constipation     Pepcid  [Famotidine] OTHER (SEE COMMENTS)     neck and shoulder pains

## 2025-01-13 ENCOUNTER — HOSPITAL ENCOUNTER (OUTPATIENT)
Dept: CV DIAGNOSTICS | Facility: HOSPITAL | Age: 85
Discharge: HOME | End: 2025-01-13
Attending: INTERNAL MEDICINE
Payer: MEDICARE

## 2025-01-13 DIAGNOSIS — I49.9 CARDIAC ARRHYTHMIA, UNSPECIFIED CARDIAC ARRHYTHMIA TYPE: ICD-10-CM

## 2025-01-13 DIAGNOSIS — R00.2 PALPITATION: ICD-10-CM

## 2025-01-13 PROCEDURE — 93306 TTE W/DOPPLER COMPLETE: CPT | Performed by: INTERNAL MEDICINE

## 2025-01-14 ENCOUNTER — PATIENT MESSAGE (OUTPATIENT)
Dept: INTERNAL MEDICINE CLINIC | Facility: CLINIC | Age: 85
End: 2025-01-14

## 2025-01-14 ENCOUNTER — TELEPHONE (OUTPATIENT)
Dept: INTERNAL MEDICINE CLINIC | Facility: CLINIC | Age: 85
End: 2025-01-14

## 2025-01-14 NOTE — TELEPHONE ENCOUNTER
Future Appointments   Date Time Provider Department Center   1/15/2025  3:45 PM Cl De Leon MD [facility] [place]     Patient is scheduled for a phone appt with PCP    Responded to Patient letting him know he is scheduled for a phone visit

## 2025-01-15 ENCOUNTER — VIRTUAL PHONE E/M (OUTPATIENT)
Dept: INTERNAL MEDICINE CLINIC | Facility: CLINIC | Age: 85
End: 2025-01-15
Payer: MEDICARE

## 2025-01-15 DIAGNOSIS — R00.2 PALPITATION: Primary | ICD-10-CM

## 2025-01-15 DIAGNOSIS — I25.10 CORONARY ARTERY DISEASE INVOLVING NATIVE CORONARY ARTERY OF NATIVE HEART WITHOUT ANGINA PECTORIS: ICD-10-CM

## 2025-01-15 PROCEDURE — G2252 BRIEF CHKIN BY MD/QHP, 11-20: HCPCS | Performed by: INTERNAL MEDICINE

## 2025-01-15 NOTE — PROGRESS NOTES
Virtual Telephone Check-In    Jose Angel Kincaid verbally consents to a Virtual/Telephone Check-In visit on 01/15/25.  Patient has been referred to the [facility] website at [email or web address] to review the yearly Consent to Treat document.    Patient understands and accepts financial responsibility for any deductible, co-insurance and/or co-pays associated with this service.    Duration of the service: 15 minutes audio only      Summary of topics discussed:     His concern was recent bout of palpitations. Had Normal Lexiscan 10/2024, normal EKG 1/2025, recent echo mild valvular disease, offered 7day monitor he said currently no symptoms and will wait.          Cl De Leon MD          
Stable

## 2025-01-16 ENCOUNTER — HOSPITAL ENCOUNTER (EMERGENCY)
Facility: HOSPITAL | Age: 85
Discharge: HOME | DRG: 141 | End: 2025-01-16
Attending: EMERGENCY MEDICINE
Payer: MEDICARE

## 2025-01-16 ENCOUNTER — HOSPITAL ENCOUNTER (EMERGENCY)
Facility: HOSPITAL | Age: 85
Discharge: HOME | End: 2025-01-16
Attending: EMERGENCY MEDICINE
Payer: MEDICARE

## 2025-01-16 VITALS
WEIGHT: 146 LBS | HEART RATE: 62 BPM | OXYGEN SATURATION: 97 % | RESPIRATION RATE: 16 BRPM | DIASTOLIC BLOOD PRESSURE: 69 MMHG | BODY MASS INDEX: 23.46 KG/M2 | SYSTOLIC BLOOD PRESSURE: 142 MMHG | TEMPERATURE: 99 F | HEIGHT: 66 IN

## 2025-01-16 DIAGNOSIS — R04.0 EPISTAXIS: Primary | ICD-10-CM

## 2025-01-16 PROCEDURE — 30901 CONTROL OF NOSEBLEED: CPT

## 2025-01-16 PROCEDURE — 99283 EMERGENCY DEPT VISIT LOW MDM: CPT

## 2025-01-16 PROCEDURE — 093K7ZZ CONTROL BLEEDING IN NASAL MUCOSA AND SOFT TISSUE, VIA NATURAL OR ARTIFICIAL OPENING: ICD-10-PCS | Performed by: EMERGENCY MEDICINE

## 2025-01-16 RX ORDER — OXYMETAZOLINE HYDROCHLORIDE 0.05 G/100ML
1 SPRAY NASAL ONCE
Status: COMPLETED | OUTPATIENT
Start: 2025-01-16 | End: 2025-01-16

## 2025-01-16 RX ORDER — TRANEXAMIC ACID 100 MG/ML
5 INJECTION, SOLUTION INTRAVENOUS ONCE
Status: COMPLETED | OUTPATIENT
Start: 2025-01-16 | End: 2025-01-16

## 2025-01-16 NOTE — ED PROVIDER NOTES
Patient Seen in: [facility] Emergency Department      History     Chief Complaint   Patient presents with    Nose Bleed     Stated Complaint: pt denies blood thinner usage, having nose bleed since midnight.    Subjective:   HPI       84-year-old male, reports experiencing a sudden onset of heavy bleeding from the left nostril around midnight after getting up to urinate, which he describes as \"like a faucet.\" He has never experienced anything like this before. He is currently taking 81 mg of aspirin. Earlier in the week, he mentioned to Wyatt that he was not feeling as well as usual and had a stiff neck, for which he used a heating pad. On Tuesday night, he experienced a sensation similar to an electric bolt hitting the back of his neck. He had an echocardiogram on Monday, which showed mild mitral valve leakage, and an EKG the previous Monday, which was normal despite experiencing some slipping heartbeats.    Objective:     Past Medical History:    Arteriosclerosis of thoracic aorta (HCC)    CT incidental December 2019    Benign non-nodular prostatic hyperplasia with lower urinary tract symptoms    By imaging and modest symptoms     Bilateral cataracts    Formatting of this note might be different from the original.  Overview:   MODEST   -2014/2015    Bilateral plantar fasciitis    BPH (benign prostatic hypertrophy)    CAD (coronary artery disease)    LAD   STENTED   JONAS  -  9/2013 DR ZABALA  -  45%  LEFT   MAIN ALSO   SEEN   LV  45%      Carotid artery plaque     15-49%    9/2019     30% bilateral December 2017    Cataract    COPD (chronic obstructive pulmonary disease) ([facility])    COVID-19    3 days of cough, runny nose, low saturations needing oxygen.    Elevated C-reactive protein (CRP)    Formatting of this note might be different from the original.  Overview:   September 2016 will repeat occurred during infection viral    Elevated lipoprotein(a)    Essential hypertension    Herpes    Hiatal hernia with GERD     History of migraine    EXERCISE X 4  SINCE 2012     Hyperlipidemia    Inguinal hernia    Lumbar radiculopathy    Surgery 3/2023    Mass of upper lobe of left lung    resolved    Mitral regurgitation    mild-last echo 1/2025    OA (osteoarthritis)    hips and hands    Osteopenia    T     -2.1    2014     Pneumonia due to organism    Polycystic renal disease    Prediabetes    Skin cancer    New  Episode  Left  Cheek  -   9/2018  -    SQUAMOUS   NOSE.   F/U DR DEE  2X  YEARLY  -   WITH  RECENT   AK'S    Squamous cell carcinoma    nose x 3 and hand    Vitamin D deficiency              Past Surgical History:   Procedure Laterality Date    Angioplasty (coronary)  09/2013 9/13 cath 40% ostial LM, 80-90% mid to distal LAD (2.5 x 26mm Resolute Integrity) EF 45%    Appendectomy  1954    Cataract      Colonoscopy  01/2004    Colonoscopy N/A 10/31/2014    Procedure: COLONOSCOPY;  Surgeon: Ken Vinson MD;  Location:  ENDOSCOPY    Hernia surgery  1997    right    Injection, anesthetic/steroid, paravertebral facet joint/nerve; lumbar/sacral, single level  04/06/2022    Parminder    Ir aspiration/injection  04/28/2022    Parminder    Lumbar spine surgery  03/08/2023        Skin surgery  01/08/2020    MOHS SCCIS Right mid posterior scalp    Skin surgery  02/11/2020    MOHS, BCC, right preauricular - Dr. ABA Harris                Social History     Socioeconomic History    Marital status: Single    Number of children: 0    Years of education: 16 college   Occupational History    Occupation: retired   Tobacco Use    Smoking status: Never    Smokeless tobacco: Never   Vaping Use    Vaping status: Never Used   Substance and Sexual Activity    Alcohol use: Yes     Comment: occ beer    Drug use: No    Sexual activity: Never   Other Topics Concern    Caffeine Concern Yes     Comment: some    Stress Concern No    Weight Concern No    Special Diet No    Exercise Yes     Comment: vigorous    Seat Belt Yes   Social History Narrative     Single    No kids    Retired sales (chemical sales to steel industry)    No tobacco, drugs    1 wine 5-6x/wk                  Physical Exam     ED Triage Vitals [01/16/25 0214]   BP (!) 162/77   Pulse 80   Resp 18   Temp 99.1 °F (37.3 °C)   Temp src Oral   SpO2 98 %   O2 Device None (Room air)       Current Vitals:   Vital Signs  BP: 142/69  Pulse: 62  Resp: 16  Temp: 99.1 °F (37.3 °C)  Temp src: Oral    Oxygen Therapy  SpO2: 97 %  O2 Device: None (Room air)        Physical Exam    Vital signs reviewed  General appearance: Patient is alert and in no acute distress  HEENT: Pupils equal react to light extraocular muscles intact no scleral icterus, mucous membranes are moist, there is no erythema or exudate in the posterior pharynx, patient has some clots in the left nare and some dried blood no active bleeding currently  Neck: Supple no JVD no lymphadenopathy no meningismus no carotid bruit  CV: Regular rate and rhythm no murmur rub  Respiratory: Clear to auscultation bilaterally no crackles no wheezes no accessory muscle use  Abdomen: Soft nontender nondistended, no rebound no guarding  no hepatosplenomegaly bowel sounds are present , no pulsatile mass  Extremities: No clubbing cyanosis or edema 2+ distal pulses.  Neuro: Cranial nerves II through XII intact with no gross focal sensory or motor abnormality.      ED Course   Labs Reviewed - No data to display         Patient was evaluated the emergency department and had a towel used and blew out all his clots.  Some Afrin spray was used and held pressure for 15 minutes     After the Afrin was used traemic acid soaked cottonball was used and inserted.  There was no further bleeding.  Patient was monitored for an extra 15 minutes and showed no area that was bleeding.  He was breathing normally and was comfortable going home.  Did send the Afrin home with him if he does start to bleed again insert a couple sprays make sure he is using a humidifier at home.  Return if  any worsening bleeding  MDM      Differential diagnosis reflecting the complexity of care include: Epistaxis      Shared decision making was done by self and patient.  He was comfortable going home had no further bleeding.  Nothing was seen to be cauterized may have been just a tiny vessel that blood if bleeding returns or cannot get a stop he should return            Medical Decision Making      Disposition and Plan     Clinical Impression:  1. Epistaxis         Disposition:  Discharge  1/16/2025  5:07 am    Follow-up:  Cl De Leon MD  [facility] [address] [place] [id number]  [phone]    Follow up            Medications Prescribed:  Discharge Medication List as of 1/16/2025  5:12 AM              Supplementary Documentation: Patient was screened and evaluated during this visit.  As the treating physician attending to the patient, I determined within reasonable clinical confidence and prior to discharge, that an emergency medical condition was not or was no longer present.  There was no indication for further evaluation, treatment, or admission on an emergency basis.  Comprehensive verbal and written discharge and follow-up instructions were provided to help prevent relapse or worsening.  Patient was instructed to follow-up with primary care provider for further evaluation treatment, return immediately to ER for worsening, concerning, new, or changing/persisting symptoms.  I discussed the case with the patient and they had no questions, complaints, or concerns.  Patient was comfortable going home.      Dictation Disclaimer Note:   To increase efficiency this document may have been prepared using voice recognition technology. Every effort has been made to correct any errors made during preparation of this note. However, if a word or phrase is confusing, or does not make sense, this is likely due to a recognition error within the program which was not discovered during editing. Please do not hesitate to  contact to address any significant errors.    Note to Patient:   The 21st Century Cures Act makes medical notes like these available to patients in the interest of transparency. Please be advised this is a medical document. Medical documents are intended to carry relevant information, facts as evident, and the clinical opinion of the practitioner. The medical note is intended as peer to peer communication and may appear blunt or direct. It is written in medical language and may contain abbreviations or verbiage that are unfamiliar.

## 2025-01-16 NOTE — ED INITIAL ASSESSMENT (HPI)
Pt to the emergency room for ongoing nosebleed since midnight. Pt states he has tried packing it at home and applying pressure with no imptovement . Pt denies blood thinner usage. No other complaints at this time.

## 2025-01-17 ENCOUNTER — HOSPITAL ENCOUNTER (EMERGENCY)
Facility: HOSPITAL | Age: 85
Discharge: HOME | End: 2025-01-18
Attending: EMERGENCY MEDICINE
Payer: MEDICARE

## 2025-01-17 ENCOUNTER — TELEPHONE (OUTPATIENT)
Dept: INTERNAL MEDICINE CLINIC | Facility: CLINIC | Age: 85
End: 2025-01-17

## 2025-01-17 ENCOUNTER — HOSPITAL ENCOUNTER (EMERGENCY)
Facility: HOSPITAL | Age: 85
Discharge: HOME | DRG: 141 | End: 2025-01-18
Attending: EMERGENCY MEDICINE
Payer: MEDICARE

## 2025-01-17 DIAGNOSIS — R04.0 EPISTAXIS: Primary | ICD-10-CM

## 2025-01-17 PROCEDURE — 99284 EMERGENCY DEPT VISIT MOD MDM: CPT

## 2025-01-17 PROCEDURE — 30905 CONTROL OF NOSEBLEED: CPT

## 2025-01-17 PROCEDURE — 2Y41X5Z PACKING OF NASAL REGION USING PACKING MATERIAL: ICD-10-PCS | Performed by: EMERGENCY MEDICINE

## 2025-01-17 NOTE — TELEPHONE ENCOUNTER
Dr De Leon advised the following:  In ER for nosebleed Thurs check on him FRI     [phone]   Left message to call back

## 2025-01-18 ENCOUNTER — HOSPITAL ENCOUNTER (INPATIENT)
Facility: HOSPITAL | Age: 85
LOS: 5 days | Discharge: NURSING, REHAB OR LONG-TERM CARE | DRG: 141 | End: 2025-01-24
Attending: EMERGENCY MEDICINE | Admitting: INTERNAL MEDICINE
Payer: MEDICARE

## 2025-01-18 ENCOUNTER — HOSPITAL ENCOUNTER (INPATIENT)
Facility: HOSPITAL | Age: 85
LOS: 5 days | Discharge: NURSING, REHAB OR LONG-TERM CARE | End: 2025-01-24
Attending: EMERGENCY MEDICINE | Admitting: INTERNAL MEDICINE
Payer: MEDICARE

## 2025-01-18 VITALS
WEIGHT: 146 LBS | RESPIRATION RATE: 18 BRPM | TEMPERATURE: 98 F | SYSTOLIC BLOOD PRESSURE: 158 MMHG | OXYGEN SATURATION: 98 % | HEIGHT: 66 IN | BODY MASS INDEX: 23.46 KG/M2 | DIASTOLIC BLOOD PRESSURE: 82 MMHG | HEART RATE: 59 BPM

## 2025-01-18 DIAGNOSIS — R04.0 EPISTAXIS: Primary | ICD-10-CM

## 2025-01-18 LAB
ALBUMIN SERPL-MCNC: 4.5 G/DL (ref 3.2–4.8)
ALBUMIN/GLOB SERPL: 1.5 {RATIO} (ref 1–2)
ALP LIVER SERPL-CCNC: 78 U/L
ALT SERPL-CCNC: 21 U/L
ANION GAP SERPL CALC-SCNC: 8 MMOL/L (ref 0–18)
ANTIBODY SCREEN: NEGATIVE
APTT PPP: 25.6 SECONDS (ref 23–36)
AST SERPL-CCNC: 28 U/L (ref ?–34)
BASOPHILS # BLD AUTO: 0.03 X10(3) UL (ref 0–0.2)
BASOPHILS # BLD AUTO: 0.04 X10(3) UL (ref 0–0.2)
BASOPHILS NFR BLD AUTO: 0.4 %
BASOPHILS NFR BLD AUTO: 0.5 %
BILIRUB SERPL-MCNC: 0.8 MG/DL (ref 0.2–1.1)
BUN BLD-MCNC: 22 MG/DL (ref 9–23)
CALCIUM BLD-MCNC: 10 MG/DL (ref 8.7–10.6)
CHLORIDE SERPL-SCNC: 106 MMOL/L (ref 98–112)
CO2 SERPL-SCNC: 29 MMOL/L (ref 21–32)
CREAT BLD-MCNC: 0.83 MG/DL
EGFRCR SERPLBLD CKD-EPI 2021: 86 ML/MIN/1.73M2 (ref 60–?)
EOSINOPHIL # BLD AUTO: 0.08 X10(3) UL (ref 0–0.7)
EOSINOPHIL # BLD AUTO: 0.11 X10(3) UL (ref 0–0.7)
EOSINOPHIL NFR BLD AUTO: 1.1 %
EOSINOPHIL NFR BLD AUTO: 1.5 %
ERYTHROCYTE [DISTWIDTH] IN BLOOD BY AUTOMATED COUNT: 12 %
ERYTHROCYTE [DISTWIDTH] IN BLOOD BY AUTOMATED COUNT: 12.1 %
GLOBULIN PLAS-MCNC: 3.1 G/DL (ref 2–3.5)
GLUCOSE BLD-MCNC: 107 MG/DL (ref 70–99)
HCT VFR BLD AUTO: 34.8 %
HCT VFR BLD AUTO: 40.1 %
HGB BLD-MCNC: 11.8 G/DL
HGB BLD-MCNC: 14.1 G/DL
IMM GRANULOCYTES # BLD AUTO: 0.02 X10(3) UL (ref 0–1)
IMM GRANULOCYTES # BLD AUTO: 0.02 X10(3) UL (ref 0–1)
IMM GRANULOCYTES NFR BLD: 0.3 %
IMM GRANULOCYTES NFR BLD: 0.3 %
INR BLD: 1.11 (ref 0.8–1.2)
INR BLD: 1.15 (ref 0.8–1.2)
LYMPHOCYTES # BLD AUTO: 1.64 X10(3) UL (ref 1–4)
LYMPHOCYTES # BLD AUTO: 1.95 X10(3) UL (ref 1–4)
LYMPHOCYTES NFR BLD AUTO: 21.6 %
LYMPHOCYTES NFR BLD AUTO: 26.1 %
MCH RBC QN AUTO: 32.3 PG (ref 26–34)
MCH RBC QN AUTO: 33 PG (ref 26–34)
MCHC RBC AUTO-ENTMCNC: 33.9 G/DL (ref 31–37)
MCHC RBC AUTO-ENTMCNC: 35.2 G/DL (ref 31–37)
MCV RBC AUTO: 93.9 FL
MCV RBC AUTO: 95.3 FL
MONOCYTES # BLD AUTO: 0.94 X10(3) UL (ref 0.1–1)
MONOCYTES # BLD AUTO: 0.96 X10(3) UL (ref 0.1–1)
MONOCYTES NFR BLD AUTO: 12.4 %
MONOCYTES NFR BLD AUTO: 12.9 %
NEUTROPHILS # BLD AUTO: 4.39 X10 (3) UL (ref 1.5–7.7)
NEUTROPHILS # BLD AUTO: 4.39 X10(3) UL (ref 1.5–7.7)
NEUTROPHILS # BLD AUTO: 4.89 X10 (3) UL (ref 1.5–7.7)
NEUTROPHILS # BLD AUTO: 4.89 X10(3) UL (ref 1.5–7.7)
NEUTROPHILS NFR BLD AUTO: 58.7 %
NEUTROPHILS NFR BLD AUTO: 64.2 %
OSMOLALITY SERPL CALC.SUM OF ELEC: 300 MOSM/KG (ref 275–295)
PLATELET # BLD AUTO: 168 10(3)UL (ref 150–450)
PLATELET # BLD AUTO: 194 10(3)UL (ref 150–450)
POTASSIUM SERPL-SCNC: 3.6 MMOL/L (ref 3.5–5.1)
PROT SERPL-MCNC: 7.6 G/DL (ref 5.7–8.2)
PROTHROMBIN TIME: 14.4 SECONDS (ref 11.6–14.8)
PROTHROMBIN TIME: 14.9 SECONDS (ref 11.6–14.8)
RBC # BLD AUTO: 3.65 X10(6)UL
RBC # BLD AUTO: 4.27 X10(6)UL
RH BLOOD TYPE: POSITIVE
RH BLOOD TYPE: POSITIVE
SODIUM SERPL-SCNC: 143 MMOL/L (ref 136–145)
WBC # BLD AUTO: 7.5 X10(3) UL (ref 4–11)
WBC # BLD AUTO: 7.6 X10(3) UL (ref 4–11)

## 2025-01-18 PROCEDURE — 30903 CONTROL OF NOSEBLEED: CPT | Performed by: STUDENT IN AN ORGANIZED HEALTH CARE EDUCATION/TRAINING PROGRAM

## 2025-01-18 PROCEDURE — 99223 1ST HOSP IP/OBS HIGH 75: CPT | Performed by: INTERNAL MEDICINE

## 2025-01-18 PROCEDURE — 99223 1ST HOSP IP/OBS HIGH 75: CPT | Performed by: STUDENT IN AN ORGANIZED HEALTH CARE EDUCATION/TRAINING PROGRAM

## 2025-01-18 RX ORDER — PANTOPRAZOLE SODIUM 40 MG/1
40 TABLET, DELAYED RELEASE ORAL
Status: DISCONTINUED | OUTPATIENT
Start: 2025-01-19 | End: 2025-01-24

## 2025-01-18 RX ORDER — METOPROLOL SUCCINATE 25 MG/1
25 TABLET, EXTENDED RELEASE ORAL 2 TIMES DAILY
Status: DISCONTINUED | OUTPATIENT
Start: 2025-01-18 | End: 2025-01-22

## 2025-01-18 RX ORDER — AMLODIPINE BESYLATE 5 MG/1
5 TABLET ORAL NIGHTLY
Status: DISCONTINUED | OUTPATIENT
Start: 2025-01-18 | End: 2025-01-24

## 2025-01-18 RX ORDER — TAMSULOSIN HYDROCHLORIDE 0.4 MG/1
0.4 CAPSULE ORAL DAILY
Status: DISCONTINUED | OUTPATIENT
Start: 2025-01-18 | End: 2025-01-24

## 2025-01-18 RX ORDER — ACETAMINOPHEN 500 MG
1000 TABLET ORAL EVERY 8 HOURS PRN
Status: DISCONTINUED | OUTPATIENT
Start: 2025-01-18 | End: 2025-01-24

## 2025-01-18 RX ORDER — ECHINACEA PURPUREA EXTRACT 125 MG
1 TABLET ORAL
Status: DISCONTINUED | OUTPATIENT
Start: 2025-01-18 | End: 2025-01-21

## 2025-01-18 RX ORDER — ATORVASTATIN CALCIUM 40 MG/1
40 TABLET, FILM COATED ORAL NIGHTLY
Status: DISCONTINUED | OUTPATIENT
Start: 2025-01-18 | End: 2025-01-24

## 2025-01-18 RX ORDER — SENNOSIDES 8.6 MG
17.2 TABLET ORAL NIGHTLY PRN
Status: DISCONTINUED | OUTPATIENT
Start: 2025-01-18 | End: 2025-01-24

## 2025-01-18 RX ORDER — SODIUM PHOSPHATE, DIBASIC AND SODIUM PHOSPHATE, MONOBASIC 7; 19 G/230ML; G/230ML
1 ENEMA RECTAL ONCE AS NEEDED
Status: DISCONTINUED | OUTPATIENT
Start: 2025-01-18 | End: 2025-01-24

## 2025-01-18 RX ORDER — METOCLOPRAMIDE HYDROCHLORIDE 5 MG/ML
5 INJECTION INTRAMUSCULAR; INTRAVENOUS EVERY 8 HOURS PRN
Status: DISCONTINUED | OUTPATIENT
Start: 2025-01-18 | End: 2025-01-19

## 2025-01-18 RX ORDER — BENZONATATE 100 MG/1
200 CAPSULE ORAL 3 TIMES DAILY PRN
Status: DISCONTINUED | OUTPATIENT
Start: 2025-01-18 | End: 2025-01-24

## 2025-01-18 RX ORDER — BISACODYL 10 MG
10 SUPPOSITORY, RECTAL RECTAL
Status: DISCONTINUED | OUTPATIENT
Start: 2025-01-18 | End: 2025-01-24

## 2025-01-18 RX ORDER — POLYETHYLENE GLYCOL 3350 17 G/17G
17 POWDER, FOR SOLUTION ORAL DAILY PRN
Status: DISCONTINUED | OUTPATIENT
Start: 2025-01-18 | End: 2025-01-24

## 2025-01-18 NOTE — CONSULTS
[facility]   part of [facility]    Report of Consultation    Jose Angel Kincaid Patient Status:  Observation    7/10/1940 MRN [id number]   Location [facility] 3NW-A Attending Edvin Hu, DO   Hosp Day # 0 PCP Cl De Leon MD     Date of Admission:  2025  Date of Consult:  2025  Reason for Consultation:   Epistaxis    History of Present Illness:   Patient is a 84 year old male who was admitted to the hospital for    Epistaxis. Has presented to the ED x 3 in the last several days. Twice in the last 24 hours. Left sided. On 81mg aspirin, no other AC. Had a left sided 7.5 RR replaced in the ED this AM. Hgb is above 14. Hx of HTN    Past Medical History  Past Medical History:    Arteriosclerosis of thoracic aorta (HCC)    CT incidental 2019    Benign non-nodular prostatic hyperplasia with lower urinary tract symptoms    By imaging and modest symptoms     Bilateral cataracts    Formatting of this note might be different from the original.  Overview:   MODEST   -    Bilateral plantar fasciitis    BPH (benign prostatic hypertrophy)    CAD (coronary artery disease)    LAD   STENTED   JONAS  -  2013 DR ZABALA  -  45%  LEFT   MAIN ALSO   SEEN   LV  45%      Carotid artery plaque     15-49%    2019     30% bilateral 2017    Cataract    COPD (chronic obstructive pulmonary disease) (HCC)    COVID-19    3 days of cough, runny nose, low saturations needing oxygen.    Elevated C-reactive protein (CRP)    Formatting of this note might be different from the original.  Overview:   2016 will repeat occurred during infection viral    Elevated lipoprotein(a)    Essential hypertension    Herpes    Hiatal hernia with GERD    History of migraine    EXERCISE X 4  SINCE      Hyperlipidemia    Inguinal hernia    Lumbar radiculopathy    Surgery 3/2023    Mass of upper lobe of left lung    resolved    Mitral regurgitation    mild-last echo 2025    OA (osteoarthritis)     hips and hands    Osteopenia    T     -2.1    2014     Pneumonia due to organism    Polycystic renal disease    Prediabetes    Skin cancer    New  Episode  Left  Cheek  -   9/2018  -    SQUAMOUS   NOSE.   F/U DR DEE  2X  YEARLY  -   WITH  RECENT   AK'S    Squamous cell carcinoma    nose x 3 and hand    Vitamin D deficiency       Past Surgical History  Past Surgical History:   Procedure Laterality Date    Angioplasty (coronary)  09/2013 9/13 cath 40% ostial LM, 80-90% mid to distal LAD (2.5 x 26mm Resolute Integrity) EF 45%    Appendectomy  1954    Cataract      Colonoscopy  01/2004    Colonoscopy N/A 10/31/2014    Procedure: COLONOSCOPY;  Surgeon: Ken Vinson MD;  Location:  ENDOSCOPY    Hernia surgery  1997    right    Injection, anesthetic/steroid, paravertebral facet joint/nerve; lumbar/sacral, single level  04/06/2022    Parminder    Ir aspiration/injection  04/28/2022    Parminder    Lumbar spine surgery  03/08/2023        Skin surgery  01/08/2020    MOHS SCCIS Right mid posterior scalp    Skin surgery  02/11/2020    MOHS, BCC, right preauricular - Dr. ABA Harris       Family History  Family History   Problem Relation Age of Onset    Pacemaker Mother     Hypertension Mother        Social History  Pediatric History   Patient Parents    Not on file     Other Topics Concern     Service Not Asked    Blood Transfusions Not Asked    Caffeine Concern Yes     Comment: some    Occupational Exposure Not Asked    Hobby Hazards Not Asked    Sleep Concern Not Asked    Stress Concern No    Weight Concern No    Special Diet No    Back Care Not Asked    Exercise Yes     Comment: vigorous    Bike Helmet Not Asked    Seat Belt Yes    Self-Exams Not Asked   Social History Narrative    Single    No kids    Retired sales (chemical sales to steel industry)    No tobacco, drugs    1 wine 5-6x/wk           Current Medications:  Current Facility-Administered Medications   Medication Dose Route Frequency    amLODIPine  (Norvasc) tab 5 mg  5 mg Oral Nightly    tamsulosin (Flomax) cap 0.4 mg  0.4 mg Oral Daily    [START ON 1/19/2025] pantoprazole (Protonix) DR tab 40 mg  40 mg Oral QAM AC    atorvastatin (Lipitor) tab 40 mg  40 mg Oral Nightly    acetaminophen (Tylenol Extra Strength) tab 1,000 mg  1,000 mg Oral Q8H PRN    melatonin tab 3 mg  3 mg Oral Nightly PRN    metoprolol succinate ER (Toprol XL) 24 hr tab 25 mg  25 mg Oral BID    metoclopramide (Reglan) 5 mg/mL injection 5 mg  5 mg Intravenous Q8H PRN    polyethylene glycol (PEG 3350) (Miralax) 17 g oral packet 17 g  17 g Oral Daily PRN    sennosides (Senokot) tab 17.2 mg  17.2 mg Oral Nightly PRN    bisacodyl (Dulcolax) 10 MG rectal suppository 10 mg  10 mg Rectal Daily PRN    fleet enema (Fleet) rectal enema 133 mL  1 enema Rectal Once PRN    benzonatate (Tessalon) cap 200 mg  200 mg Oral TID PRN    guaiFENesin (Robitussin) 100 MG/5 ML oral liquid 200 mg  200 mg Oral Q4H PRN    glycerin-hypromellose- (Artificial Tears) 0.2-0.2-1 % ophthalmic solution 1 drop  1 drop Both Eyes QID PRN    sodium chloride (Saline Mist) 0.65 % nasal solution 1 spray  1 spray Each Nare Q3H PRN     Medications Prior to Admission   Medication Sig    bumetanide (BUMEX) 1 MG Oral Tab One daily am    metoprolol succinate ER 25 MG Oral Tablet 24 Hr Take 1 tablet (25 mg total) by mouth in the morning and 1 tablet (25 mg total) before bedtime.    tamsulosin 0.4 MG Oral Cap TAKE ONE CAPSULE BY MOUTH ONE TIME DAILY    Omeprazole 40 MG Oral Capsule Delayed Release Take 1 capsule (40 mg total) by mouth daily.    atorvastatin 40 MG Oral Tab Take 1 tablet (40 mg total) by mouth nightly.    amLODIPine 5 MG Oral Tab Take 1 tablet (5 mg total) by mouth at bedtime.    aspirin 81 MG Oral Tab EC Take 1 tablet (81 mg total) by mouth every morning.    ergocalciferol 1.25 MG (26503 UT) Oral Cap Take 1 capsule (50,000 Units total) by mouth every 14 (fourteen) days.    Apoaequorin (PREVAGEN) 10 MG Oral Cap Take 1  tablet by mouth daily.    fluticasone propionate 50 MCG/ACT Inhalation Aerosol Powder, Breath Activated Inhale 1 puff into the lungs as needed.    ascorbic acid 1000 MG Oral Tab Take 0.5 tablets (500 mg total) by mouth 2 (two) times daily.    Selenium 100 MCG Oral Tab Take 1 tablet (100 mcg total) by mouth every evening.    magnesium 250 MG Oral Tab Take 1 tablet (250 mg total) by mouth every other day. Magnesium oxide 250mg every other day.    omega-3 fatty acids 1000 MG Oral Cap Take 1,000 mg by mouth daily.    Nutritional Supplements (JUICE PLUS FIBRE OR) Take 2 tablets by mouth in the morning, at noon, and at bedtime.    Zinc 50 MG Oral Tab Take 1 tablet by mouth one time.    Coenzyme Q10 (COQ10) 100 MG Oral Cap Take 1 capsule by mouth at bedtime.    Glucosamine-Chondroit-Vit C-Mn (GLUCOSAMINE CHONDR 1500 COMPLX OR) Take 1 tablet by mouth 2 (two) times daily.    Calcium Carbonate-Vitamin D (CALTRATE 600+D OR) Take 1 tablet by mouth every other day.       Allergies  Allergies[1]    Review of Systems:   Pertinent items are noted in HPI.    Physical Exam:   Blood pressure (!) 152/92, pulse 87, temperature 99.4 °F (37.4 °C), temperature source Oral, resp. rate 18, height 5' 6\" (1.676 m), weight 147 lb (66.7 kg), SpO2 100%.         Constitutional Normal Overall appearance -somewhat anxious   Psychiatric Normal Orientation - Oriented to time, place, person & situation. Appropriate mood and affect.   Neck Exam Normal Inspection - Normal. Palpation - Normal. Parotid gland - Normal. Thyroid gland - Normal.   Eyes Normal Conjunctiva - Right: Normal, Left: Normal. Pupil - Right: Normal, Left: Normal.     Neurological Normal Memory - Normal. Cranial nerves - Cranial nerves II through XII grossly intact.   Head/Face Normal Facial features - Normal. Eyebrows - Normal. Skull - Normal.        Nose/Nasopharynx mouth throat Normal External nose - Mild oozing from bilateral nares. Lips/teeth/gums - Normal. Tonsils - Normal.  Oropharynx - Large clot in posterior pharynx removed   Ears Normal Inspection - Right: Normal, Left: Normal. Canal - Right: Normal, Left: Normal. TM - Right: Normal, Left: Normal.   Skin Normal Inspection - Normal.        Lymph Detail Normal Submental. Submandibular. Anterior cervical. Posterior cervical. Supraclavicular.     Procedure  Control of epistaxis, complicated  Verbal consent obtained. He continue to have mild oozing from L > R nares despite repositioning and inflating left RR with saline. The clot in the posterior pharynx was evacuated. A 7.5 cm RR was placed into the right nasal cavity. This was inflated appropriately with saline. No complications. Vitals monitored    Results:     Laboratory Data:  Lab Results   Component Value Date    WBC 7.5 2025    HGB 14.1 2025    HCT 40.1 2025    .0 2025    CREATSERUM 0.83 2025    BUN 22 2025     2025    K 3.6 2025     2025    CO2 29.0 2025     (H) 2025    CA 10.0 2025    ALB 4.5 2025    ALKPHO 78 2025    TP 7.6 2025    AST 28 2025    ALT 21 2025    PTT 25.6 2025    INR 1.11 2025    PTP 14.4 2025    T4F 1.0 2018    TSH 4.230 2024    PSA 2.77 03/15/2024    CRP <0.29 2018    B12 508 03/15/2024         Imaging:  CARD ECHO 2D DOPPLER (CPT=93306)    Result Date: 2025  Transthoracic Echocardiogram Name:Jose Angel Kincaid Date: 2025 :  07/10/1940 Ht:  (66in)  BP: 138 / 72 MRN:  [id number]    Age:  84years    Wt:  (147lb) HR: 75bpm Loc:  [facility]       Gndr: M          BSA: 1.75m^2 Sonographer: Brenda DAVE Ordering:    Cl De Leon Referring:   Cl De Leon ---------------------------------------------------------------------------- History/Indications:   Palpitations.  Cardiac Arrythmia. ---------------------------------------------------------------------------- Procedure information:  A transthoracic  complete 2D study was performed. Additional evaluation included M-mode, complete spectral Doppler, and color Doppler.  Patient status:  Outpatient.  Location:  Echo laboratory.    No prior study was available for comparison.    This was a routine study. Transthoracic echocardiography for ventricular function evaluation and assessment of valvular function. Image quality was adequate. ECG rhythm:   Sinus Rhythm with frequent ectopy ---------------------------------------------------------------------------- Conclusions: 1. Left ventricle: The cavity size was normal. Wall thickness was normal.    Systolic function was normal. The estimated ejection fraction was 60-65%,    by visual assessment. No diagnostic evidence for regional wall motion    abnormalities. Doppler parameters are consistent with abnormal left    ventricular relaxation - grade 1 diastolic dysfunction. 2. Left atrium: The left atrial volume was normal. 3. Aortic valve: The peak systolic velocity was 1.34m/sec. The mean systolic    gradient was 4mm Hg. 4. Mitral valve: There was mild regurgitation. 5. Pulmonary arteries: Systolic pressure was mildly increased, in the range    of 30mm Hg to 35mm Hg. Impressions:  No previous study from [facility] was available for comparison. * ---------------------------------------------------------------------------- * Findings: Left ventricle:  The cavity size was normal. Wall thickness was normal. Systolic function was normal. The estimated ejection fraction was 60-65%, by visual assessment. No diagnostic evidence for regional wall motion abnormalities. Doppler parameters are consistent with abnormal left ventricular relaxation - grade 1 diastolic dysfunction. Left atrium:  The atrium was normal in size. The left atrial volume was normal. Right ventricle:  The cavity size was normal. Systolic function was normal. Right atrium:  The atrium was normal in size. Mitral valve:  The valve was structurally  normal. Leaflet separation was normal.  Doppler:  Transvalvular velocity was within the normal range. There was no evidence for stenosis. There was mild regurgitation. Aortic valve:   The valve was trileaflet. The leaflets were mildly calcified. Cusp separation was normal.  Doppler:  Transvalvular velocity was within the normal range, due to increased transvalvular flow. There was no evidence for stenosis. There was trivial regurgitation.    The mean systolic gradient was 4mm Hg. The peak systolic gradient was 7mm Hg. Tricuspid valve:  The valve is structurally normal. Leaflet separation was normal.  Doppler:  Transvalvular velocity was within the normal range. There was no evidence for stenosis. There was mild regurgitation. Pulmonic valve:   The valve is structurally normal. Cusp separation was normal.  Doppler:  Transvalvular velocity was within the normal range. There was no evidence for stenosis. There was no significant regurgitation. Pericardium:   There was no pericardial effusion. Aorta: Aortic root: The aortic root was normal. Ascending aorta: The ascending aorta was normal. Pulmonary arteries: Systolic pressure was mildly increased, in the range of 30mm Hg to 35mm Hg. Systemic veins:  Central venous respirophasic diameter changes are in the normal range (>50%). Inferior vena cava: The IVC was normal-sized. ---------------------------------------------------------------------------- Measurements  Left ventricle                    Value        Ref  IVS thickness, ED, PLAX           0.7   cm     0.6 -                                                 1.0  LV ID, ED, PLAX               (L) 3.8   cm     4.2 -                                                 5.8  LV ID, ES, PLAX               (L) 2.3   cm     2.5 -                                                 4.0  LV PW thickness, ED, PLAX         0.9   cm     0.6 -                                                 1.0  IVS/LV PW ratio, ED, PLAX         0.71          --------  LV PW/LV ID ratio, ED, PLAX       0.24         --------  LV ejection fraction              72    %      52 - 72  LV e', lateral                (L) 6.4   cm/sec >=10.0  LV E/e', lateral                  7            <=13  LV e', medial                 (L) 5.3   cm/sec >=7.0  LV E/e', medial                   8            --------  LV e', average                    5.9   cm/sec --------  LV E/e', average                  8            <=14  LVOT                              Value        Ref  LVOT peak velocity, S             1.06  m/sec  --------  LVOT VTI, S                       20.7  cm     --------  LVOT mean gradient, S             2     mm Hg  --------  Aortic valve                      Value        Ref  Aortic valve peak velocity, S     1.34  m/sec  --------  Aortic valve VTI, S               24.1  cm     --------  Aortic mean gradient, S           4     mm Hg  --------  Aortic peak gradient, S           7     mm Hg  --------  Velocity ratio, peak, LVOT/AV     0.79         --------  Aortic root                       Value        Ref  Aortic root ID, ED                3.1   cm     2.5 -                                                 3.9  Ascending aorta                   Value        Ref  Ascending aorta ID, A-P, ED       3.4   cm     2.2 -                                                 3.8  Left atrium                       Value        Ref  LA ID, A-P, ES                    3.1   cm     3.0 -                                                 4.0  LA volume, S                      31    ml     18 - 58  LA volume/bsa, S                  18    ml/m^2 16 - 34  LA volume, ES, 1-p A4C            28    ml     18 - 58  LA volume, ES, 1-p A2C            31    ml     18 - 58  LA volume, ES, A/L                35    ml     --------  LA volume/bsa, ES, A/L            20    ml/m^2 16 - 34  LA/aortic root ratio              1            --------  Mitral valve                      Value        Ref  Mitral E-wave  peak velocity       0.45  m/sec  --------  Mitral A-wave peak velocity       0.61  m/sec  --------  Mitral E/A ratio, peak            0.7          --------  Pulmonary artery                  Value        Ref  PA pressure, S, DP                32    mm Hg  --------  Tricuspid valve                   Value        Ref  Tricuspid regurg peak             2.67  m/sec  <=2.8  velocity  Tricuspid peak RV-RA gradient     29    mm Hg  --------  Systemic veins                    Value        Ref  Estimated CVP                     3     mm Hg  --------  Inferior vena cava                Value        Ref  ID                                1.9   cm     <=2.1  Right ventricle                   Value        Ref  TAPSE, 2D                         2.33  cm     >=1.70  RV pressure, S, DP                32    mm Hg  -------- Legend: (L)  and  (H)  pilar values outside specified reference range. ---------------------------------------------------------------------------- Prepared and electronically signed by Yana Pavon MD 01/13/2025 12:18         Impression/Recommendations:   83 y/o with left sided epistaxis, recurrent over the last several days    Recommendations  -A second 7.5 cm RR placed into the right nasal cavity, bleeding ceased  -Recommend telemetry with bilateral RR and instructed staff to deflate one balloon if becomes bradycardic or hypotensive  -May need additional pain control with bilateral RR  -Will observe, if bleeds may consider endoscopy with cautery vs IR embolization  -Will leave bilateral RR in at least over the weekend may pull one packing as an inpatient on Monday  -BP control  -Abx while packing in place  -Monitor H/H  -Call with questions    All questions and concerns were addressed. We appreciate the opportunity to participate in the care of this patient. Please do not hesitate to call our office ([phone]) with any issues.  A total of 90 minutes were spent in the care of this patient More than 50% of  this time was spent face to face    This note was partially prepared using Dragon Medical voice recognition dictation software. As a result, errors may occur. When identified, these errors have been corrected. While every attempt is made to correct errors during dictation, discrepancies may still exist   Son Trinidad MD  1/18/2025          [1]   Allergies  Allergen Reactions    Codeine OTHER (SEE COMMENTS)     Throat  tightness    Hctz [Hydrochlorothiazide] UNKNOWN    Nsaids OTHER (SEE COMMENTS)     GI    Zofran [Ondansetron] OTHER (SEE COMMENTS)     Constipation     Pepcid [Famotidine] OTHER (SEE COMMENTS)     neck and shoulder pains

## 2025-01-18 NOTE — ED QUICK NOTES
Scant drainage from pt nose at this time, there is some draining, but minimal. Pt asked for water, request declined until clot is cleared from back of throat. Pt given chap stick.     RR easy, unlabored

## 2025-01-18 NOTE — CM/SW NOTE
Received a call from the pt's Rn requesting for a ride home for the pt. Pt's neighbor unable to pick him up. One time lyft courtesy ride arranged. Ride details given to the pt's Rn.

## 2025-01-18 NOTE — ED QUICK NOTES
Orders for admission, patient is aware of plan and ready to go upstairs. Any questions, please call ED RN Aniya at extension [phone].     Patient Covid vaccination status: Fully vaccinated and immunocompromised     COVID Test Ordered in ED: None    COVID Suspicion at Admission: N/A    Running Infusions:  None    Mental Status/LOC at time of transport: A&Ox4    Other pertinent information:   CIWA score: N/A   NIH score:  N/A

## 2025-01-18 NOTE — PLAN OF CARE
Pt here from: Home alone   Neuro: A&Ox4, VSS, RA, , IS. Denies cough, chest pain, and SOB.   Telemetry: Normal sinus rhythm. ENT ordered tele d/t bilateral Rhino rockets.   GI: Abdomen soft, passing gas and belching. Denies nausea. Last BM was 1/16/24  : Voids freely in bathroom.   Pain controlled with meds per MAR.   Up with SBA d/t potential bradycardia or hypotension. Pt uses no cane or walker. Pt very steady on feet.   Drains: Bilatral rhino rockets intact. ENT filled both with saline. Bleeding significantly less.  Diet: NPO sips with meds, ice chips.   IV saline locked. No fluid orders per hospitalist.   All appropriate safety measures in place. All questions and concerns addressed. Bed locked and in lowest position. Call light in reach.     ENT paged when pts nose would not stop bleeding. ENT came to bedside, with assist from this RN, added another RR and filled both with saline. ENT pulled a large blood clot from pts throat. Pt expresses immediate relief.  Bleeding nearly stopped. Pt cleaned up and given new gown. Pt now doing very well. Bed alarm on but pt aware not to get up on his own.

## 2025-01-18 NOTE — ED INITIAL ASSESSMENT (HPI)
Patient here with c/o nosebleed.  Patient was here last night for same issue.  Patient has nasal packing in left nostril and reports he is still bleeding around it.

## 2025-01-18 NOTE — ED PROVIDER NOTES
Patient Seen in: [facility] Emergency Department      History     Chief Complaint   Patient presents with    Nose Bleed     Nose bleeding still      Stated Complaint:     Subjective:   HPI      84-year-old male comes to the hospital a nosebleed.  He says that about 48 hours ago he was here for similar.  He states they were able to get a controlled without packing or silver nitrate and he says he was doing well until tonight when it started again.  He denies any trauma to the nose.  Denies any headaches or dizziness, chest pain or shortness of breath.  No other complaints.  He has no significant history of this in the past.  The patient is not on anticoagulants.  Objective:     Past Medical History:    Arteriosclerosis of thoracic aorta (HCC)    CT incidental December 2019    Benign non-nodular prostatic hyperplasia with lower urinary tract symptoms    By imaging and modest symptoms     Bilateral cataracts    Formatting of this note might be different from the original.  Overview:   MODEST   -2014/2015    Bilateral plantar fasciitis    BPH (benign prostatic hypertrophy)    CAD (coronary artery disease)    LAD   STENTED   JONAS  -  9/2013 DR ZABALA  -  45%  LEFT   MAIN ALSO   SEEN   LV  45%      Carotid artery plaque     15-49%    9/2019     30% bilateral December 2017    Cataract    COPD (chronic obstructive pulmonary disease) (HCC)    COVID-19    3 days of cough, runny nose, low saturations needing oxygen.    Elevated C-reactive protein (CRP)    Formatting of this note might be different from the original.  Overview:   September 2016 will repeat occurred during infection viral    Elevated lipoprotein(a)    Essential hypertension    Herpes    Hiatal hernia with GERD    History of migraine    EXERCISE X 4  SINCE 2012     Hyperlipidemia    Inguinal hernia    Lumbar radiculopathy    Surgery 3/2023    Mass of upper lobe of left lung    resolved    Mitral regurgitation    mild-last echo 1/2025    OA (osteoarthritis)     hips and hands    Osteopenia    T     -2.1    2014     Pneumonia due to organism    Polycystic renal disease    Prediabetes    Skin cancer    New  Episode  Left  Cheek  -   9/2018  -    SQUAMOUS   NOSE.   F/U DR DEE  2X  YEARLY  -   WITH  RECENT   AK'S    Squamous cell carcinoma    nose x 3 and hand    Vitamin D deficiency              Past Surgical History:   Procedure Laterality Date    Angioplasty (coronary)  09/2013 9/13 cath 40% ostial LM, 80-90% mid to distal LAD (2.5 x 26mm Resolute Integrity) EF 45%    Appendectomy  1954    Cataract      Colonoscopy  01/2004    Colonoscopy N/A 10/31/2014    Procedure: COLONOSCOPY;  Surgeon: Ken Vinson MD;  Location:  ENDOSCOPY    Hernia surgery  1997    right    Injection, anesthetic/steroid, paravertebral facet joint/nerve; lumbar/sacral, single level  04/06/2022    Parminder    Ir aspiration/injection  04/28/2022    Parminder    Lumbar spine surgery  03/08/2023        Skin surgery  01/08/2020    MOHS SCCIS Right mid posterior scalp    Skin surgery  02/11/2020    MOHS, BCC, right preauricular - Dr. ABA Harris                Social History     Socioeconomic History    Marital status: Single    Number of children: 0    Years of education: 16 college   Occupational History    Occupation: retired   Tobacco Use    Smoking status: Never    Smokeless tobacco: Never   Vaping Use    Vaping status: Never Used   Substance and Sexual Activity    Alcohol use: Yes     Comment: occ beer    Drug use: No    Sexual activity: Never   Other Topics Concern    Caffeine Concern Yes     Comment: some    Stress Concern No    Weight Concern No    Special Diet No    Exercise Yes     Comment: vigorous    Seat Belt Yes   Social History Narrative    Single    No kids    Retired sales (chemical sales to steel industry)    No tobacco, drugs    1 wine 5-6x/wk                  Physical Exam     ED Triage Vitals [01/17/25 2240]   BP (!) 167/80   Pulse 57   Resp 18   Temp 98.1 °F (36.7 °C)   Temp  src Temporal   SpO2 98 %   O2 Device None (Room air)       Current Vitals:   Vital Signs  BP: (!) 167/80  Pulse: 57  Resp: 18  Temp: 98.1 °F (36.7 °C)  Temp src: Temporal    Oxygen Therapy  SpO2: 98 %  O2 Device: None (Room air)        Physical Exam  HEENT; NCAT, EOMI, throat clear, neck supple, no LAD, no JVD, active bleeding noted from left nares at this time.  Heart S1S2 RRR  lungs: CTAB  abd: Soft NT, ND,  NABS without rebound or guarding  Ext no C/C/E    ED Course   Labs Reviewed - No data to display    ED Course as of 01/18/25 0057  ------------------------------------------------------------  Time: 01/18 0055  Comment: While here the patient had direct pressure placed on the nose after the initial clots were evacuated.  He initially had some hemostasis but bleeding restarted prior to cauterization.  At this time was felt we should place a anterior pack.  This was put in place but the patient was bleeding through so it was removed and a posterior 7.5 Rhino Rocket was put in place.  Hemostasis was achieved.              MDM      Differential diagnosis included anterior versus posterior bleed or septal perforation but not limited these.  The patient had a 7.5 cm Rhino Rocket balloon put in place with hemostasis achieved.  The patient's time will follow-up with Dr. Coombs his ENT.  This note was prepared using Dragon Medical voice recognition dictation software.  As a result errors may occur.  When identified to these areas have been corrected.  While every attempt is made to correct errors during dictation discrepancies may still exist.  Please contact if there are any errors.              Medical Decision Making      Disposition and Plan     Clinical Impression:  1. Epistaxis         Disposition:  Discharge  1/18/2025 12:57 am    Follow-up:  Porter Coombs MD  [address] [facility] [address]  [phone]    Schedule an appointment as soon as possible for a visit in 2 day(s)            Medications  Prescribed:  Current Discharge Medication List              Supplementary Documentation:

## 2025-01-18 NOTE — ED INITIAL ASSESSMENT (HPI)
Pt states here Wednesday for a nosebleed, states   \"They did a lot of things and I went home.\" Pt states around 9:30pm tonight his nose started bleeding and has been since. In triage, nose clamp in place. Pt takes 81mg aspirin daily.

## 2025-01-18 NOTE — ED QUICK NOTES
Pt continues to be comfortable, drainage remains minimal, pt has no needs at this time.    RR easy, unlabored.

## 2025-01-18 NOTE — PLAN OF CARE
Patient admitted via cart from ER.   Oriented to room.   Safety precautions initiated.   Bed in low position.  Call light in reach.     Skin assessment done with MARIAH Neves. No skin issues. All skin intact.

## 2025-01-18 NOTE — ED PROVIDER NOTES
Patient Seen in: [facility] Emergency Department      History     Chief Complaint   Patient presents with    Nose Bleed     Stated Complaint:     Subjective:   HPI      84-year-old male presents with epistaxis.  Reports bleeding from the left nasal passage.  States this is his third visit in the last 3 days for the same symptoms.  He was seen here last night and had a 7.5 cm Rhino Rocket placed in the left nasal passage.  Spoke with the treating physician at that time he states he watched him in the hallway for an hour and he had no recurrence of bleeding after placement.  The patient reports bleeding began when he got home.  No anticoagulation.    Objective:     Past Medical History:    Arteriosclerosis of thoracic aorta (HCC)    CT incidental December 2019    Benign non-nodular prostatic hyperplasia with lower urinary tract symptoms    By imaging and modest symptoms     Bilateral cataracts    Formatting of this note might be different from the original.  Overview:   MODEST   -2014/2015    Bilateral plantar fasciitis    BPH (benign prostatic hypertrophy)    CAD (coronary artery disease)    LAD   STENTED   JONAS  -  9/2013 DR ZABALA  -  45%  LEFT   MAIN ALSO   SEEN   LV  45%      Carotid artery plaque     15-49%    9/2019     30% bilateral December 2017    Cataract    COPD (chronic obstructive pulmonary disease) (HCC)    COVID-19    3 days of cough, runny nose, low saturations needing oxygen.    Elevated C-reactive protein (CRP)    Formatting of this note might be different from the original.  Overview:   September 2016 will repeat occurred during infection viral    Elevated lipoprotein(a)    Essential hypertension    Herpes    Hiatal hernia with GERD    History of migraine    EXERCISE X 4  SINCE 2012     Hyperlipidemia    Inguinal hernia    Lumbar radiculopathy    Surgery 3/2023    Mass of upper lobe of left lung    resolved    Mitral regurgitation    mild-last echo 1/2025    OA (osteoarthritis)    hips and  hands    Osteopenia    T     -2.1    2014     Pneumonia due to organism    Polycystic renal disease    Prediabetes    Skin cancer    New  Episode  Left  Cheek  -   9/2018  -    SQUAMOUS   NOSE.   F/U DR DEE  2X  YEARLY  -   WITH  RECENT   AK'S    Squamous cell carcinoma    nose x 3 and hand    Vitamin D deficiency              Past Surgical History:   Procedure Laterality Date    Angioplasty (coronary)  09/2013 9/13 cath 40% ostial LM, 80-90% mid to distal LAD (2.5 x 26mm Resolute Integrity) EF 45%    Appendectomy  1954    Cataract      Colonoscopy  01/2004    Colonoscopy N/A 10/31/2014    Procedure: COLONOSCOPY;  Surgeon: Ken Vinson MD;  Location:  ENDOSCOPY    Hernia surgery  1997    right    Injection, anesthetic/steroid, paravertebral facet joint/nerve; lumbar/sacral, single level  04/06/2022    Parminder    Ir aspiration/injection  04/28/2022    Parminder    Lumbar spine surgery  03/08/2023        Skin surgery  01/08/2020    MOHS SCCIS Right mid posterior scalp    Skin surgery  02/11/2020    MOHS, BCC, right preauricular - Dr. ABA Harris                Social History     Socioeconomic History    Marital status: Single    Number of children: 0    Years of education: 16 college   Occupational History    Occupation: retired   Tobacco Use    Smoking status: Never    Smokeless tobacco: Never   Vaping Use    Vaping status: Never Used   Substance and Sexual Activity    Alcohol use: Yes     Comment: occ beer    Drug use: No    Sexual activity: Never   Other Topics Concern    Caffeine Concern Yes     Comment: some    Stress Concern No    Weight Concern No    Special Diet No    Exercise Yes     Comment: vigorous    Seat Belt Yes   Social History Narrative    Single    No kids    Retired sales (chemical sales to steel industry)    No tobacco, drugs    1 wine 5-6x/wk                  Physical Exam     ED Triage Vitals [01/18/25 0605]   /77   Pulse 91   Resp 18   Temp 98.6 °F (37 °C)   Temp src    SpO2 98 %    O2 Device None (Room air)       Current Vitals:   Vital Signs  BP: (!) 151/94  Pulse: (!) 47  Resp: 18  Temp: 98.6 °F (37 °C)  MAP (mmHg): (!) 111    Oxygen Therapy  SpO2: 99 %  O2 Device: None (Room air)        Physical Exam  General:  Vitals as listed.    HEENT: The left nasal passage is occluded with clot and there is blood pouring from the left nasal passage.  Blood in the posterior pharynx.  Neck: supple, no rigidity   Lungs: good air exchange and clear   Heart: regular rate rhythm and no murmur   Abdomen: Soft and nontender.  No abdominal masses.  No peritoneal signs       ED Course     Labs Reviewed   COMP METABOLIC PANEL (14) - Abnormal; Notable for the following components:       Result Value    Glucose 107 (*)     Calculated Osmolality 300 (*)     All other components within normal limits   PROTHROMBIN TIME (PT) - Normal   PTT, ACTIVATED - Normal   CBC WITH DIFFERENTIAL WITH PLATELET   TYPE AND SCREEN    Narrative:     The following orders were created for panel order Type and screen.  Procedure                               Abnormality         Status                     ---------                               -----------         ------                     ABORH (Blood Type)[[id number]]                               In process                 Antibody Screen[[id number]]                                  In process                   Please view results for these tests on the individual orders.   ABORH (BLOOD TYPE)   ANTIBODY SCREEN   ABORH CONFIRMATION            The patient was unable to clear the clot in the left nasal passage and there was persistent bleeding both from the anterior nasal passage and into the back of the throat.  Ultimately I did place a 7.5 cm Rhino Rocket in the left nasal passage despite being unable to clear the clot.  This should push the clot posteriorly.       MDM      84-year-old male returns to the emergency room for the third time in the past 48 hours with epistaxis.  He is  actively bleeding around the 7-1/2 cm left-sided Rhino Rocket.  Rhino Rocket was removed and replaced.    Differential includes but is not limited to anemia, epistaxis, a life threat.    CBC, CMP, coagulation profile ordered for further evaluation.    There is no visible posterior epistaxis at this time.  He is having slight oozing anteriorly.  3 visits in 3 days for uncontrolled epistaxis.  Discussed with ENT and we will observe the patient in the hospital.  He is agreeable.  Discussed with admitting physician.            Admission disposition: 1/18/2025  9:45 AM           Medical Decision Making      Disposition and Plan     Clinical Impression:  1. Epistaxis         Disposition:  Admit  1/18/2025  9:45 am    Follow-up:  No follow-up provider specified.        Medications Prescribed:  Current Discharge Medication List              Supplementary Documentation:         Hospital Problems       Present on Admission  Date Reviewed: 1/15/2025            ICD-10-CM Noted POA    * (Principal) Epistaxis R04.0 1/18/2025 Unknown

## 2025-01-18 NOTE — H&P
[facility]     Jose Angel Kincaid Patient Status:  Emergency    7/10/1940 MRN [id number]   [facility] EMERGENCY DEPARTMENT Attending Alex Garcia MD   Hosp Day # 0 PCP Cl De Leon MD     Chief Complaint: Epistaxis     Subjective:    History of Present Illness:     Jose Angel Kincaid is a 84 year old male with past medical history of coronary artery disease presented to the emergency department with epistaxis.    Patient reports he got up to urinate Wednesday evening and his left nare suddenly started bleeding, he states it was dripping like a faucet, he presented to the emergency department had a Rhino Rocket placed, he went home and started bleeding again he presented again to the emergency department yesterday evening a larger Rhino Rocket was placed he went home again and then again this morning it started bleeding so he presented to the emergency department  He has no chest pain, no trouble breathing    History/Other:    Past Medical History:  Past Medical History:    Arteriosclerosis of thoracic aorta (HCC)    CT incidental 2019    Benign non-nodular prostatic hyperplasia with lower urinary tract symptoms    By imaging and modest symptoms     Bilateral cataracts    Formatting of this note might be different from the original.  Overview:   MODEST   -    Bilateral plantar fasciitis    BPH (benign prostatic hypertrophy)    CAD (coronary artery disease)    LAD   STENTED   JONAS  -  2013 DR ZABALA  -  45%  LEFT   MAIN ALSO   SEEN   LV  45%      Carotid artery plaque     15-49%    2019     30% bilateral 2017    Cataract    COPD (chronic obstructive pulmonary disease) ([facility])    COVID-19    3 days of cough, runny nose, low saturations needing oxygen.    Elevated C-reactive protein (CRP)    Formatting of this note might be different from the original.  Overview:   2016 will repeat occurred during infection viral    Elevated  lipoprotein(a)    Essential hypertension    Herpes    Hiatal hernia with GERD    History of migraine    EXERCISE X 4  SINCE 2012     Hyperlipidemia    Inguinal hernia    Lumbar radiculopathy    Surgery 3/2023    Mass of upper lobe of left lung    resolved    Mitral regurgitation    mild-last echo 1/2025    OA (osteoarthritis)    hips and hands    Osteopenia    T     -2.1    2014     Pneumonia due to organism    Polycystic renal disease    Prediabetes    Skin cancer    New  Episode  Left  Cheek  -   9/2018  -    SQUAMOUS   NOSE.   F/U DR DEE  2X  YEARLY  -   WITH  RECENT   AK'S    Squamous cell carcinoma    nose x 3 and hand    Vitamin D deficiency     Past Surgical History:   Past Surgical History:   Procedure Laterality Date    Angioplasty (coronary)  09/2013 9/13 cath 40% ostial LM, 80-90% mid to distal LAD (2.5 x 26mm Resolute Integrity) EF 45%    Appendectomy  1954    Cataract      Colonoscopy  01/2004    Colonoscopy N/A 10/31/2014    Procedure: COLONOSCOPY;  Surgeon: Ken Vinson MD;  Location:  ENDOSCOPY    Hernia surgery  1997    right    Injection, anesthetic/steroid, paravertebral facet joint/nerve; lumbar/sacral, single level  04/06/2022    Parminder    Ir aspiration/injection  04/28/2022    Parminder    Lumbar spine surgery  03/08/2023        Skin surgery  01/08/2020    MOHS SCCIS Right mid posterior scalp    Skin surgery  02/11/2020    MOHS, BCC, right preauricular - Dr. ABA Harris      Family History:   Family History   Problem Relation Age of Onset    Pacemaker Mother     Hypertension Mother      Social History:    reports that he has never smoked. He has never used smokeless tobacco. He reports current alcohol use. He reports that he does not use drugs.     Allergies: Allergies[1]    Medications:  Medications Ordered Prior to Encounter[2]    Review of Systems:   A comprehensive review of systems was completed.    Pertinent positives and negatives noted in the HPI.    Objective:   Physical Exam:     BP (!) 151/94   Pulse (!) 47   Temp 98.6 °F (37 °C)   Resp 18   Ht 5' 6\" (1.676 m)   Wt 147 lb (66.7 kg)   SpO2 99%   BMI 23.73 kg/m²   General: No acute distress, Alert  Respiratory: No rhonchi, no wheezes  Cardiovascular: S1, S2. Regular rate and rhythm  Abdomen: Soft, Non-tender, non-distended, positive bowel sounds  Neuro: No new focal deficits  Extremities: No edema      Results:    Labs:      Labs Last 24 Hours:    Recent Labs   Lab 01/18/25  0800   RBC 4.27   HGB 14.1   HCT 40.1   MCV 93.9   MCH 33.0   MCHC 35.2   RDW 12.1   NEPRELIM 4.39   WBC 7.5   .0       Recent Labs   Lab 01/18/25  0800   *   BUN 22   CREATSERUM 0.83   EGFRCR 86   CA 10.0   ALB 4.5      K 3.6      CO2 29.0   ALKPHO 78   AST 28   ALT 21   BILT 0.8   TP 7.6       Estimated Glomerular Filtration Rate: 86.3 mL/min/1.73m2 (by CKD-EPI based on SCr of 0.83 mg/dL).    Lab Results   Component Value Date    INR 1.11 01/18/2025    INR 1.09 02/21/2023       No results for input(s): \"TROP\", \"TROPHS\", \"CK\" in the last 168 hours.    No results for input(s): \"TROP\", \"PBNP\" in the last 168 hours.    No results for input(s): \"PCT\" in the last 168 hours.    Imaging: Imaging data reviewed in Epic.    Assessment & Plan:      #Recurrent Left nare Epistaxis   -Rhino rocket replaced   -ENT consulted in ED    #Acute blood loss anemia  -Hb WNL but down from previous  baseline     #CAD  -hold ASA  -Statin   -Metoprolol with holding parameters for sinus bradycardia     #Chronic HFpEF  -compensated  -recent ECHO 1/13 reviewed   -hold Bumex    #Essential HTN  -Amlodipine     #BPH  -flomax      Plan of care discussed with patient    Edvin Hu DO    Supplementary Documentation:     The 21st Century Cures Act makes medical notes like these available to patients in the interest of transparency. Please be advised this is a medical document. Medical documents are intended to carry relevant information, facts as evident, and the  clinical opinion of the practitioner. The medical note is intended as peer to peer communication and may appear blunt or direct. It is written in medical language and may contain abbreviations or verbiage that are unfamiliar.                                       [1]   Allergies  Allergen Reactions    Codeine OTHER (SEE COMMENTS)     Throat  tightness    Hctz [Hydrochlorothiazide] UNKNOWN    Nsaids OTHER (SEE COMMENTS)     GI    Zofran [Ondansetron] OTHER (SEE COMMENTS)     Constipation     Pepcid [Famotidine] OTHER (SEE COMMENTS)     neck and shoulder pains   [2]   Current Facility-Administered Medications on File Prior to Encounter   Medication Dose Route Frequency Provider Last Rate Last Admin    [COMPLETED] oxymetazoline (Nasal Decongestant) 0.05 % nasal solution 1 spray  1 spray Nasal Once David Lange MD   1 spray at 01/16/25 0414    [COMPLETED] tranexamic acid (Cyklokapron) 1000 MG/10ML for TOPICAL use 500 mg  5 mL Nasal Once David Lange MD   500 mg at 01/16/25 0401    [COMPLETED] regadenoson (Lexiscan) 0.4 mg/5mL injection        0.4 mg at 10/28/24 1045     Current Outpatient Medications on File Prior to Encounter   Medication Sig Dispense Refill    bumetanide (BUMEX) 1 MG Oral Tab One daily am 90 tablet 3    metoprolol succinate ER 25 MG Oral Tablet 24 Hr Take 1 tablet (25 mg total) by mouth in the morning and 1 tablet (25 mg total) before bedtime. 180 tablet 3    tamsulosin 0.4 MG Oral Cap TAKE ONE CAPSULE BY MOUTH ONE TIME DAILY 90 capsule 3    Omeprazole 40 MG Oral Capsule Delayed Release Take 1 capsule (40 mg total) by mouth daily. 90 capsule 3    ergocalciferol 1.25 MG (03753 UT) Oral Cap Take 1 capsule (50,000 Units total) by mouth every 14 (fourteen) days. 6 capsule 3    atorvastatin 40 MG Oral Tab Take 1 tablet (40 mg total) by mouth nightly. 90 tablet 3    Apoaequorin (PREVAGEN) 10 MG Oral Cap Take 1 tablet by mouth daily.      amLODIPine 5 MG Oral Tab Take 1 tablet (5 mg total) by mouth at  bedtime.      fluticasone propionate 50 MCG/ACT Inhalation Aerosol Powder, Breath Activated Inhale 1 puff into the lungs as needed. 1 each 3    aspirin 81 MG Oral Tab EC Take 1 tablet (81 mg total) by mouth every morning. 30 tablet 0    ascorbic acid 1000 MG Oral Tab Take 0.5 tablets (500 mg total) by mouth 2 (two) times daily.      Selenium 100 MCG Oral Tab Take 1 tablet (100 mcg total) by mouth every evening. 60 tablet 0    magnesium 250 MG Oral Tab Take 1 tablet (250 mg total) by mouth every other day. Magnesium oxide 250mg every other day. 30 tablet 0    omega-3 fatty acids 1000 MG Oral Cap Take 1,000 mg by mouth daily.      Nutritional Supplements (JUICE PLUS FIBRE OR) Take 2 tablets by mouth in the morning, at noon, and at bedtime.      Zinc 50 MG Oral Tab Take 1 tablet by mouth one time.      Coenzyme Q10 (COQ10) 100 MG Oral Cap Take 1 capsule by mouth at bedtime.      Glucosamine-Chondroit-Vit C-Mn (GLUCOSAMINE CHONDR 1500 COMPLX OR) Take 1 tablet by mouth 2 (two) times daily.      Calcium Carbonate-Vitamin D (CALTRATE 600+D OR) Take 1 tablet by mouth every other day.

## 2025-01-19 ENCOUNTER — ANESTHESIA EVENT (OUTPATIENT)
Dept: INTERVENTIONAL RADIOLOGY/VASCULAR | Facility: HOSPITAL | Age: 85
End: 2025-01-19
Payer: MEDICARE

## 2025-01-19 ENCOUNTER — APPOINTMENT (OUTPATIENT)
Dept: INTERVENTIONAL RADIOLOGY/VASCULAR | Facility: HOSPITAL | Age: 85
End: 2025-01-19
Attending: STUDENT IN AN ORGANIZED HEALTH CARE EDUCATION/TRAINING PROGRAM
Payer: MEDICARE

## 2025-01-19 ENCOUNTER — APPOINTMENT (OUTPATIENT)
Dept: CT IMAGING | Facility: HOSPITAL | Age: 85
DRG: 141 | End: 2025-01-19
Attending: HOSPITALIST
Payer: MEDICARE

## 2025-01-19 ENCOUNTER — APPOINTMENT (OUTPATIENT)
Dept: CT IMAGING | Facility: HOSPITAL | Age: 85
End: 2025-01-19
Attending: HOSPITALIST
Payer: MEDICARE

## 2025-01-19 ENCOUNTER — APPOINTMENT (OUTPATIENT)
Dept: INTERVENTIONAL RADIOLOGY/VASCULAR | Facility: HOSPITAL | Age: 85
DRG: 141 | End: 2025-01-19
Attending: STUDENT IN AN ORGANIZED HEALTH CARE EDUCATION/TRAINING PROGRAM
Payer: MEDICARE

## 2025-01-19 PROBLEM — R55 SYNCOPE AND COLLAPSE: Status: ACTIVE | Noted: 2025-01-19

## 2025-01-19 LAB
ANION GAP SERPL CALC-SCNC: 8 MMOL/L (ref 0–18)
ANION GAP SERPL CALC-SCNC: 9 MMOL/L (ref 0–18)
BASOPHILS # BLD AUTO: 0.04 X10(3) UL (ref 0–0.2)
BASOPHILS NFR BLD AUTO: 0.5 %
BUN BLD-MCNC: 18 MG/DL (ref 9–23)
BUN BLD-MCNC: 25 MG/DL (ref 9–23)
CALCIUM BLD-MCNC: 8.9 MG/DL (ref 8.7–10.6)
CALCIUM BLD-MCNC: 9.7 MG/DL (ref 8.7–10.6)
CHLORIDE SERPL-SCNC: 109 MMOL/L (ref 98–112)
CHLORIDE SERPL-SCNC: 111 MMOL/L (ref 98–112)
CO2 SERPL-SCNC: 25 MMOL/L (ref 21–32)
CO2 SERPL-SCNC: 26 MMOL/L (ref 21–32)
CREAT BLD-MCNC: 0.74 MG/DL
CREAT BLD-MCNC: 0.87 MG/DL
EGFRCR SERPLBLD CKD-EPI 2021: 85 ML/MIN/1.73M2 (ref 60–?)
EGFRCR SERPLBLD CKD-EPI 2021: 89 ML/MIN/1.73M2 (ref 60–?)
EOSINOPHIL # BLD AUTO: 0.04 X10(3) UL (ref 0–0.7)
EOSINOPHIL NFR BLD AUTO: 0.5 %
ERYTHROCYTE [DISTWIDTH] IN BLOOD BY AUTOMATED COUNT: 12.1 %
ERYTHROCYTE [DISTWIDTH] IN BLOOD BY AUTOMATED COUNT: 12.3 %
GLUCOSE BLD-MCNC: 110 MG/DL (ref 70–99)
GLUCOSE BLD-MCNC: 132 MG/DL (ref 70–99)
GLUCOSE BLD-MCNC: 137 MG/DL (ref 70–99)
HCT VFR BLD AUTO: 31.8 %
HCT VFR BLD AUTO: 34.6 %
HGB BLD-MCNC: 10.6 G/DL
HGB BLD-MCNC: 11.8 G/DL
IMM GRANULOCYTES # BLD AUTO: 0.04 X10(3) UL (ref 0–1)
IMM GRANULOCYTES NFR BLD: 0.5 %
LYMPHOCYTES # BLD AUTO: 1.46 X10(3) UL (ref 1–4)
LYMPHOCYTES NFR BLD AUTO: 16.6 %
MCH RBC QN AUTO: 32.7 PG (ref 26–34)
MCH RBC QN AUTO: 32.9 PG (ref 26–34)
MCHC RBC AUTO-ENTMCNC: 33.3 G/DL (ref 31–37)
MCHC RBC AUTO-ENTMCNC: 34.1 G/DL (ref 31–37)
MCV RBC AUTO: 96.4 FL
MCV RBC AUTO: 98.1 FL
MONOCYTES # BLD AUTO: 0.96 X10(3) UL (ref 0.1–1)
MONOCYTES NFR BLD AUTO: 10.9 %
NEUTROPHILS # BLD AUTO: 6.24 X10 (3) UL (ref 1.5–7.7)
NEUTROPHILS # BLD AUTO: 6.24 X10(3) UL (ref 1.5–7.7)
NEUTROPHILS NFR BLD AUTO: 71 %
OSMOLALITY SERPL CALC.SUM OF ELEC: 299 MOSM/KG (ref 275–295)
OSMOLALITY SERPL CALC.SUM OF ELEC: 306 MOSM/KG (ref 275–295)
PLATELET # BLD AUTO: 176 10(3)UL (ref 150–450)
PLATELET # BLD AUTO: 181 10(3)UL (ref 150–450)
PLATELETS.RETICULATED NFR BLD AUTO: 1.9 % (ref 0–7)
POTASSIUM SERPL-SCNC: 3.6 MMOL/L (ref 3.5–5.1)
POTASSIUM SERPL-SCNC: 3.7 MMOL/L (ref 3.5–5.1)
RBC # BLD AUTO: 3.24 X10(6)UL
RBC # BLD AUTO: 3.59 X10(6)UL
SODIUM SERPL-SCNC: 143 MMOL/L (ref 136–145)
SODIUM SERPL-SCNC: 145 MMOL/L (ref 136–145)
WBC # BLD AUTO: 8.8 X10(3) UL (ref 4–11)
WBC # BLD AUTO: 9.7 X10(3) UL (ref 4–11)

## 2025-01-19 PROCEDURE — 99233 SBSQ HOSP IP/OBS HIGH 50: CPT | Performed by: INTERNAL MEDICINE

## 2025-01-19 PROCEDURE — B3151ZZ FLUOROSCOPY OF BILATERAL COMMON CAROTID ARTERIES USING LOW OSMOLAR CONTRAST: ICD-10-PCS | Performed by: RADIOLOGY

## 2025-01-19 PROCEDURE — 70450 CT HEAD/BRAIN W/O DYE: CPT | Performed by: HOSPITALIST

## 2025-01-19 PROCEDURE — 99232 SBSQ HOSP IP/OBS MODERATE 35: CPT | Performed by: STUDENT IN AN ORGANIZED HEALTH CARE EDUCATION/TRAINING PROGRAM

## 2025-01-19 PROCEDURE — 03LM3DZ OCCLUSION OF RIGHT EXTERNAL CAROTID ARTERY WITH INTRALUMINAL DEVICE, PERCUTANEOUS APPROACH: ICD-10-PCS | Performed by: RADIOLOGY

## 2025-01-19 PROCEDURE — 99497 ADVNCD CARE PLAN 30 MIN: CPT | Performed by: INTERNAL MEDICINE

## 2025-01-19 PROCEDURE — 03LN3DZ OCCLUSION OF LEFT EXTERNAL CAROTID ARTERY WITH INTRALUMINAL DEVICE, PERCUTANEOUS APPROACH: ICD-10-PCS | Performed by: RADIOLOGY

## 2025-01-19 PROCEDURE — B31C1ZZ FLUOROSCOPY OF BILATERAL EXTERNAL CAROTID ARTERIES USING LOW OSMOLAR CONTRAST: ICD-10-PCS | Performed by: RADIOLOGY

## 2025-01-19 RX ORDER — LABETALOL HYDROCHLORIDE 5 MG/ML
10 INJECTION, SOLUTION INTRAVENOUS EVERY 10 MIN PRN
Status: DISCONTINUED | OUTPATIENT
Start: 2025-01-19 | End: 2025-01-24

## 2025-01-19 RX ORDER — NEOSTIGMINE METHYLSULFATE 1 MG/ML
INJECTION INTRAVENOUS AS NEEDED
Status: DISCONTINUED | OUTPATIENT
Start: 2025-01-19 | End: 2025-01-19 | Stop reason: SURG

## 2025-01-19 RX ORDER — LIDOCAINE HYDROCHLORIDE 10 MG/ML
INJECTION, SOLUTION EPIDURAL; INFILTRATION; INTRACAUDAL; PERINEURAL AS NEEDED
Status: DISCONTINUED | OUTPATIENT
Start: 2025-01-19 | End: 2025-01-19 | Stop reason: SURG

## 2025-01-19 RX ORDER — ROCURONIUM BROMIDE 10 MG/ML
INJECTION, SOLUTION INTRAVENOUS AS NEEDED
Status: DISCONTINUED | OUTPATIENT
Start: 2025-01-19 | End: 2025-01-19 | Stop reason: SURG

## 2025-01-19 RX ORDER — HEPARIN SODIUM 1000 [USP'U]/ML
INJECTION, SOLUTION INTRAVENOUS; SUBCUTANEOUS
Status: COMPLETED
Start: 2025-01-19 | End: 2025-01-19

## 2025-01-19 RX ORDER — CEFAZOLIN SODIUM 1 G/3ML
INJECTION, POWDER, FOR SOLUTION INTRAMUSCULAR; INTRAVENOUS AS NEEDED
Status: DISCONTINUED | OUTPATIENT
Start: 2025-01-19 | End: 2025-01-19 | Stop reason: SURG

## 2025-01-19 RX ORDER — PROTAMINE SULFATE 10 MG/ML
INJECTION, SOLUTION INTRAVENOUS AS NEEDED
Status: DISCONTINUED | OUTPATIENT
Start: 2025-01-19 | End: 2025-01-19 | Stop reason: SURG

## 2025-01-19 RX ORDER — ONDANSETRON 2 MG/ML
4 INJECTION INTRAMUSCULAR; INTRAVENOUS EVERY 6 HOURS PRN
Status: DISCONTINUED | OUTPATIENT
Start: 2025-01-19 | End: 2025-01-24

## 2025-01-19 RX ORDER — HEPARIN SODIUM 5000 [USP'U]/ML
INJECTION, SOLUTION INTRAVENOUS; SUBCUTANEOUS AS NEEDED
Status: DISCONTINUED | OUTPATIENT
Start: 2025-01-19 | End: 2025-01-19 | Stop reason: SURG

## 2025-01-19 RX ORDER — ACETAMINOPHEN 325 MG/1
650 TABLET ORAL EVERY 4 HOURS PRN
Status: DISCONTINUED | OUTPATIENT
Start: 2025-01-19 | End: 2025-01-24

## 2025-01-19 RX ORDER — PHENYLEPHRINE HCL 10 MG/ML
VIAL (ML) INJECTION AS NEEDED
Status: DISCONTINUED | OUTPATIENT
Start: 2025-01-19 | End: 2025-01-19 | Stop reason: SURG

## 2025-01-19 RX ORDER — PROTAMINE SULFATE 10 MG/ML
INJECTION, SOLUTION INTRAVENOUS
Status: COMPLETED
Start: 2025-01-19 | End: 2025-01-19

## 2025-01-19 RX ORDER — SODIUM CHLORIDE 9 MG/ML
INJECTION, SOLUTION INTRAVENOUS ONCE
Status: COMPLETED | OUTPATIENT
Start: 2025-01-19 | End: 2025-01-19

## 2025-01-19 RX ORDER — ACETAMINOPHEN 650 MG/1
650 SUPPOSITORY RECTAL EVERY 4 HOURS PRN
Status: DISCONTINUED | OUTPATIENT
Start: 2025-01-19 | End: 2025-01-24

## 2025-01-19 RX ORDER — METOCLOPRAMIDE HYDROCHLORIDE 5 MG/ML
10 INJECTION INTRAMUSCULAR; INTRAVENOUS EVERY 8 HOURS PRN
Status: DISCONTINUED | OUTPATIENT
Start: 2025-01-19 | End: 2025-01-24

## 2025-01-19 RX ORDER — SODIUM CHLORIDE, SODIUM LACTATE, POTASSIUM CHLORIDE, CALCIUM CHLORIDE 600; 310; 30; 20 MG/100ML; MG/100ML; MG/100ML; MG/100ML
INJECTION, SOLUTION INTRAVENOUS CONTINUOUS
Status: DISCONTINUED | OUTPATIENT
Start: 2025-01-19 | End: 2025-01-20

## 2025-01-19 RX ORDER — VERAPAMIL HYDROCHLORIDE 2.5 MG/ML
INJECTION, SOLUTION INTRAVENOUS
Status: DISCONTINUED
Start: 2025-01-19 | End: 2025-01-19 | Stop reason: WASHOUT

## 2025-01-19 RX ORDER — ONDANSETRON 2 MG/ML
INJECTION INTRAMUSCULAR; INTRAVENOUS AS NEEDED
Status: DISCONTINUED | OUTPATIENT
Start: 2025-01-19 | End: 2025-01-19 | Stop reason: SURG

## 2025-01-19 RX ORDER — NITROGLYCERIN 20 MG/100ML
INJECTION INTRAVENOUS
Status: COMPLETED
Start: 2025-01-19 | End: 2025-01-19

## 2025-01-19 RX ORDER — GLYCOPYRROLATE 0.2 MG/ML
INJECTION, SOLUTION INTRAMUSCULAR; INTRAVENOUS AS NEEDED
Status: DISCONTINUED | OUTPATIENT
Start: 2025-01-19 | End: 2025-01-19 | Stop reason: SURG

## 2025-01-19 RX ORDER — EPHEDRINE SULFATE 50 MG/ML
INJECTION INTRAVENOUS AS NEEDED
Status: DISCONTINUED | OUTPATIENT
Start: 2025-01-19 | End: 2025-01-19 | Stop reason: SURG

## 2025-01-19 RX ORDER — CAFFEINE CITRATE 20 MG/ML
SOLUTION ORAL
Status: COMPLETED
Start: 2025-01-19 | End: 2025-01-19

## 2025-01-19 RX ORDER — LIDOCAINE HYDROCHLORIDE 10 MG/ML
INJECTION, SOLUTION INFILTRATION; PERINEURAL
Status: COMPLETED
Start: 2025-01-19 | End: 2025-01-19

## 2025-01-19 RX ORDER — DEXAMETHASONE SODIUM PHOSPHATE 4 MG/ML
VIAL (ML) INJECTION AS NEEDED
Status: DISCONTINUED | OUTPATIENT
Start: 2025-01-19 | End: 2025-01-19 | Stop reason: SURG

## 2025-01-19 RX ORDER — IODIXANOL 320 MG/ML
250 INJECTION, SOLUTION INTRAVASCULAR
Status: COMPLETED | OUTPATIENT
Start: 2025-01-19 | End: 2025-01-19

## 2025-01-19 RX ADMIN — DEXAMETHASONE SODIUM PHOSPHATE 4 MG: 4 MG/ML VIAL (ML) INJECTION at 14:48:00

## 2025-01-19 RX ADMIN — GLYCOPYRROLATE 0.4 MG: 0.2 INJECTION, SOLUTION INTRAMUSCULAR; INTRAVENOUS at 16:16:00

## 2025-01-19 RX ADMIN — PROTAMINE SULFATE 15 MG: 10 INJECTION, SOLUTION INTRAVENOUS at 16:09:00

## 2025-01-19 RX ADMIN — CEFAZOLIN SODIUM 2 G: 1 INJECTION, POWDER, FOR SOLUTION INTRAMUSCULAR; INTRAVENOUS at 14:52:00

## 2025-01-19 RX ADMIN — PHENYLEPHRINE HCL 200 MCG: 10 MG/ML VIAL (ML) INJECTION at 14:51:00

## 2025-01-19 RX ADMIN — ROCURONIUM BROMIDE 10 MG: 10 INJECTION, SOLUTION INTRAVENOUS at 15:05:00

## 2025-01-19 RX ADMIN — ROCURONIUM BROMIDE 20 MG: 10 INJECTION, SOLUTION INTRAVENOUS at 14:49:00

## 2025-01-19 RX ADMIN — SODIUM CHLORIDE: 9 INJECTION, SOLUTION INTRAVENOUS at 14:43:00

## 2025-01-19 RX ADMIN — EPHEDRINE SULFATE 10 MG: 50 INJECTION INTRAVENOUS at 14:54:00

## 2025-01-19 RX ADMIN — ONDANSETRON 4 MG: 2 INJECTION INTRAMUSCULAR; INTRAVENOUS at 16:16:00

## 2025-01-19 RX ADMIN — LIDOCAINE HYDROCHLORIDE 50 MG: 10 INJECTION, SOLUTION EPIDURAL; INFILTRATION; INTRACAUDAL; PERINEURAL at 14:43:00

## 2025-01-19 RX ADMIN — NEOSTIGMINE METHYLSULFATE 3 MG: 1 INJECTION INTRAVENOUS at 16:16:00

## 2025-01-19 RX ADMIN — HEPARIN SODIUM 2000 UNITS: 5000 INJECTION, SOLUTION INTRAVENOUS; SUBCUTANEOUS at 15:29:00

## 2025-01-19 RX ADMIN — SODIUM CHLORIDE: 9 INJECTION, SOLUTION INTRAVENOUS at 16:16:00

## 2025-01-19 NOTE — PROGRESS NOTES
Significant Event - Fall Note    Date/Time of Fall: January 19th   , at 0755    Fall huddle completed: Yes    Description of patient fall:     Patient fell from: Toilet     Activity when fall occurred: Toileting-related activities     Where did fall occur: Bathroom     Was the fall assisted: Found on floor/unassisted to floor    Who witnessed the fall: Staff    Patient narrative of fall: \"I was washing my face in the sink. I then went and sat on the toilet. I stood up from the toilet, felt woozy and then I fell.\"     Staff narrative of fall: This RN did not witness the fall. When I arrived to the pts room, he was on the floor and he was conscious. RRT called. A few seconds later, pt passed out. Multiple staff lifted pt up into the bed. The pt came to a few seconds later in the bed. Pt answered all orientation questions correctly. Vital signs obtained and WNL. Sugar taken, WNL. Pt has skin tear to the R arm covered with gauze and tape and R knee covered with a band-aid. Laceration to R eyebrow. Covered with steri strips and ointment. STAT CT ordered. This RN accompanied transport down to CT. Pt reports no pain.   This RN asked the tech to help pt to the bathroom to wash up. Pt was using the bathroom multiple times last night without dizziness per the NOC RN. Pt was not dizzy when sitting up or standing up on the way to the bathroom. Pt was also not dizzy in the bathroom until he stood up from the toilet. Per the tech in the room, pt asked for privacy on the toilet. Tech stayed outside the pts bathroom (in the pts room) the whole time.     Name of Provider notified of fall: Son Trinidad (ENT) and on the on call hospitalist.     Family notification: Patient declined    Factors contributing to fall:     Physical: Dizziness     Psychological: Alert     Environmental: N/A     Medications received in the past 8 hours:   Medication(s) Administered in past 8 Hours from 01/19/2025 7225 to 01/19/2025 1215       Date/Time Order  Dose Route Action Action by Comments    01/19/2025 1132 CST ceFAZolin (Ancef) 2g in 10mL IV syringe premix 2 g Intravenous New Bag Madyson Olvera RN --    01/19/2025 1132 CST lactated ringers infusion -- Intravenous New Bag Madyson Olvera RN --    01/19/2025 0843 CST metoprolol succinate ER (Toprol XL) 24 hr tab 25 mg 25 mg Oral Given Madyson Olvera RN --    01/19/2025 0848 CST sodium ferric gluconate (Ferrlecit) 125 mg in sodium chloride 0.9% 100mL IVPB premix 125 mg Intravenous New Bag Madyson Olvera RN --            Was patient identified as high fall risk prior to fall: Yes                               What interventions were in place prior to fall: Bed in lowest position, Call light within reach, Low bed ([facility] only), Patient situated close to nursing station, Rounding, and Toileting regimen. Staff to assist pt to bathroom.     Interventions post fall: Bed alarm, Low bed ([facility] only), Patient situated close to nursing station, Rounding, and Pt instructed he cannot get up.     Additional comments: This RN took care of pt yesterday. Pt was up walking in hallway and to the bathroom without dizziness all day. Pt was using bathroom all night with no dizziness. Even on the way to the bathroom this morning pt says he was not dizzy.

## 2025-01-19 NOTE — PROGRESS NOTES
Patient alert x 4, bilateral rhino rockets in place. Still bleeding from nose. 700mL bright red output from suction cannister at 0000. An additional 900mL bright red output from suction cannister at 0500. Pt NPO since Friday night. Using water to gargle and suction out blood from throat. No blood thinners. Ambulates SBA. Voiding without difficulty. Plan of care discussed with patient, all questions addressed. Call light within reach.    ENT paged and aware about bleeding, updated throughout the night about patient status, orders for CBC in the morning and IR procedure. Charge RN also in contact with ENT Dr for patient updates.

## 2025-01-19 NOTE — ANESTHESIA POSTPROCEDURE EVALUATION
[facility]    Jose Angel Kincaid Patient Status:  Inpatient   Age/Gender 84 year old male MRN [id number]   Location [facility] INTERVENTIONAL SUITES Attending Edvin Hu,    Hosp Day # 0 PCP Cl De Leon MD       Anesthesia Post-op Note        Procedure Summary       Date: 01/19/25 Room / Location: [facility] Interventional Suites    Anesthesia Start: 1431 Anesthesia Stop:     Procedure: IR HEAD AND NECK EMBOLIZATION Diagnosis: (Epistaxis)    Scheduled Providers:  Anesthesiologist: Gilberto Shaver MD    Anesthesia Type: general ASA Status: 3            Anesthesia Type: No value filed.    Vitals Value Taken Time   /62 01/19/25 1634   Temp 97.5 degrees F 01/19/25 1634   Pulse 73 01/19/25 1634   Resp 16 01/19/25 1634   SpO2 98% 01/19/25 1634           Patient Location: ICU    Anesthesia Type: general    Airway Patency: patent and extubated    Postop Pain Control: adequate    Mental Status: mildly sedated but able to meaningfully participate in the post-anesthesia evaluation    Nausea/Vomiting: none    Cardiopulmonary/Hydration status: stable euvolemic    Complications: no apparent anesthesia related complications    Postop vital signs: stable    Dental Exam: Unchanged from Preop    Sign out given to ICU staff.

## 2025-01-19 NOTE — PROGRESS NOTES
[facility]   part of [facility]    Progress Note    Jose Angel Kincaid Patient Status:  Inpatient    7/10/1940 MRN [id number]   Location [facility] 3NW-A Attending Edvin Hu,    Hosp Day # 0 PCP Cl De Leon MD     Chief Complaint: Epistaxis    Subjective:     All other systems reviewed and are negative.  Bleeding overnight, RN reported 900cc in canister, fell this AM with laceration to right temple    Objective:   Blood pressure 124/60, pulse 73, temperature 98.7 °F (37.1 °C), temperature source Oral, resp. rate 20, height 5' 6\" (1.676 m), weight 147 lb (66.7 kg), SpO2 98%.  Physical Exam  HENT:      Nose:      Comments: Bilateral RR in place, no current bleeding     Mouth/Throat:      Comments: Small clot in posterior pharynx, otherwise clear of blood        Results:   Lab Results   Component Value Date    WBC 8.8 2025    HGB 11.8 (L) 2025    HCT 34.6 (L) 2025    .0 2025    CREATSERUM 0.74 2025    BUN 18 2025     2025    K 3.6 2025     2025    CO2 25.0 2025     (H) 2025    CA 9.7 2025    ALB 4.5 2025    ALKPHO 78 2025    BILT 0.8 2025    TP 7.6 2025    AST 28 2025    ALT 21 2025    PTT 25.6 2025    INR 1.15 2025    T4F 1.0 2018    TSH 4.230 2024    PSA 2.77 03/15/2024    CRP <0.29 2018    B12 508 03/15/2024       CT BRAIN OR HEAD (CPT=70450)    Result Date: 2025  PROCEDURE:  CT BRAIN OR HEAD (66547)  COMPARISON:  None.  INDICATIONS:  Fall, laceration, headache  TECHNIQUE:  Noncontrast CT scanning is performed through the brain. Dose reduction techniques were used. Dose information is transmitted to the ACR (American College of Radiology) NRDR (National Radiology Data Registry) which includes the Dose Index Registry.  PATIENT STATED HISTORY: (As transcribed by Technologist)  Fall, laceration to right eyebrow    FINDINGS: No  evidence of intracranial hemorrhage or extra-axial fluid collection. Lucencies in the deep periventricular white matter are likely sequelae of chronic small vessel ischemic disease. Prominence of the sulci is noted. No mass effect. Air-fluid levels in bilateral maxillary sinuses is noted.  Mucoperiosteal thickening of the sphenoid sinus and ethmoid air cells along with frontal sinuses is noted.  Visualized portions of the mastoid air cells are unremarkable. Visualized portions of the orbits are unremarkable. IMPRESSION: Sequelae of chronic small vessel ischemic disease is noted. No evidence of intracranial hemorrhage or extra-axial fluid collection.    LOCATION:  [facility]   Dictated by (CST): Alexander Strickland MD on 1/19/2025 at 8:25 AM     Finalized by (CST): Alexander Strickland MD on 1/19/2025 at 8:27 AM            Assessment & Plan:     Epistaxis  83 y/o with persistent left sided epistaxis s/p bilateral 7.5cm rhinorocket placement 1/18    Recommendations  -Suspect posterior epistaxis, planning for IR embolization this afternoon, appreciate neuro IR assistance  -Will work towards pulling packings after procedure  -Abx while packings in place  -Trend H/H  -Please call with questions    Son Trinidad MD  1/19/2025

## 2025-01-19 NOTE — ANESTHESIA PROCEDURE NOTES
Airway  Date/Time: 1/19/2025 2:44 PM  Urgency: elective      General Information and Staff    Patient location during procedure: OR  Anesthesiologist: Gilberto Shaver MD  Performed: anesthesiologist   Performed by: Gilberto Shaver MD  Authorized by: Gilberto Shaver MD      Indications and Patient Condition  Indications for airway management: anesthesia  Sedation level: deep  Preoxygenated: yes  Patient position: sniffing  Mask difficulty assessment: 0 - not attempted    Final Airway Details  Final airway type: endotracheal airway      Successful airway: ETT  Cuffed: yes   Successful intubation technique: direct laryngoscopy  Facilitating devices/methods: rapid sequence intubation  Endotracheal tube insertion site: oral  Blade: Tereza  Blade size: #3  ETT size (mm): 7.5    Cormack-Lehane Classification: grade I - full view of glottis  Placement verified by: capnometry   Measured from: lips  ETT to lips (cm): 21  Number of attempts at approach: 1

## 2025-01-19 NOTE — PROGRESS NOTES
Received patient from IR. Alert and oriented. RA VSS. Neuro q 15 for 1 hour/ groin site soft and the dressing c/d/I.

## 2025-01-19 NOTE — PROCEDURES
[facility]   part of [facility]     Pre-Procedure Note  Jose Angel Kincaid Patient Status:  Inpatient    7/10/1940 MRN [id number]   Location [facility] INTERVENTIONAL SUITES Attending Edvin Hu,    Hosp Day # 0 PCP Cl De Leon MD     Pre-Op Diagnosis:  Recurrent severe epistaxis with ongoing bleeding despite bilateral packing and cautery.    Indication: see above.    Planned Procedure:  cervicocerebral angiography and embolization for epistaxis    Surgeon: SUAD Davis.    Informed Consent: Informed consent was obtained from the patient after explaining the procedure, it's rationale and risks including but not limited to infection, bleeding, contrast rxn/nephropathy, vascular injury, stroke, and cranial nerve injury.  The patient expressed an understanding of the procedure and its risks and a willingness to undergo the procedure.    Anesthesia:  Local and General    Lab Results   Component Value Date    WBC 9.7 2025    HGB 10.6 2025    HCT 31.8 2025    .0 2025    CREATSERUM 0.74 2025    BUN 18 2025     2025    K 3.6 2025     2025    CO2 25.0 2025     2025    CA 9.7 2025    INR 1.15 2025    PTP 14.9 2025    PGLU 137 2025     /72 (BP Location: Right arm)   Pulse 75   Temp 99.2 °F (37.3 °C) (Oral)   Resp 20   Ht 66\"   Wt 147 lb (66.7 kg)   SpO2 100%   BMI 23.73 kg/m²       Hero Davis MD  2025

## 2025-01-19 NOTE — ANESTHESIA PREPROCEDURE EVALUATION
PRE-OP EVALUATION    Patient Name: Jose Angel Kincaid    Admit Diagnosis: Epistaxis [R04.0]    Pre-op Diagnosis: * No pre-op diagnosis entered *    Anesthesia Procedure: IR HEAD AND NECK EMBOLIZATION    * No surgeons found in log *    Pre-op vitals reviewed.  Temp: 99.2 °F (37.3 °C)  Pulse: 75  Resp: 20  BP: 146/72  SpO2: 100 %  Body mass index is 23.73 kg/m².    Current medications reviewed.  Hospital Medications:   sodium ferric gluconate (Ferrlecit) 125 mg in sodium chloride 0.9% 100mL IVPB premix  125 mg Intravenous Daily    [COMPLETED] sodium chloride 0.9% infusion   Intravenous Once    lactated ringers infusion   Intravenous Continuous    ceFAZolin (Ancef) 2g in 10mL IV syringe premix  2 g Intravenous Q8H    [COMPLETED] heparin (Porcine) 1000 UNIT/ML injection        [COMPLETED] sodium bicarbonate (infant) 4.2 % injection        [COMPLETED] verapamil (Isoptin) 2.5 mg/mL injection        [COMPLETED] lidocaine (Xylocaine) 1 % injection        amLODIPine (Norvasc) tab 5 mg  5 mg Oral Nightly    tamsulosin (Flomax) cap 0.4 mg  0.4 mg Oral Daily    pantoprazole (Protonix) DR tab 40 mg  40 mg Oral QAM AC    atorvastatin (Lipitor) tab 40 mg  40 mg Oral Nightly    acetaminophen (Tylenol Extra Strength) tab 1,000 mg  1,000 mg Oral Q8H PRN    melatonin tab 3 mg  3 mg Oral Nightly PRN    metoprolol succinate ER (Toprol XL) 24 hr tab 25 mg  25 mg Oral BID    metoclopramide (Reglan) 5 mg/mL injection 5 mg  5 mg Intravenous Q8H PRN    polyethylene glycol (PEG 3350) (Miralax) 17 g oral packet 17 g  17 g Oral Daily PRN    sennosides (Senokot) tab 17.2 mg  17.2 mg Oral Nightly PRN    bisacodyl (Dulcolax) 10 MG rectal suppository 10 mg  10 mg Rectal Daily PRN    fleet enema (Fleet) rectal enema 133 mL  1 enema Rectal Once PRN    benzonatate (Tessalon) cap 200 mg  200 mg Oral TID PRN    guaiFENesin (Robitussin) 100 MG/5 ML oral liquid 200 mg  200 mg Oral Q4H PRN    glycerin-hypromellose- (Artificial Tears) 0.2-0.2-1 %  ophthalmic solution 1 drop  1 drop Both Eyes QID PRN    sodium chloride (Saline Mist) 0.65 % nasal solution 1 spray  1 spray Each Nare Q3H PRN       Outpatient Medications:   Prescriptions Prior to Admission[1]    Allergies: Codeine, Hctz [hydrochlorothiazide], Nsaids, Zofran [ondansetron], and Pepcid [famotidine]      Anesthesia Evaluation    Patient summary reviewed.    Anesthetic Complications  (-) history of anesthetic complications         GI/Hepatic/Renal      (+) GERD                           Cardiovascular  Comment: Echo:  Conclusions:     1. Left ventricle: The cavity size was normal. Wall thickness was normal.      Systolic function was normal. The estimated ejection fraction was 60-65%,      by visual assessment. No diagnostic evidence for regional wall motion      abnormalities. Doppler parameters are consistent with abnormal left      ventricular relaxation - grade 1 diastolic dysfunction.   2. Left atrium: The left atrial volume was normal.   3. Aortic valve: The peak systolic velocity was 1.34m/sec. The mean systolic      gradient was 4mm Hg.   4. Mitral valve: There was mild regurgitation.   5. Pulmonary arteries: Systolic pressure was mildly increased, in the range      of 30mm Hg to 35mm Hg.   Impressions:  No previous study from [facility] was   available for comparison.   *     Stress test:     CONCLUSION:  1.Normal vasodilator myocardial perfusion imaging study.    2.No evidence of myocardial ischemia. Small apical defect likely relates to physiologic apical thinning.   3.   No evidence of myocardial infarction. Fixed inferior defect due to diaphragmatic attenuation artifact.  4.The gated SPECT images demonstrate normal left ventricular volumes and systolic function with an estimated ejection fraction of 60%.   5.   Normal hemodynamic and ECG response to vasodilator stress.  6.   No prior study for comparison.           ECG reviewed.  Exercise tolerance: good     MET:  >4      (+) hypertension     (+) CAD (s/p stent placement)                  (-) angina              Endo/Other                                  Pulmonary        (+) COPD   COPD not requiring home oxygen.                Neuro/Psych                              Patient Active Problem List:     Polycystic kidney     Skin cancer     CAD (coronary artery disease)     History of migraine     Osteopenia     Carotid artery plaque     Dyslipidemia     Benign prostatic hyperplasia     Elevated lipoprotein(a)     Arteriosclerosis of thoracic aorta (HCC)     Benign essential HTN     Hiatal hernia with GERD     COPD (chronic obstructive pulmonary disease) (HCC)     Lumbar radiculopathy     Elevated C-reactive protein (CRP)     Bilateral cataracts     Lumbar spondylosis     Prediabetes     Mitral regurgitation     Epistaxis     Syncope and collapse            Past Surgical History:   Procedure Laterality Date    Angioplasty (coronary)  09/2013 9/13 cath 40% ostial LM, 80-90% mid to distal LAD (2.5 x 26mm Resolute Integrity) EF 45%    Appendectomy  1954    Cataract      Colonoscopy  01/2004    Colonoscopy N/A 10/31/2014    Procedure: COLONOSCOPY;  Surgeon: Ken Vinson MD;  Location:  ENDOSCOPY    Hernia surgery  1997    right    Injection, anesthetic/steroid, paravertebral facet joint/nerve; lumbar/sacral, single level  04/06/2022    Parminder    Ir angiogram cerebral carotid bilateral  1/19/2025    Ir aspiration/injection  04/28/2022    Parminder    Ir head and neck embolization  1/19/2025    Lumbar spine surgery  03/08/2023        Skin surgery  01/08/2020    MOHS SCCIS Right mid posterior scalp    Skin surgery  02/11/2020    MOHS, BCC, right preauricular - Dr. ABA Harris     Social History     Socioeconomic History    Marital status: Single    Number of children: 0    Years of education: 16 college   Occupational History    Occupation: retired   Tobacco Use    Smoking status: Never    Smokeless tobacco: Never   Vaping Use     Vaping status: Never Used   Substance and Sexual Activity    Alcohol use: Yes     Comment: occ beer    Drug use: No    Sexual activity: Never   Other Topics Concern    Caffeine Concern Yes     Comment: some    Stress Concern No    Weight Concern No    Special Diet No    Exercise Yes     Comment: vigorous    Seat Belt Yes     History   Drug Use No     Available pre-op labs reviewed.  Lab Results   Component Value Date    WBC 9.7 01/19/2025    RBC 3.24 (L) 01/19/2025    HGB 10.6 (L) 01/19/2025    HCT 31.8 (L) 01/19/2025    MCV 98.1 01/19/2025    MCH 32.7 01/19/2025    MCHC 33.3 01/19/2025    RDW 12.3 01/19/2025    .0 01/19/2025     Lab Results   Component Value Date     01/19/2025    K 3.6 01/19/2025     01/19/2025    CO2 25.0 01/19/2025    BUN 18 01/19/2025    CREATSERUM 0.74 01/19/2025     (H) 01/19/2025    CA 9.7 01/19/2025     Lab Results   Component Value Date    INR 1.15 01/18/2025         Airway      Mallampati: II  Mouth opening: 3 FB  TM distance: 4 - 6 cm  Neck ROM: full Cardiovascular      Rhythm: regular  Rate: normal     Dental             Pulmonary      Breath sounds clear to auscultation bilaterally.               Other findings  Bilateral nostrils packed, laceration above left eyebrow with steri strips             ASA: 3   Plan: general  NPO status verified and patient meets guidelines.  Patient has taken beta blockers in last 24 hours.  Post-procedure pain management plan discussed with surgeon and patient.      Plan/risks discussed with: patient                Present on Admission:  **None**             [1]   Medications Prior to Admission   Medication Sig Dispense Refill Last Dose/Taking    bumetanide (BUMEX) 1 MG Oral Tab One daily am 90 tablet 3 1/17/2025    metoprolol succinate ER 25 MG Oral Tablet 24 Hr Take 1 tablet (25 mg total) by mouth in the morning and 1 tablet (25 mg total) before bedtime. 180 tablet 3 1/17/2025 Evening    tamsulosin 0.4 MG Oral Cap TAKE ONE  CAPSULE BY MOUTH ONE TIME DAILY 90 capsule 3 1/17/2025    Omeprazole 40 MG Oral Capsule Delayed Release Take 1 capsule (40 mg total) by mouth daily. 90 capsule 3 1/17/2025    atorvastatin 40 MG Oral Tab Take 1 tablet (40 mg total) by mouth nightly. 90 tablet 3 1/17/2025 Evening    amLODIPine 5 MG Oral Tab Take 1 tablet (5 mg total) by mouth at bedtime.   1/17/2025 Bedtime    aspirin 81 MG Oral Tab EC Take 1 tablet (81 mg total) by mouth every morning. 30 tablet 0 Past Week    ergocalciferol 1.25 MG (17517 UT) Oral Cap Take 1 capsule (50,000 Units total) by mouth every 14 (fourteen) days. 6 capsule 3     Apoaequorin (PREVAGEN) 10 MG Oral Cap Take 1 tablet by mouth daily.       fluticasone propionate 50 MCG/ACT Inhalation Aerosol Powder, Breath Activated Inhale 1 puff into the lungs as needed. 1 each 3     ascorbic acid 1000 MG Oral Tab Take 0.5 tablets (500 mg total) by mouth 2 (two) times daily.       Selenium 100 MCG Oral Tab Take 1 tablet (100 mcg total) by mouth every evening. 60 tablet 0     magnesium 250 MG Oral Tab Take 1 tablet (250 mg total) by mouth every other day. Magnesium oxide 250mg every other day. 30 tablet 0     omega-3 fatty acids 1000 MG Oral Cap Take 1,000 mg by mouth daily.       Nutritional Supplements (JUICE PLUS FIBRE OR) Take 2 tablets by mouth in the morning, at noon, and at bedtime.       Zinc 50 MG Oral Tab Take 1 tablet by mouth one time.       Coenzyme Q10 (COQ10) 100 MG Oral Cap Take 1 capsule by mouth at bedtime.       Glucosamine-Chondroit-Vit C-Mn (GLUCOSAMINE CHONDR 1500 COMPLX OR) Take 1 tablet by mouth 2 (two) times daily.       Calcium Carbonate-Vitamin D (CALTRATE 600+D OR) Take 1 tablet by mouth every other day.

## 2025-01-19 NOTE — PROCEDURES
[facility]   part of [facility]     Neurointerventional Surgery Operative Report  Jose Angel Kincaid Patient Status:  Inpatient    7/10/1940 MRN [id number]   Location [facility] INTERVENTIONAL SUITES Attending Edvin Hu,    Hosp Day # 0 PCP Cl De Leon MD     Procedure: cervicocerebral angiography and embolization for epistaxis    Physicians: SUAD Davis.    Technique: Sterile technique, US guidance and fluoroscopic guidance    Anesthesia: Local and General    Procedure Details   Informed consent was obtained from the patient after explaining the procedure, it's rationale and risks including but not limited to infection, bleeding, contrast rxn/nephropathy, vascular injury, stroke, and cranial nerve injury. The patient expressed an understanding of the procedure and its risks and a willingness to undergo the procedure.  The patient was brought to the inteventional suite where a standard timeout procedure was performed prior to commencing the procedure.  At the termination of the procedure, the devices were removed and hemostasis was achieved using Mynx  device.    Findings:  A full report is to follow.  The preliminary findings are :  - No variant anatomy.  - No pseudoaneurysm or contrast extravasation.  - successful embolization of bilatera IMAX.    The findings were discussed with:  Message sent to Dr. Trinidad.    Estimated Blood Loss:  Minimal         Complications:  None; patient tolerated the procedure well.           Disposition: ICU - extubated and stable.           Condition: stable    Plan: The patient will be sent directly to the Neurospine ICU.      Hero Davis MD  2025

## 2025-01-19 NOTE — CODE DOCUMENTATION
[facility]  RAPID RESPONSE NOTE     Jose Angel Kincaid Patient Status:  Observation    7/10/1940 MRN [id number]   Location [facility] 3NW-A Attending Edvin Hu,    Hosp Day # 0 PCP Cl De Leon MD     Reason for RRT: Past out and hit his head. Here for uncontrolled epistaxis    Physical Exam:    /79 (BP Location: Right arm)   Pulse 88   Temp 98 °F (36.7 °C) (Oral)   Resp 20   Ht 5' 6\" (1.676 m)   Wt 147 lb (66.7 kg)   SpO2 98%   BMI 23.73 kg/m²   General: pt awake and alert  Respiratory: CTAB  Cardiovascular: CVS  Abdomen: Soft NT/ND  Neurologic: No focal defiecits  Extremities: No C/C/E    Diagnostic Data:      Labs: Hgb 11.8    Imaging: CT head:    ASSESSMENT / PLAN:     Syncopal episode  - Likely vasovagal  - STAT CT Head  - 500cc Bolus IVF  - repeat cbc AT 11:00 am  - updated neurointerventionalist Dr. Davis this morning.  - Plan for embolization at 1:pm  - will update Dr. Hu  - pt will need a couple stitches in ER later today for the laceration above the rt eye.   - bandage applied to skin tear on rt arm.    Upon my evaluation, this patient had a high probability of imminent or life-threatening deterioration due to uncontolled epistaxis with syncopal episode resulting in traumatic fall which required my direct attention, intervention, and personal management.    I have personally provided 35 minutes of critical care time, exclusive of time spent on separately billable procedures.  Time includes review of all pertinent laboratory/radiology results, discussion with consultants, and monitoring for potential decompensation.  Performed interventions included IVF, CT Head, repeat hgb level.     Critical care time: 35 minutes    [id number]    Nirav Acuna,   2025

## 2025-01-19 NOTE — PROGRESS NOTES
Patient continues to bleed from nose with 2x rhino rockets intact. Blood is saturating mustache dressing in minutes. Pt gargling with water and using oral suction to remove blood. MD paged, plan for IR procedure with Dr Monahan in the morning. Labs drawn, vitals stable at this time, will continue to monitor patient.

## 2025-01-19 NOTE — PROGRESS NOTES
[facility]   part of [facility]     Hospitalist Progress Note     Jose Angel Kincaid Patient Status:  Observation    7/10/1940 MRN [id number]   Location [facility] 3NW-A Attending Edvin Hu,    Hosp Day # 0 PCP Cl De Leon MD     Chief Complaint: nose bleed  Subjective:     Events this morning reviewed  Ongoing epistaxis     Objective:    Review of Systems:   A comprehensive review of systems was completed; pertinent positive and negatives stated in subjective.    Vital signs:  Temp:  [98 °F (36.7 °C)-99.4 °F (37.4 °C)] 98 °F (36.7 °C)  Pulse:  [42-88] 88  Resp:  [18-20] 20  BP: (135-172)/(70-96) 150/79  SpO2:  [97 %-100 %] 98 %    Physical Exam:    General: No acute distress, right forehead laceration   Respiratory: No wheezes, no rhonchi  Cardiovascular: S1, S2, regular rate and rhythm  Abdomen: Soft, Non-tender, non-distended, positive bowel sounds  Neuro: No new focal deficits.   Extremities: No edema      Diagnostic Data:    Labs:  Recent Labs   Lab 25  0800 25  0627   WBC 7.5 7.6 8.8   HGB 14.1 11.8* 11.8*   MCV 93.9 95.3 96.4   .0 168.0 181.0   INR 1.11 1.15  --        Recent Labs   Lab 25  0825  0627   * 110*   BUN 22 18   CREATSERUM 0.83 0.74   CA 10.0 9.7   ALB 4.5  --     143   K 3.6 3.6    109   CO2 29.0 25.0   ALKPHO 78  --    AST 28  --    ALT 21  --    BILT 0.8  --    TP 7.6  --        Estimated Glomerular Filtration Rate: 89.3 mL/min/1.73m2 (by CKD-EPI based on SCr of 0.74 mg/dL).    No results for input(s): \"TROP\", \"TROPHS\", \"CK\" in the last 168 hours.    Recent Labs   Lab 25  0800 25  210   PTP 14.4 14.9*   INR 1.11 1.15                  Microbiology    No results found for this visit on 25.      Imaging: Reviewed in Epic.    Medications:    sodium ferric gluconate  125 mg Intravenous Daily    amLODIPine  5 mg Oral Nightly    tamsulosin  0.4 mg Oral Daily    pantoprazole  40 mg Oral QAM AC     atorvastatin  40 mg Oral Nightly    metoprolol succinate ER  25 mg Oral BID       Assessment & Plan:      #Recurrent Left nare Epistaxis   -ENT on consult, IR embolization planned  -bilateral rhino rockets in place, initial in place for past 3 days will start TSS ppx, Cefazolin     #Acute blood loss anemia  -supplement Fe  -monitor and transfuse with goal >7    #Syncope 2/2 above with forehead laceration  -IVF     #CAD  -hold ASA  -Statin   -Metoprolol     #Chronic HFpEF  -compensated  -recent ECHO 1/13 reviewed   -hold Bumex     #Essential HTN  -Amlodipine      #BPH  -flomax    #ACP  No advanced directives on file  -17 minutes time spent in advanced care planning  Reviewed possibility of airway obstruction and need for CPR and mechanical ventilation, patient agreeable, Full Code  Discussion was voluntary and face to face        Edvin Hu DO    Supplementary Documentation:     Quality:  DVT Mechanical Prophylaxis:   SCDs, Early ambuation  DVT Pharmacologic Prophylaxis   Medication   None                Code Status: Prior  Pizano: No urinary catheter in place      The 21st Century Cures Act makes medical notes like these available to patients in the interest of transparency. Please be advised this is a medical document. Medical documents are intended to carry relevant information, facts as evident, and the clinical opinion of the practitioner. The medical note is intended as peer to peer communication and may appear blunt or direct. It is written in medical language and may contain abbreviations or verbiage that are unfamiliar.

## 2025-01-19 NOTE — PLAN OF CARE
Pt here from: Home alone   Neuro: A&Ox4, VSS, RA, , IS. Denies cough, chest pain, and SOB.   Telemetry: Normal sinus rhythm at rest, sinus tachycardia when ambulating.   GI: Abdomen soft, passing gas and belching. Denies nausea. Last BM was 1/16/25.  : Voids freely in urinal, clear yellow.   Pt denies pain.   Up with SBA. Bed alarm on d/t pt fall earlier this morning.   Incisions: R eyebrow covered with ointment and steris. R FA covered with gauze and tape. R knee covered with a band-aid.    Diet: NPO before embolization procedure.   IVF running per order through [facility].   All appropriate safety measures in place. All questions and concerns addressed. Bed locked and in lowest position. Call light in reach.

## 2025-01-20 LAB
ERYTHROCYTE [DISTWIDTH] IN BLOOD BY AUTOMATED COUNT: 12.2 %
HCT VFR BLD AUTO: 27.7 %
HGB BLD-MCNC: 9.3 G/DL
MCH RBC QN AUTO: 33 PG (ref 26–34)
MCHC RBC AUTO-ENTMCNC: 33.6 G/DL (ref 31–37)
MCV RBC AUTO: 98.2 FL
PLATELET # BLD AUTO: 150 10(3)UL (ref 150–450)
RBC # BLD AUTO: 2.82 X10(6)UL
WBC # BLD AUTO: 11.9 X10(3) UL (ref 4–11)

## 2025-01-20 PROCEDURE — 99233 SBSQ HOSP IP/OBS HIGH 50: CPT | Performed by: INTERNAL MEDICINE

## 2025-01-20 NOTE — PLAN OF CARE
Assumed care of patient at 0730. Pt repositioned q2h. Pt has a phillips for elimination. Pt takes medications as ordered.     Pt does not complain of pain.  Pt has rhinorockets in nose with packing. Pt on clear liquid dit per ENT. Pt tolerates diet  well and eats 100% of his tray.     Call light within reach. Pt repositioned q2h.  Per MD Hu and ENT MD Valdez Austel- patient can go to the floor if stable      Problem: Patient/Family Goals  Goal: Patient/Family Long Term Goal  Description: Patient's Long Term Goal: Discharge    Interventions:  - Tolerate regular diet, resolve nose bleed, return of ADLs  - See additional Care Plan goals for specific interventions  Outcome: Progressing  Goal: Patient/Family Short Term Goal  Description: Patient's Short Term Goal: Comfort     Interventions:   - Rhino rocket x 2, hold blood thinners, NPO   - See additional Care Plan goals for specific interventions  Outcome: Progressing     Problem: CARDIOVASCULAR - ADULT  Goal: Maintains optimal cardiac output and hemodynamic stability  Description: INTERVENTIONS:  - Monitor vital signs, rhythm, and trends  - Monitor for bleeding, hypotension and signs of decreased cardiac output  - Evaluate effectiveness of vasoactive medications to optimize hemodynamic stability  - Monitor arterial and/or venous puncture sites for bleeding and/or hematoma  - Assess quality of pulses, skin color and temperature  - Assess for signs of decreased coronary artery perfusion - ex. Angina  - Evaluate fluid balance, assess for edema, trend weights  Outcome: Progressing  Goal: Absence of cardiac arrhythmias or at baseline  Description: INTERVENTIONS:  - Continuous cardiac monitoring, monitor vital signs, obtain 12 lead EKG if indicated  - Evaluate effectiveness of antiarrhythmic and heart rate control medications as ordered  - Initiate emergency measures for life threatening arrhythmias  - Monitor electrolytes and administer replacement therapy as  ordered  Outcome: Progressing     Problem: HEMATOLOGIC - ADULT  Goal: Maintains hematologic stability  Description: INTERVENTIONS  - Assess for signs and symptoms of bleeding or hemorrhage  - Monitor labs and vital signs for trends  - Administer supportive blood products/factors, fluids and medications as ordered and appropriate  - Administer supportive blood products/factors as ordered and appropriate  Outcome: Progressing  Goal: Free from bleeding injury  Description: (Example usage: patient with low platelets)  INTERVENTIONS:  - Avoid intramuscular injections, enemas and rectal medication administration  - Ensure safe mobilization of patient  - Hold pressure on venipuncture sites to achieve adequate hemostasis  - Assess for signs and symptoms of internal bleeding  - Monitor lab trends  - Patient is to report abnormal signs of bleeding to staff  - Avoid use of toothpicks and dental floss  - Use electric shaver for shaving  - Use soft bristle tooth brush  - Limit straining and forceful nose blowing  Outcome: Progressing

## 2025-01-20 NOTE — PLAN OF CARE
Received patient sitting up in the bed, eating supper, all clear liquid, swallows without difficulty. Patient has nasal packing/rhino rackets bilaterally and dry gauze under nose. Had one episode of coughing bright red blood around 5-10 ml, rinsed his mouth,with cold water,  no more bleeding noted. Once complained of generalized discomfort, medicated with tylenol with good relief. Plan of care discussed, safety/fall precautions initiated, call light within reach, side rails up x 3, bed alarm on, frequent rounding.     Problem: HEMATOLOGIC - ADULT  Goal: Maintains hematologic stability  Description: INTERVENTIONS  - Assess for signs and symptoms of bleeding or hemorrhage  - Monitor labs and vital signs for trends  - Administer supportive blood products/factors, fluids and medications as ordered and appropriate  - Administer supportive blood products/factors as ordered and appropriate  Outcome: Progressing     Problem: CARDIOVASCULAR - ADULT  Goal: Maintains optimal cardiac output and hemodynamic stability  Description: INTERVENTIONS:  - Monitor vital signs, rhythm, and trends  - Monitor for bleeding, hypotension and signs of decreased cardiac output  - Evaluate effectiveness of vasoactive medications to optimize hemodynamic stability  - Monitor arterial and/or venous puncture sites for bleeding and/or hematoma  - Assess quality of pulses, skin color and temperature  - Assess for signs of decreased coronary artery perfusion - ex. Angina  - Evaluate fluid balance, assess for edema, trend weights  Outcome: Progressing

## 2025-01-20 NOTE — PROGRESS NOTES
[facility]   part of [place] Hospitalist Progress Note     Jose Angel Kincaid Patient Status:  Observation    7/10/1940 MRN [id number]   [facility] 3NW-A Attending Edvin Hu,    Hosp Day # 1 PCP Cl De Leon MD     Chief Complaint: nose bleed  Subjective:     Denies lightheadedness or trouble breathing  Feels like bleeding has stopped but still has scant blood around nasal packing     Objective:    Review of Systems:   A comprehensive review of systems was completed; pertinent positive and negatives stated in subjective.    Vital signs:  Temp:  [97.1 °F (36.2 °C)-99.2 °F (37.3 °C)] 98.4 °F (36.9 °C)  Pulse:  [58-93] 63  Resp:  [9-24] 14  BP: (100-146)/(51-87) 136/61  SpO2:  [94 %-100 %] 98 %    Physical Exam:    General: No acute distress, right forehead laceration   Respiratory: No wheezes, no rhonchi  Cardiovascular: S1, S2, regular rate and rhythm  Abdomen: Soft, Non-tender, non-distended, positive bowel sounds  Neuro: No new focal deficits.   Extremities: No edema      Diagnostic Data:    Labs:  Recent Labs   Lab 25  0800 25  2106 25  0627 25  1320 25  0445   WBC 7.5 7.6 8.8 9.7 11.9*   HGB 14.1 11.8* 11.8* 10.6* 9.3*   MCV 93.9 95.3 96.4 98.1 98.2   .0 168.0 181.0 176.0 150.0   INR 1.11 1.15  --   --   --        Recent Labs   Lab 25  0800 25  0627 25  1735   * 110* 132*   BUN 22 18 25*   CREATSERUM 0.83 0.74 0.87   CA 10.0 9.7 8.9   ALB 4.5  --   --     143 145   K 3.6 3.6 3.7    109 111   CO2 29.0 25.0 26.0   ALKPHO 78  --   --    AST 28  --   --    ALT 21  --   --    BILT 0.8  --   --    TP 7.6  --   --        Estimated Glomerular Filtration Rate: 85.1 mL/min/1.73m2 (by CKD-EPI based on SCr of 0.87 mg/dL).    No results for input(s): \"TROP\", \"TROPHS\", \"CK\" in the last 168 hours.    Recent Labs   Lab 25  0800 25  2106   PTP 14.4 14.9*   INR 1.11 1.15                  Microbiology    No  results found for this visit on 01/18/25.      Imaging: Reviewed in Epic.    Medications:    sodium ferric gluconate  125 mg Intravenous Daily    ceFAZolin  2 g Intravenous Q8H    amLODIPine  5 mg Oral Nightly    tamsulosin  0.4 mg Oral Daily    pantoprazole  40 mg Oral QAM AC    atorvastatin  40 mg Oral Nightly    metoprolol succinate ER  25 mg Oral BID       Assessment & Plan:      #Severe and persistent epistaxis   -Diet per ENT on consult  -bilateral rhino rockets in place, initial in place for past 3 days will start TSS ppx, Cefazolin  -s/p cervicocerebral angiography and embolization 1/19  -Ambulate as able, no chemical dvt ppx, PT eval      #Acute blood loss anemia  -supplement Fe  -monitor and transfuse with goal >7    #Syncope 2/2 above with forehead laceration  -hold further IVF, PTA Bumex on hold since admission      #CAD  -hold ASA  -Statin   -Metoprolol     #Chronic HFpEF  -compensated  -recent ECHO 1/13 reviewed   -hold Bumex     #Essential HTN  -may need to hold Amlodipine      #BPH  -flomax        Edvin Hu DO    Supplementary Documentation:     Quality:  DVT Mechanical Prophylaxis:   SCDs, Early ambuation  DVT Pharmacologic Prophylaxis   Medication   None                Code Status: Full Code  Pizano: Pizano catheter in place      The 21st Century Cures Act makes medical notes like these available to patients in the interest of transparency. Please be advised this is a medical document. Medical documents are intended to carry relevant information, facts as evident, and the clinical opinion of the practitioner. The medical note is intended as peer to peer communication and may appear blunt or direct. It is written in medical language and may contain abbreviations or verbiage that are unfamiliar.

## 2025-01-21 LAB
ANION GAP SERPL CALC-SCNC: 6 MMOL/L (ref 0–18)
BASOPHILS # BLD AUTO: 0.03 X10(3) UL (ref 0–0.2)
BASOPHILS NFR BLD AUTO: 0.3 %
BUN BLD-MCNC: 18 MG/DL (ref 9–23)
CALCIUM BLD-MCNC: 9.2 MG/DL (ref 8.7–10.6)
CHLORIDE SERPL-SCNC: 106 MMOL/L (ref 98–112)
CO2 SERPL-SCNC: 30 MMOL/L (ref 21–32)
CREAT BLD-MCNC: 0.96 MG/DL
EGFRCR SERPLBLD CKD-EPI 2021: 78 ML/MIN/1.73M2 (ref 60–?)
EOSINOPHIL # BLD AUTO: 0.09 X10(3) UL (ref 0–0.7)
EOSINOPHIL NFR BLD AUTO: 0.9 %
ERYTHROCYTE [DISTWIDTH] IN BLOOD BY AUTOMATED COUNT: 12.6 %
GLUCOSE BLD-MCNC: 153 MG/DL (ref 70–99)
HCT VFR BLD AUTO: 26.3 %
HGB BLD-MCNC: 8.9 G/DL
IMM GRANULOCYTES # BLD AUTO: 0.07 X10(3) UL (ref 0–1)
IMM GRANULOCYTES NFR BLD: 0.7 %
LYMPHOCYTES # BLD AUTO: 1.7 X10(3) UL (ref 1–4)
LYMPHOCYTES NFR BLD AUTO: 16.2 %
MAGNESIUM SERPL-MCNC: 2 MG/DL (ref 1.6–2.6)
MCH RBC QN AUTO: 32.8 PG (ref 26–34)
MCHC RBC AUTO-ENTMCNC: 33.8 G/DL (ref 31–37)
MCV RBC AUTO: 97 FL
MONOCYTES # BLD AUTO: 0.88 X10(3) UL (ref 0.1–1)
MONOCYTES NFR BLD AUTO: 8.4 %
NEUTROPHILS # BLD AUTO: 7.72 X10 (3) UL (ref 1.5–7.7)
NEUTROPHILS # BLD AUTO: 7.72 X10(3) UL (ref 1.5–7.7)
NEUTROPHILS NFR BLD AUTO: 73.5 %
OSMOLALITY SERPL CALC.SUM OF ELEC: 299 MOSM/KG (ref 275–295)
PLATELET # BLD AUTO: 173 10(3)UL (ref 150–450)
POTASSIUM SERPL-SCNC: 3.3 MMOL/L (ref 3.5–5.1)
RBC # BLD AUTO: 2.71 X10(6)UL
SODIUM SERPL-SCNC: 142 MMOL/L (ref 136–145)
WBC # BLD AUTO: 10.5 X10(3) UL (ref 4–11)

## 2025-01-21 PROCEDURE — 99232 SBSQ HOSP IP/OBS MODERATE 35: CPT | Performed by: INTERNAL MEDICINE

## 2025-01-21 RX ORDER — POTASSIUM CHLORIDE 1.5 G/1.58G
40 POWDER, FOR SOLUTION ORAL ONCE
Status: COMPLETED | OUTPATIENT
Start: 2025-01-21 | End: 2025-01-21

## 2025-01-21 RX ORDER — ECHINACEA PURPUREA EXTRACT 125 MG
1 TABLET ORAL EVERY 4 HOURS
Status: DISCONTINUED | OUTPATIENT
Start: 2025-01-21 | End: 2025-01-24

## 2025-01-21 NOTE — PROGRESS NOTES
[facility]   part of [facility]     Hospitalist Progress Note     Jose Angel Kincaid Patient Status:  Observation    7/10/1940 MRN [id number]   Location [facility] 3NW-A Attending Edvin Hu,    Hosp Day # 2 PCP Cl De Leon MD     Chief Complaint: nose bleed  Subjective:     Right rhino rocket removed by ENT today, no bleeding  Pt ambulated with PT felt lightheaded     Objective:    Review of Systems:   A comprehensive review of systems was completed; pertinent positive and negatives stated in subjective.    Vital signs:  Temp:  [97.8 °F (36.6 °C)-98.4 °F (36.9 °C)] 97.8 °F (36.6 °C)  Pulse:  [55-96] 58  Resp:  [12-25] 15  BP: (115-156)/(54-90) 127/72  SpO2:  [94 %-99 %] 96 %    Physical Exam:    General: No acute distress, right forehead laceration and periorbital ecchymoses   Respiratory: No wheezes, no rhonchi  Cardiovascular: S1, S2, regular rate and rhythm  Abdomen: Soft, Non-tender, non-distended, positive bowel sounds  Neuro: No new focal deficits.   Extremities: No edema      Diagnostic Data:    Labs:  Recent Labs   Lab 25  0800 25  2106 25  0627 25  1320 25  0445 25  0525   WBC 7.5 7.6 8.8 9.7 11.9* 10.5   HGB 14.1 11.8* 11.8* 10.6* 9.3* 8.9*   MCV 93.9 95.3 96.4 98.1 98.2 97.0   .0 168.0 181.0 176.0 150.0 173.0   INR 1.11 1.15  --   --   --   --        Recent Labs   Lab 25  0800 25  0627 25  1735   * 110* 132*   BUN 22 18 25*   CREATSERUM 0.83 0.74 0.87   CA 10.0 9.7 8.9   ALB 4.5  --   --     143 145   K 3.6 3.6 3.7    109 111   CO2 29.0 25.0 26.0   ALKPHO 78  --   --    AST 28  --   --    ALT 21  --   --    BILT 0.8  --   --    TP 7.6  --   --        Estimated Glomerular Filtration Rate: 85.1 mL/min/1.73m2 (by CKD-EPI based on SCr of 0.87 mg/dL).    No results for input(s): \"TROP\", \"TROPHS\", \"CK\" in the last 168 hours.    Recent Labs   Lab 25  0800 25  2106   PTP 14.4 14.9*   INR 1.11 1.15                   Microbiology    No results found for this visit on 01/18/25.      Imaging: Reviewed in Epic.    Medications:    sodium ferric gluconate  125 mg Intravenous Daily    ceFAZolin  2 g Intravenous Q8H    amLODIPine  5 mg Oral Nightly    tamsulosin  0.4 mg Oral Daily    pantoprazole  40 mg Oral QAM AC    atorvastatin  40 mg Oral Nightly    metoprolol succinate ER  25 mg Oral BID       Assessment & Plan:      #Severe and persistent epistaxis   -Right rhino rocket removed, Left remains in place - ENT following  -TSS ppx, Cefazolin   -s/p cervicocerebral angiography and embolization 1/19  -Ambulate as able, no chemical dvt ppx, PT eval      #Acute blood loss anemia  -Ferric gluconate IV  -monitor and transfuse with Hb goal >7    #Syncope 2/2 above with forehead laceration  -hold further IVF, PTA Bumex on hold since admission      #CAD  -hold ASA  -Statin   -Metoprolol     #Chronic HFpEF  -compensated  -recent ECHO 1/13 reviewed   -hold Bumex     #Essential HTN  -hold Amlodipine      #BPH  -flomax    POC:  Advanced diet  Transfer out of ICU        Edvin Hu DO    Supplementary Documentation:     Quality:  DVT Mechanical Prophylaxis:   SCDs, Early ambuation  DVT Pharmacologic Prophylaxis   Medication   None                Code Status: Full Code  Pizano: No urinary catheter in place      The 21st Century Cures Act makes medical notes like these available to patients in the interest of transparency. Please be advised this is a medical document. Medical documents are intended to carry relevant information, facts as evident, and the clinical opinion of the practitioner. The medical note is intended as peer to peer communication and may appear blunt or direct. It is written in medical language and may contain abbreviations or verbiage that are unfamiliar.

## 2025-01-21 NOTE — PROGRESS NOTES
Jose Angel Kincaid is a 84 year old male.   Chief Complaint   Patient presents with    Nose Bleed     HPI:   84 year old male s/p bilateral internal maxillary artery IR embolization on 1/19.  Per RN and patient, patient has not had any bleeding overnight but is spitting up blood-tinged saliva.    No current outpatient medications on file.      Past Medical History:    Arteriosclerosis of thoracic aorta (HCC)    CT incidental December 2019    Benign non-nodular prostatic hyperplasia with lower urinary tract symptoms    By imaging and modest symptoms     Bilateral cataracts    Formatting of this note might be different from the original.  Overview:   MODEST   -2014/2015    Bilateral plantar fasciitis    BPH (benign prostatic hypertrophy)    CAD (coronary artery disease)    LAD   STENTED   JONAS  -  9/2013 DR ZABALA  -  45%  LEFT   MAIN ALSO   SEEN   LV  45%      Carotid artery plaque     15-49%    9/2019     30% bilateral December 2017    Cataract    COPD (chronic obstructive pulmonary disease) (HCC)    COVID-19    3 days of cough, runny nose, low saturations needing oxygen.    Elevated C-reactive protein (CRP)    Formatting of this note might be different from the original.  Overview:   September 2016 will repeat occurred during infection viral    Elevated lipoprotein(a)    Essential hypertension    Herpes    Hiatal hernia with GERD    History of migraine    EXERCISE X 4  SINCE 2012     Hyperlipidemia    Inguinal hernia    Lumbar radiculopathy    Surgery 3/2023    Mass of upper lobe of left lung    resolved    Mitral regurgitation    mild-last echo 1/2025    OA (osteoarthritis)    hips and hands    Osteopenia    T     -2.1    2014     Pneumonia due to organism    Polycystic renal disease    Prediabetes    Skin cancer    New  Episode  Left  Cheek  -   9/2018  -    SQUAMOUS   NOSE.   F/U DR DEE  2X  YEARLY  -   WITH  RECENT   AK'S    Squamous cell carcinoma    nose x 3 and hand    Vitamin D deficiency      Social  History:  Social History     Socioeconomic History    Marital status: Single    Number of children: 0    Years of education: 16 college   Occupational History    Occupation: retired   Tobacco Use    Smoking status: Never    Smokeless tobacco: Never   Vaping Use    Vaping status: Never Used   Substance and Sexual Activity    Alcohol use: Yes     Comment: occ beer    Drug use: No    Sexual activity: Never   Other Topics Concern    Caffeine Concern Yes     Comment: some    Stress Concern No    Weight Concern No    Special Diet No    Exercise Yes     Comment: vigorous    Seat Belt Yes   Social History Narrative    Single    No kids    Retired sales (chemical sales to steel industry)    No tobacco, drugs    1 wine 5-6x/wk     Social Drivers of Health     Food Insecurity: No Food Insecurity (1/18/2025)    Food Insecurity     Food Insecurity: Never true   Transportation Needs: No Transportation Needs (1/18/2025)    Transportation Needs     Lack of Transportation: No   Housing Stability: Low Risk  (1/18/2025)    Housing Stability     Housing Instability: No      Past Surgical History:   Procedure Laterality Date    Angioplasty (coronary)  09/2013 9/13 cath 40% ostial LM, 80-90% mid to distal LAD (2.5 x 26mm Resolute Integrity) EF 45%    Appendectomy  1954    Cataract      Colonoscopy  01/2004    Colonoscopy N/A 10/31/2014    Procedure: COLONOSCOPY;  Surgeon: Ken Vinson MD;  Location:  ENDOSCOPY    Hernia surgery  1997    right    Injection, anesthetic/steroid, paravertebral facet joint/nerve; lumbar/sacral, single level  04/06/2022    Parminder    Ir angiogram cerebral carotid bilateral  1/19/2025    Ir aspiration/injection  04/28/2022    Parminder    Ir head and neck embolization  1/19/2025    Lumbar spine surgery  03/08/2023        Skin surgery  01/08/2020    MOHS SCCIS Right mid posterior scalp    Skin surgery  02/11/2020    MOHS, BCC, right preauricular - Dr. ABA Harris         REVIEW OF SYSTEMS:   GENERAL  HEALTH: feels well otherwise  GENERAL : denies fever, chills, sweats, weight loss, weight gain  SKIN: denies any unusual skin lesions or rashes  RESPIRATORY: denies shortness of breath with exertion  NEURO: denies headaches    EXAM:   /74 (BP Location: Right arm)   Pulse 63   Temp 98.2 °F (36.8 °C) (Temporal)   Resp 15   Ht 5' 6\" (1.676 m)   Wt 133 lb 9.6 oz (60.6 kg)   SpO2 98%   BMI 21.56 kg/m²     System Findings Details   Constitutional  Overall appearance - Normal.   Eyes  Sclera white, pupils equal and round, EOMI   Ears  External ears normal in appearance   Nose  Bilateral Rhino Rocket's in place, right Rhino Rocket deflated and removed, no bleeding noted   Throat  Posterior pharynx clear, uvula midline, tonsils 1+   Oral cavity  Lips normal in appearance, mucous membranes clear, no masses, FOM soft, tongue without lesions   Neck  Trachea midline   Neurological  Memory - Normal. Cranial nerves - Cranial nerves II through XII grossly intact.       ASSESSMENT AND PLAN:   84 year old male with CAD, HFpEF, HTN s/p bilateral internal maxillary artery embolization for epistaxis.    -Right rhino rocket removed, left rhino rocket in place.  ENT to remove remaining rhino rocket  -Continue antistaph antibiotics  -Recommend Ashley nasal spray to bilateral nares 4 times daily (can apply saline spray directly to nasal packing)  -Remainder of care per primary    Thank you for allowing me to participate in the care of this patient.    Courtney Barillas MD, ALOK  Facial Plastic & Reconstructive Surgery, Otolaryngology  [facility]

## 2025-01-21 NOTE — PLAN OF CARE
Assumed care of Kenny at 1930. Noted w/ bilateral rhino rocket & nasal packing intact. Denies dyspnea w/ sats >92% on room air. NSR; VSS. He is alert/oriented x 4. GASTELUM , following commands without focal weakness. \"Generalized pain\" when asked . Plan of care discussed. Will continue to monitor.  0600-Over night without active nasal bleeding but occasional small bloody spit. Hgb stable this am.

## 2025-01-21 NOTE — OCCUPATIONAL THERAPY NOTE
OCCUPATIONAL THERAPY EVALUATION - INPATIENT     Room Number: 6621/6621-A  Evaluation Date: 1/21/2025  Type of Evaluation: Initial  Presenting Problem: epistaxis    Physician Order: IP Consult to Occupational Therapy  Reason for Therapy: ADL/IADL Dysfunction and Discharge Planning    OCCUPATIONAL THERAPY ASSESSMENT   Patient is currently functioning near baseline with toileting, bathing, lower body dressing, and dynamic standing balance. Prior to admission, patient's baseline is independent.  Patient is requiring CGA as a result of the following impairments: impaired standing balance. Occupational Therapy will continue to follow for duration of hospitalization.    Patient will benefit from continued skilled OT Services at discharge to promote prior level of function and safety with additional support and return home with home health OT    History Related to Current Admission: Patient is a 84 year old male admitted on 1/18/2025 with Presenting Problem: epistaxis. ER visits the last several days for epistaxis, fell in bathroom on 1/19, anemia,recurrent severe epistaxis with ongoing bleeding despite bilateral packing and cauter, 1/19  cervicocerebral angiography and embolization for epistaxis     Co-Morbidities : coronary artery disease, CAD, migraine, COPD, lumbar radiculopathy, LLE numbness x 9 years - s/p L3-S1 decompression and un-inst fusion 3/2023    EVALUATION SESSION:  Patient Start of Session: supine    FUNCTIONAL TRANSFER ASSESSMENT  Sit to Stand: Edge of Bed  Edge of Bed: Contact Guard Assist  Toilet Transfer: Stand-by Assist    BED MOBILITY  Supine to Sit : Stand-by Assist    COGNITION  Safety Judgement:  decreased awareness of need for assistance    Upper Extremity   ROM: within functional limits   Strength: within functional limits   Coordination  Gross motor:   Fine motor: intact    EDUCATION PROVIDED  Patient Education : Role of Occupational Therapy; Plan of Care; Energy Conservation  Patient's Response  to Education: Verbalized Understanding    Equipment used: rw       Therapist comments: supine to sit sba, cga to stand and to ambulate to bathroom, cueing for safe rw placement, able to stand and toilet with RW, sba, cueing for safety awareness, educated the pt about fall prevention protocol.Alarm on.    Patient End of Session: Up in chair;Needs met;Call light within reach;RN aware of session/findings;Alarm set    OCCUPATIONAL PROFILE    HOME SITUATION  Type of Home: Condo  Home Layout: Two level  Lives With: Alone    Toilet and Equipment: Comfort height toilet;Toilet riser  Shower/Tub and Equipment: Walk-in shower;Shower chair             Drives: Yes  Patient Regularly Uses: Reading glasses    Prior Level of Function: independent with ADL, IADL        PAIN ASSESSMENT  Rating: Unable to rate  Location: \"little bit here\" pointing at his forehead laceration       OBJECTIVE  Precautions: Bed/chair alarm (-160)  Fall Risk: Standard fall risk    ASSESSMENTS    AM-PAC ‘6-Clicks’ Inpatient Daily Activity Short Form  -   Putting on and taking off regular lower body clothing?: A Little  -   Bathing (including washing, rinsing, drying)?: A Little  -   Toileting, which includes using toilet, bedpan or urinal? : A Little  -   Putting on and taking off regular upper body clothing?: None  -   Taking care of personal grooming such as brushing teeth?: None  -   Eating meals?: None    AM-PAC Score:  Score: 21  Approx Degree of Impairment: 32.79%  Standardized Score (AM-PAC Scale): 44.27    ADDITIONAL TESTS     NEUROLOGICAL FINDINGS      COGNITION ASSESSMENTS     PLAN     OT Treatment Plan: Energy conservation/work simplification techniques;Balance activities;ADL training;Functional transfer training;Patient/Family education;Patient/Family training;Compensatory technique education;Equipment eval/education  Rehab Potential : Good  Frequency: 3x/week  Number of Visits to Meet Established Goals: 3    ADL Goals   Patient will  perform grooming: with supervision  Patient will perform lower body dressing:  with supervision  Patient will perform toileting: with supervision    Functional Transfer Goals  Patient will transfer to toilet:  with supervision    Additional Goals  Pt will recall at least 3 home safety recommendation ideas.      Patient Evaluation Complexity Level:   Occupational Profile/Medical History LOW - Brief history including review of medical or therapy records    Specific performance deficits impacting engagement in ADL/IADL LOW  1 - 3 performance deficits    Client Assessment/Performance Deficits MODERATE - Comorbidities and min to mod modifications of tasks    Clinical Decision Making LOW - Analysis of occupational profile, problem-focused assessments, limited treatment options    Overall Complexity LOW     OT Session Time: 20 minutes  Self-Care Home Management: 8 minutes

## 2025-01-21 NOTE — PHYSICAL THERAPY NOTE
PHYSICAL THERAPY EVALUATION - INPATIENT     Room Number: 6621/6621-A  Evaluation Date: 1/21/2025  Type of Evaluation: Initial  Physician Order: PT Eval and Treat    Presenting Problem: epistaxis  Co-Morbidities : coronary artery disease, CAD, migraine, COPD, lumbar radiculopathy, LLE numbness x 9 years - s/p L3-S1 decompression and un-inst fusion 3/2023  Reason for Therapy: Mobility Dysfunction and Discharge Planning    PHYSICAL THERAPY ASSESSMENT   Patient is a 84 year old male admitted 1/18/2025 for epistaxis.  Prior to admission, patient's baseline is indep.  Patient is currently functioning near baseline with bed mobility, transfers, and gait.  Patient is requiring supervision and use of RW  as a result of the following impairments: decreased functional strength, decreased endurance/aerobic capacity, and medical status.  Physical Therapy will continue to follow for duration of hospitalization.    Patient will benefit from continued skilled PT Services at discharge to promote prior level of function.  Anticipate patient will return home with home health PT.    PLAN DURING HOSPITALIZATION  Nursing Mobility Recommendation : 1 Assist  PT Device Recommendation: Rolling walker  PT Treatment Plan: Bed mobility;Body mechanics;Coordination;Endurance;Energy conservation;Patient education;Family education;Gait training;Neuromuscular re-educate;Range of motion;Strengthening;Stoop training;Stair training;Transfer training;Balance training  Rehab Potential : Good  Frequency (Obs): 3-5x/week     CURRENT GOALS    Goal #1 Patient is able to demonstrate supine - sit EOB @ level: supervision     Goal #2 Patient is able to demonstrate transfers EOB to/from Chair/Wheelchair at assistance level: supervision     Goal #3 Patient is able to ambulate 150 feet with assist device: walker - rolling at assistance level: supervision     Goal #4 Patient will navigate stairs with rail and SBA   Goal #5    Goal #6    Goal Comments: Goals  established on 1/21/2025      PHYSICAL THERAPY MEDICAL/SOCIAL HISTORY  History related to current admission: Patient is a 84 year old male admitted on 1/18/2025: Presented with epistaxis (had presented to the ED x3 in the last several days and x2 in the last 24hrs prior to admit), RR placed. Fall 1/19 here in the bathroom.  s/p corticocerebral angiography and embolization for epistaxis 1/19. Right RR removed 1/21    HOME SITUATION  Type of Home: Condo  Home Layout: Two level                     Lives With: Alone    Drives: Yes   Patient Regularly Uses: Reading glasses      Prior Level of [place]: indep, no additional hx of falls, use to enjoy skiing and went internationally, reports after his back surgery all sx improved    SUBJECTIVE  \" I was a little unsure about this\" - walking    OBJECTIVE  Precautions: Bed/chair alarm (-160)  Fall Risk: Standard fall risk    WEIGHT BEARING RESTRICTION     PAIN ASSESSMENT  Rating: Unable to rate  Location: HA  Management Techniques: Activity promotion;Body mechanics;Repositioning    COGNITION  Overall Cognitive Status:  WFL - within functional limits    RANGE OF MOTION AND STRENGTH ASSESSMENT  Upper extremity ROM and strength are within functional limits     Lower extremity ROM is within functional limits     Lower extremity strength is within functional limits     BALANCE  Static Sitting: Good  Dynamic Sitting: Good  Static Standing: Fair -  Dynamic Standing: Fair -    ADDITIONAL TESTS                                    ACTIVITY TOLERANCE           BP: 134/74  BP Location: Right arm  BP Method: Automatic  Patient Position: Sitting    O2 WALK       NEUROLOGICAL FINDINGS                        AM-PAC '6-Clicks' INPATIENT SHORT FORM - BASIC MOBILITY  How much difficulty does the patient currently have...  Patient Difficulty: Turning over in bed (including adjusting bedclothes, sheets and blankets)?: None   Patient Difficulty: Sitting down on and standing up from a  chair with arms (e.g., wheelchair, bedside commode, etc.): A Little   Patient Difficulty: Moving from lying on back to sitting on the side of the bed?: A Little   How much help from another person does the patient currently need...   Help from Another: Moving to and from a bed to a chair (including a wheelchair)?: A Little   Help from Another: Need to walk in hospital room?: A Little   Help from Another: Climbing 3-5 steps with a railing?: A Little     AM-PAC Score:  Raw Score: 19   Approx Degree of Impairment: 41.77%   Standardized Score (AM-PAC Scale): 45.44   CMS Modifier (G-Code): CK    FUNCTIONAL ABILITY STATUS  Gait Assessment   Functional Mobility/Gait Assessment  Gait Assistance: Contact guard assist  Distance (ft): 200  Assistive Device: Rolling walker  Pattern: Within Functional Limits    Skilled Therapy Provided     Bed Mobility:  Rolling: mod I   Supine to sit: mod I    Sit to supine: NT     Transfer Mobility:  Sit to stand: cga   Stand to sit: cga  Gait = cga    Therapist's Comments: Discussed case with RN prior to session initiation. Pt agreeable to participation in therapy. Gait belt donned for out of bed mobility. Participated in gait training with RW for inc support to address endurance impairments. pt educated on energy conservation techniques including slower gait speed, standing/seated rest breaks, pacing activities, and use of assistive device. Plan to assess gait w/o device next session and perform stair training as tolerated. Re-enforced call don't fall policy .      Exercise/Education Provided:  Energy conservation  Functional activity tolerated  Gait training  Transfer training    Patient End of Session: Up in chair;Needs met;Call light within reach;RN aware of session/findings;All patient questions and concerns addressed;Hospital anti-slip socks;Alarm set      Patient Evaluation Complexity Level:  History Moderate - 1 or 2 personal factors and/or co-morbidities   Examination of body systems  Moderate - addressing a total of 3 or more elements   Clinical Presentation  Moderate - Evolving   Clinical Decision Making Moderate Complexity       PT Session Time: 23 minutes  Gait Trainin minutes  Therapeutic Activity: = minutes  Neuromuscular Re-education:  minutes  Therapeutic Exercise:  minutes

## 2025-01-21 NOTE — PLAN OF CARE
Received patient at 0730. Pt neuro intact. Stable on RA. SBP remained 100-160. Tolerating PO intake, full liquid diet. Per ENT, no diet restrictions. +BM today. Voiding. Ambulating standby assist with walker. Nasal passage shows no sign of active bleeding. Pt denies pain. See doc flow sheet for vital signs and assessments.    ~1750 patient was in the bathroom and had an episode of SVT with HR 190s lasting ~1min. Pt was asymptomatic. Pt brought back to bed but returned to [facility] before EKG obtained. Dr. Hu notified. BMP and Mag labs ordered.

## 2025-01-21 NOTE — CM/SW NOTE
Patient worked with therapies--recommendation for home with [facility]--referral sent--pending

## 2025-01-22 LAB
ANION GAP SERPL CALC-SCNC: 6 MMOL/L (ref 0–18)
BASOPHILS # BLD AUTO: 0.04 X10(3) UL (ref 0–0.2)
BASOPHILS NFR BLD AUTO: 0.6 %
BUN BLD-MCNC: 13 MG/DL (ref 9–23)
CALCIUM BLD-MCNC: 9.2 MG/DL (ref 8.7–10.6)
CHLORIDE SERPL-SCNC: 107 MMOL/L (ref 98–112)
CO2 SERPL-SCNC: 27 MMOL/L (ref 21–32)
CREAT BLD-MCNC: 0.73 MG/DL
EGFRCR SERPLBLD CKD-EPI 2021: 90 ML/MIN/1.73M2 (ref 60–?)
EOSINOPHIL # BLD AUTO: 0.19 X10(3) UL (ref 0–0.7)
EOSINOPHIL NFR BLD AUTO: 2.7 %
ERYTHROCYTE [DISTWIDTH] IN BLOOD BY AUTOMATED COUNT: 12.3 %
GLUCOSE BLD-MCNC: 169 MG/DL (ref 70–99)
HCT VFR BLD AUTO: 29.6 %
HGB BLD-MCNC: 10.2 G/DL
IMM GRANULOCYTES # BLD AUTO: 0.03 X10(3) UL (ref 0–1)
IMM GRANULOCYTES NFR BLD: 0.4 %
LYMPHOCYTES # BLD AUTO: 1.41 X10(3) UL (ref 1–4)
LYMPHOCYTES NFR BLD AUTO: 19.7 %
MAGNESIUM SERPL-MCNC: 2.1 MG/DL (ref 1.6–2.6)
MCH RBC QN AUTO: 32.8 PG (ref 26–34)
MCHC RBC AUTO-ENTMCNC: 34.5 G/DL (ref 31–37)
MCV RBC AUTO: 95.2 FL
MONOCYTES # BLD AUTO: 0.62 X10(3) UL (ref 0.1–1)
MONOCYTES NFR BLD AUTO: 8.7 %
NEUTROPHILS # BLD AUTO: 4.85 X10 (3) UL (ref 1.5–7.7)
NEUTROPHILS # BLD AUTO: 4.85 X10(3) UL (ref 1.5–7.7)
NEUTROPHILS NFR BLD AUTO: 67.9 %
OSMOLALITY SERPL CALC.SUM OF ELEC: 294 MOSM/KG (ref 275–295)
PLATELET # BLD AUTO: 205 10(3)UL (ref 150–450)
POTASSIUM SERPL-SCNC: 3.5 MMOL/L (ref 3.5–5.1)
POTASSIUM SERPL-SCNC: 3.9 MMOL/L (ref 3.5–5.1)
RBC # BLD AUTO: 3.11 X10(6)UL
SODIUM SERPL-SCNC: 140 MMOL/L (ref 136–145)
WBC # BLD AUTO: 7.1 X10(3) UL (ref 4–11)

## 2025-01-22 PROCEDURE — 99232 SBSQ HOSP IP/OBS MODERATE 35: CPT | Performed by: HOSPITALIST

## 2025-01-22 RX ORDER — METOPROLOL SUCCINATE 25 MG/1
25 TABLET, EXTENDED RELEASE ORAL
Status: DISCONTINUED | OUTPATIENT
Start: 2025-01-23 | End: 2025-01-24

## 2025-01-22 NOTE — PROGRESS NOTES
[facility]     Jose Angel Kincaid Patient Status:  Observation    7/10/1940 MRN [id number]   Location [facility] 3NW-A Attending Edvin Hu,    Hosp Day # 3 PCP Cl De Leon MD     Chief Complaint: nose bleed  Subjective:     Pt feels ok.  No complaints.  BP a little low.      Objective:    Review of Systems:   A comprehensive review of systems was completed; pertinent positive and negatives stated in subjective.    Vital signs:  Temp:  [98 °F (36.7 °C)-98.7 °F (37.1 °C)] 98.1 °F (36.7 °C)  Pulse:  [] 87  Resp:  [7-21] 20  BP: ()/(51-73) 120/68  SpO2:  [94 %-98 %] 97 %    Physical Exam:    General: No acute distress  Respiratory: No wheezes, no rhonchi  Cardiovascular: S1, S2, regular rate and rhythm  Abdomen: Soft, Non-tender, non-distended, positive bowel sounds  Neuro: No new focal deficits.   Extremities: No edema      Diagnostic Data:    Labs:  Recent Labs   Lab 25  0800 25  2106 25  0627 25  1320 25  0445 25  0525 25  1016   WBC 7.5 7.6 8.8 9.7 11.9* 10.5 7.1   HGB 14.1 11.8* 11.8* 10.6* 9.3* 8.9* 10.2*   MCV 93.9 95.3 96.4 98.1 98.2 97.0 95.2   .0 168.0 181.0 176.0 150.0 173.0 205.0   INR 1.11 1.15  --   --   --   --   --        Recent Labs   Lab 25  0800 25  0627 25  1735 25  1832 25  0427 25  1016   *   < > 132* 153*  --  169*   BUN 22   < > 25* 18  --  13   CREATSERUM 0.83   < > 0.87 0.96  --  0.73   CA 10.0   < > 8.9 9.2  --  9.2   ALB 4.5  --   --   --   --   --       < > 145 142  --  140   K 3.6   < > 3.7 3.3* 3.9 3.5      < > 111 106  --  107   CO2 29.0   < > 26.0 30.0  --  27.0   ALKPHO 78  --   --   --   --   --    AST 28  --   --   --   --   --    ALT 21  --   --   --   --   --    BILT 0.8  --   --   --   --   --    TP 7.6  --   --   --   --   --     < > = values in this interval not displayed.       Estimated  Glomerular Filtration Rate: 89.7 mL/min/1.73m2 (by CKD-EPI based on SCr of 0.73 mg/dL).    No results for input(s): \"TROP\", \"TROPHS\", \"CK\" in the last 168 hours.    Recent Labs   Lab 01/18/25  0800 01/18/25  2106   PTP 14.4 14.9*   INR 1.11 1.15                  Microbiology    No results found for this visit on 01/18/25.      Imaging: Reviewed in Epic.    Medications:    [START ON 1/23/2025] metoprolol succinate ER  25 mg Oral Daily Beta Blocker    sodium chloride  1 spray Each Nare q4h    sodium ferric gluconate  125 mg Intravenous Daily    ceFAZolin  2 g Intravenous Q8H    [Held by provider] amLODIPine  5 mg Oral Nightly    tamsulosin  0.4 mg Oral Daily    pantoprazole  40 mg Oral QAM AC    atorvastatin  40 mg Oral Nightly       Assessment & Plan:      #Severe and persistent epistaxis   -Right rhino rocket removed, Left remains in place  -TSS ppx, Cefazolin   -s/p cervicocerebral angiography and embolization 1/19  -Ambulate as able, no chemical dvt ppx, PT eval      #Acute blood loss anemia  -Ferric gluconate IV  -monitor and transfuse with Hb goal >7    #Syncope 2/2 above with forehead laceration  -hold further IVF, PTA Bumex on hold since admission      #CAD  -hold ASA  -Statin   -Metoprolol (dose adjusted and parameters added)     #Chronic HFpEF  -compensated  -recent ECHO 1/13 reviewed   -hold Bumex     #Essential HTN  -hold Amlodipine      #BPH  -flomax    Dispo:  as above.  Left nare packing in place.  BP a bit low this morning.  Monitor closely.  BB adjustd      Alfonso Ordoñez MD      Supplementary Documentation:     Quality:  DVT Mechanical Prophylaxis:   SCDs, Early ambuation  DVT Pharmacologic Prophylaxis   Medication   None                Code Status: Full Code  Piz[place]: No urinary catheter in place      The 21st Century Cures Act makes medical notes like these available to patients in the interest of transparency. Please be advised this is a medical document. Medical documents are intended to carry  relevant information, facts as evident, and the clinical opinion of the practitioner. The medical note is intended as peer to peer communication and may appear blunt or direct. It is written in medical language and may contain abbreviations or verbiage that are unfamiliar.

## 2025-01-22 NOTE — CM/SW NOTE
Met with patient to discuss discharge planning.  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  Kenny resides in a second floor condo (with no elevator) in [place] by himself.  Prior to admission, patient independent with ADL's--drove--no respiratory DME used.  Patient tries to stay active--walks the mall three times a week for 3 miles.  He prides himself on being independent--cooks, cleans, etc.  PCP is Dr Cl De Leon--per patient, he and his physician have talked about JUSTEN stay @ [facility].  Acknowledged patient working with therapies yesterday with [facility] recommendation, but will work again with therapies tomorrow.  Attempted to explain process for JUSTEN--therapies recommendation, medicare guidelines for JUSTEN stay, etc.  After back surgery--patient went to [facility].    Patient uses [facility] to fill scripts.  Patient to be re assessed by therapies tomorrow.  Kenny also shared that hiss brother from out of state expected in tomorrow as well.  CM/SW to continue to follow--have [facility] list if this the 'chosen path' or JUSTEN recommendation by therapies   01/22/25 1200   CM/SW Referral Data   Referral Source    Reason for Referral Discharge planning   Informant Patient   Medical Hx   Does patient have an established PCP? Yes   Patient Info   Patient's Current Mental Status at Time of Assessment Alert;Oriented   Patient's Home Environment Condo/Apt no elevator   Number of Levels in Home 1   Number of Stair in Home about 10-11 steps with landings to pt's 2nd floor condo   Patient lives with Alone   Patient Status Prior to Admission   Independent with ADLs and Mobility Yes   Discharge Needs   Anticipated D/C needs Home health care;To be determined

## 2025-01-22 NOTE — PLAN OF CARE
Assumed care of Kenny since 1930. No acute event overnight- remain neurologically intact. L rhino rocket  in place; no epistaxis. NSR; no recurrence of SVT.  Sas >92% on room air. Toprol XL given as scheduled. Nasal spray q 4 hours.

## 2025-01-22 NOTE — PLAN OF CARE
Assumed patient care at 0730. Patient resting in bed, alert/oriented. Neuros Q4- intact. See flowsheets for full assessment. Left rhino rocket in place. Monitor shows sinus rhythm. Tolerating regular diet. Able to void. Denies pain. Ambualting halls with standby assist and walker. Transfer orders, waiting for bed availability. Patient updated with plan of care, all questions answered.

## 2025-01-23 PROCEDURE — 99232 SBSQ HOSP IP/OBS MODERATE 35: CPT | Performed by: HOSPITALIST

## 2025-01-23 RX ORDER — ASPIRIN 81 MG/1
81 TABLET ORAL EVERY MORNING
Qty: 30 TABLET | Refills: 0 | Status: SHIPPED | OUTPATIENT
Start: 2025-01-23 | End: 2025-02-26

## 2025-01-23 NOTE — PHYSICAL THERAPY NOTE
PHYSICAL THERAPY TREATMENT NOTE - INPATIENT    Room Number: 6621/6621-A     Session: 1     Number of Visits to Meet Established Goals: 5  History related to current admission: Patient is a 84 year old male admitted on 1/18/2025: Presented with epistaxis (had presented to the ED x3 in the last several days and x2 in the last 24hrs prior to admit), RR placed. Fall 1/19 here in the bathroom.  s/p corticocerebral angiography and embolization for epistaxis 1/19. Right RR removed 1/21     HOME SITUATION  Type of Home: Condo  Home Layout: Two level                     Lives With: Alone    Drives: Yes   Patient Regularly Uses: Reading glasses       Prior Level of [place]: indep, no additional hx of falls, use to enjoy skiing and went internationally, reports after his back surgery all sx improved  Presenting Problem: epistaxis  Co-Morbidities : coronary artery disease, CAD, migraine, COPD, lumbar radiculopathy, LLE numbness x 9 years - s/p L3-S1 decompression and un-inst fusion 3/2023    PHYSICAL THERAPY ASSESSMENT   Patient demonstrates excellent progress this session, goals  all MET.      Patient has met all skilled IPPT goals at this time. Patient will be discharged from Physical Therapy services.  Please re-order if a new functional limitation presents during this admission.      Patient continues to benefit from continued skilled PT services: at discharge to promote prior level of function.  Anticipate patient will return home with home health PT.    PLAN DURING HOSPITALIZATION  Nursing Mobility Recommendation : 1 Assist  PT Device Recommendation: Rolling walker  PT Treatment Plan: Bed mobility;Body mechanics;Coordination;Endurance;Energy conservation;Patient education;Family education;Gait training;Neuromuscular re-educate;Range of motion;Strengthening;Stoop training;Stair training;Transfer training;Balance training  Frequency (Obs): 3-5x/week     CURRENT GOALS     Goal #1 Patient is able to demonstrate supine -  sit EOB @ level: supervision      Goal #2 Patient is able to demonstrate transfers EOB to/from Chair/Wheelchair at assistance level: supervision      Goal #3 Patient is able to ambulate 150 feet with assist device: walker - rolling at assistance level: supervision      Goal #4 Patient will navigate stairs with rail and SBA   Goal #5     Goal #6     Goal Comments: Goals established on 2025 all goals achieved     SUBJECTIVE  \"I'm worried about the other packing coming out today\"     OBJECTIVE  Precautions: Bed/chair alarm (-160)    WEIGHT BEARING RESTRICTION     PAIN ASSESSMENT   Ratin  Location: HA  Management Techniques: Activity promotion;Body mechanics;Repositioning    BALANCE                                                                                                                       Static Sitting: Good  Dynamic Sitting: Good           Static Standing: Fair -  Dynamic Standing: Fair -    ACTIVITY TOLERANCE           BP: 139/75  BP Location: Left arm  BP Method: Automatic  Patient Position: Sitting    O2 WALK       AM-PAC '6-Clicks' INPATIENT SHORT FORM - BASIC MOBILITY  How much difficulty does the patient currently have...  Patient Difficulty: Turning over in bed (including adjusting bedclothes, sheets and blankets)?: None   Patient Difficulty: Sitting down on and standing up from a chair with arms (e.g., wheelchair, bedside commode, etc.): None   Patient Difficulty: Moving from lying on back to sitting on the side of the bed?: None   How much help from another person does the patient currently need...   Help from Another: Moving to and from a bed to a chair (including a wheelchair)?: None   Help from Another: Need to walk in hospital room?: A Little   Help from Another: Climbing 3-5 steps with a railing?: A Little     AM-PAC Score:  Raw Score: 22   Approx Degree of Impairment: 20.91%   Standardized Score (AM-PAC Scale): 53.28   CMS Modifier (G-Code): CJ    FUNCTIONAL ABILITY  STATUS  Gait Assessment   Functional Mobility/Gait Assessment  Gait Assistance: Contact guard assist;Supervision  Distance (ft): 200  Assistive Device: Rolling walker;None  Pattern:  (slow gait speed)  Stairs: Stairs  How Many Stairs: 13  Device: 1 Rail  Assist: Supervision  Pattern: Ascend and Descend  Ascend and Descend : Step to    Skilled Therapy Provided    Bed Mobility:  Rolling: NT   Supine<>Sit: NT   Sit<>Supine: NT     Transfer Mobility:  Sit<>Stand: SBA   Stand<>Sit: SBA   Gait: SBA    Therapist's Comments: Discussed case with RN prior to session initiation. Pt agreeable to participation in therapy. Gait belt donned for out of bed mobility. Pt able to progress gait training SBA with RW progressing to CGA without device - able to perform slow/partial ROM horizontal and vertical head turns while ambulating w/o device. Educated on technique for stair training including use of rail and step to pattern for EC and safety.         Patient End of Session: In bed;With  staff;Needs met;Call light within reach;RN aware of session/findings;All patient questions and concerns addressed;[facility] anti-slip socks    PT Session Time: 24 minutes  Gait Trainin minutes  Therapeutic Activity:  minutes  Therapeutic Exercise:  minutes   Neuromuscular Re-education:  minutes

## 2025-01-23 NOTE — PLAN OF CARE
Assumed patient care at 0730. Patient resting in bed, alert/oriented. Neuros Q4- intact. Left rhino rocket removed by ENT at bedside. VSS. Tolerating diet. Denies pain. Ambulating with walker and standby assist. Per Mds, okay for patient to discharge. Plan for patient to go to [facility] tomorrow per request.

## 2025-01-23 NOTE — PLAN OF CARE
Assumed patient care at 1930. VSS. Neuro q4h, unchanged. L rhino rocket intact. Denies pain. Patient updated with plan of care.

## 2025-01-23 NOTE — PROGRESS NOTES
Jose Angel Kincaid is a 84 year old male.   Chief Complaint   Patient presents with    Nose Bleed     HPI:   84 year old male s/p bilateral internal maxillary artery IR embolization on 1/19.  Per RN and patient, patient has not had any further bleeding.    No current outpatient medications on file.      Past Medical History:    Arteriosclerosis of thoracic aorta (HCC)    CT incidental December 2019    Benign non-nodular prostatic hyperplasia with lower urinary tract symptoms    By imaging and modest symptoms     Bilateral cataracts    Formatting of this note might be different from the original.  Overview:   MODEST   -2014/2015    Bilateral plantar fasciitis    BPH (benign prostatic hypertrophy)    CAD (coronary artery disease)    LAD   STENTED   JONAS  -  9/2013 DR ZABALA  -  45%  LEFT   MAIN ALSO   SEEN   LV  45%      Carotid artery plaque     15-49%    9/2019     30% bilateral December 2017    Cataract    COPD (chronic obstructive pulmonary disease) (HCC)    COVID-19    3 days of cough, runny nose, low saturations needing oxygen.    Elevated C-reactive protein (CRP)    Formatting of this note might be different from the original.  Overview:   September 2016 will repeat occurred during infection viral    Elevated lipoprotein(a)    Essential hypertension    Herpes    Hiatal hernia with GERD    History of migraine    EXERCISE X 4  SINCE 2012     Hyperlipidemia    Inguinal hernia    Lumbar radiculopathy    Surgery 3/2023    Mass of upper lobe of left lung    resolved    Mitral regurgitation    mild-last echo 1/2025    OA (osteoarthritis)    hips and hands    Osteopenia    T     -2.1    2014     Pneumonia due to organism    Polycystic renal disease    Prediabetes    Skin cancer    New  Episode  Left  Cheek  -   9/2018  -    SQUAMOUS   NOSE.   F/U DR DEE  2X  YEARLY  -   WITH  RECENT   AK'S    Squamous cell carcinoma    nose x 3 and hand    Vitamin D deficiency      Social History:  Social History     Socioeconomic  History    Marital status: Single    Number of children: 0    Years of education: 16 college   Occupational History    Occupation: retired   Tobacco Use    Smoking status: Never    Smokeless tobacco: Never   Vaping Use    Vaping status: Never Used   Substance and Sexual Activity    Alcohol use: Yes     Comment: occ beer    Drug use: No    Sexual activity: Never   Other Topics Concern    Caffeine Concern Yes     Comment: some    Stress Concern No    Weight Concern No    Special Diet No    Exercise Yes     Comment: vigorous    Seat Belt Yes   Social History Narrative    Single    No kids    Retired sales (chemical sales to steel industry)    No tobacco, drugs    1 wine 5-6x/wk     Social Drivers of Health     Food Insecurity: No Food Insecurity (1/18/2025)    Food Insecurity     Food Insecurity: Never true   Transportation Needs: No Transportation Needs (1/18/2025)    Transportation Needs     Lack of Transportation: No   Housing Stability: Low Risk  (1/18/2025)    Housing Stability     Housing Instability: No      Past Surgical History:   Procedure Laterality Date    Angioplasty (coronary)  09/2013 9/13 cath 40% ostial LM, 80-90% mid to distal LAD (2.5 x 26mm Resolute Integrity) EF 45%    Appendectomy  1954    Cataract      Colonoscopy  01/2004    Colonoscopy N/A 10/31/2014    Procedure: COLONOSCOPY;  Surgeon: Ken Vinson MD;  Location:  ENDOSCOPY    Hernia surgery  1997    right    Injection, anesthetic/steroid, paravertebral facet joint/nerve; lumbar/sacral, single level  04/06/2022    Parminder    Ir angiogram cerebral carotid bilateral  1/19/2025    Ir aspiration/injection  04/28/2022    Parminder    Ir head and neck embolization  1/19/2025    Lumbar spine surgery  03/08/2023        Skin surgery  01/08/2020    MOHS SCCIS Right mid posterior scalp    Skin surgery  02/11/2020    MOHS, BCC, right preauricular - Dr. ABA Harris         REVIEW OF SYSTEMS:   GENERAL HEALTH: feels well otherwise  GENERAL : denies  fever, chills, sweats, weight loss, weight gain  SKIN: denies any unusual skin lesions or rashes  RESPIRATORY: denies shortness of breath with exertion  NEURO: denies headaches    EXAM:   /75 (BP Location: Left arm)   Pulse 80   Temp 97.8 °F (36.6 °C) (Temporal)   Resp 23   Ht 5' 6\" (1.676 m)   Wt 133 lb 9.6 oz (60.6 kg)   SpO2 97%   BMI 21.56 kg/m²       Constitutional   Overall appearance - Normal.   Eyes   Sclera white, pupils equal and round, EOMI   Ears   External ears normal in appearance   Nose   Left Rhino Rocket in place without bleeding, deflated and removed, no bleeding noted   Throat   Posterior pharynx clear, uvula midline, tonsils 1+   Oral cavity   Lips normal in appearance, mucous membranes clear, no masses, FOM soft, tongue without lesions   Neck   Trachea midline   Neurological   Memory - Normal. Cranial nerves - Cranial nerves II through XII grossly intact.       ASSESSMENT AND PLAN:   84 year old male with CAD, HFpEF, HTN s/p bilateral internal maxillary artery embolization for epistaxis.     -Remaining left rhinorocket removed  -Continue saline nasal spray to bilateral nares 4-6 times daily   -Okay to discontinue antistaph antibiotics and discharge from ENT standpoint. Hold 81 mg ASA for 7 days if okay from medical standpoint  -Follow up as outpatient in 2-3 weeks  -Remainder of care per primary    Thank you for allowing me to participate in the care of this patient.     Courtney Barillas MD, ALOK  Facial Plastic & Reconstructive Surgery, Otolaryngology  [facility]

## 2025-01-23 NOTE — CM/SW NOTE
After patient spoke with Dr De Leon, to explore the possibility of private payment @ [facility] or [facility]--referrals to both facilities sent in AIDIN--pending

## 2025-01-23 NOTE — SPIRITUAL CARE NOTE
Spiritual Care Visit Note    Patient Name: Jose Angel Kincaid Date of Spiritual Care Visit: 25   : 7/10/1940 Primary Dx: Epistaxis       Referred By:      Spiritual Care Taxonomy:    Intended Effects: Build relationship of care and support    Methods: Offer spiritual/Bahai support;Offer emotional support;Encourage sharing of feelings;Encourage self reflection    Interventions: Acknowledge current situation;Active listening;Explain  role;Identify supportive relationship(s);Prayer for healing    Visit Type/Summary:     - Spiritual Care: When I entered the room, Kenny was sitting up in chair watching tv. Offered empathic listening and emotional support. Provided support for Patient's spiritual/Bahai requests. Offered prayer. Patient expressed appreciation for  visit. Provided information regarding how to contact Spiritual Care and left a Spiritual Care information card.   remains available for follow up.    Spiritual Care support can be requested via an Epic consult. For urgent/immediate needs, please contact the On Call  at: Edward: ext [phone]    Rev Genevieve Andrews MA

## 2025-01-23 NOTE — CM/SW NOTE
Patient worked with therapies again today, with same recommendation of home with [facility].  Patient adamant about going to rehab ([facility]) as discussed with his PCP Dr De Leon.  Referral sent to [facility] in [facility] to confirm with facility if patient meets clinical criteria for acceptance or not.  If not meeting criteria, requested private pay estimated cost to share with patient.  Still pending [facility] referral list of agencies if this discharge direction--await response from [facility]

## 2025-01-23 NOTE — CM/SW NOTE
[facility] able to accept tomorrow as private pay or respite stay.  Cost is $450.oo per day--2 weeks of payment expected @ time of admission.  [facility] unable to accept due to \"no bed available.\"  Shared information with patient--happy to hear facility able to accept--reserved in AIDIN   bp up ? D/t no meds: repeat bp check after taking meds

## 2025-01-23 NOTE — CM/SW NOTE
Received message and confirmed with [facility]--unable to accept patient 'incorrect level of care.'  Patient ha a 'lack of skilled need'  patient is currently walking 200 feet--rehab needs to show patient's progress with therapies, otherwise it impacts their star rating.  As for private pay option--no current beds available--?uncertain if any would open up until Sunday or Monday.  To update patient and provide current [facility] list    Spoke with patient--updated him of above--accepted [facility] list but now wishes to speak directly with Dr De Leon as he is thinking of [facility] but first want to speak with Dr De Leon first.

## 2025-01-24 ENCOUNTER — EXTERNAL FACILITY (OUTPATIENT)
Dept: FAMILY MEDICINE CLINIC | Facility: CLINIC | Age: 85
End: 2025-01-24

## 2025-01-24 VITALS
OXYGEN SATURATION: 99 % | RESPIRATION RATE: 26 BRPM | HEART RATE: 101 BPM | HEIGHT: 66 IN | WEIGHT: 133.63 LBS | SYSTOLIC BLOOD PRESSURE: 148 MMHG | BODY MASS INDEX: 21.47 KG/M2 | TEMPERATURE: 98 F | DIASTOLIC BLOOD PRESSURE: 91 MMHG

## 2025-01-24 DIAGNOSIS — R04.0 RECURRENT EPISTAXIS: ICD-10-CM

## 2025-01-24 DIAGNOSIS — I10 BENIGN ESSENTIAL HTN: ICD-10-CM

## 2025-01-24 DIAGNOSIS — I50.9 CHRONIC CONGESTIVE HEART FAILURE, UNSPECIFIED HEART FAILURE TYPE (HCC): ICD-10-CM

## 2025-01-24 DIAGNOSIS — N40.0 BENIGN PROSTATIC HYPERPLASIA, UNSPECIFIED WHETHER LOWER URINARY TRACT SYMPTOMS PRESENT: ICD-10-CM

## 2025-01-24 DIAGNOSIS — R53.1 GENERALIZED WEAKNESS: Primary | ICD-10-CM

## 2025-01-24 DIAGNOSIS — D62 ACUTE BLOOD LOSS ANEMIA: ICD-10-CM

## 2025-01-24 PROCEDURE — 99306 1ST NF CARE HIGH MDM 50: CPT | Performed by: FAMILY MEDICINE

## 2025-01-24 PROCEDURE — 99239 HOSP IP/OBS DSCHRG MGMT >30: CPT | Performed by: HOSPITALIST

## 2025-01-24 PROCEDURE — G0317 PROLNG NSG FAC E/M EA ADDL 15 MIN: HCPCS | Performed by: FAMILY MEDICINE

## 2025-01-24 NOTE — PLAN OF CARE
Assumed care of pt at 0730. Pt Aox4, neurologically intact. VSS on RA. R facial, arm and knee lacerations noted. Pt eating, drinking, voiding in urinal, ambulating with walker. Discharge orders received. Pt verbalized understanding of discharge instructions. Pt left with all belongings and no acute distress.

## 2025-01-24 NOTE — CONGREGATE LIVING REVIEW
Congregate Living Authorization    [facility] ([facility] has reviewed this case and the committee DOES NOT RECOMMEND discharge to a skilled nursing facility under skilled care.    The CLRC recommends:  Home with home health and any other appropriate caregiver assistance or medical equipment as needed vs respite in SNF.     Does not appear to have skilled services for [facility], but additional home support appears to be needed.    For questions regarding CLRC approval process, please contact the CM assigned to the case.  For questions regarding RN discharge workflow, please contact the unit Clinical Leader.

## 2025-01-24 NOTE — CM/SW NOTE
Spoke with Margot in admissions @ [facility]--they will attempt to skill patient and will need PASSR.  Updated patient--anticipate 5-7 days, aware not admitting as respite stay.  Requested yomi gonzales--aware cost/payment not expected 2 time of service.  Patient assigned to room 114 @ [facility]--nurse to call report to [phone]--PCS form completed.

## 2025-01-24 NOTE — PROGRESS NOTES
[facility]   part of [facility]     Hospitalist Progress Note     Jose Angel Kincaid Patient Status:  Observation    7/10/1940 MRN [id number]   Location [facility] 3NW-A Attending Edvin Hu,    Hosp Day # 5 PCP Cl De Leon MD     Chief Complaint: nose bleed  Subjective:     Pt feels well    Objective:    Review of Systems:   A comprehensive review of systems was completed; pertinent positive and negatives stated in subjective.    Vital signs:  Temp:  [97.3 °F (36.3 °C)-98.2 °F (36.8 °C)] 97.9 °F (36.6 °C)  Pulse:  [64-86] 86  Resp:  [15-21] 21  BP: (116-157)/(61-89) 157/89  SpO2:  [94 %-98 %] 97 %    Physical Exam:    General: No acute distress  Respiratory: No wheezes, no rhonchi  Cardiovascular: S1, S2, regular rate and rhythm  Abdomen: Soft, Non-tender, non-distended, positive bowel sounds  Neuro: No new focal deficits.   Extremities: No edema      Diagnostic Data:    Labs:  Recent Labs   Lab 25  0800 25  2106 25  0627 25  1320 25  0445 25  0525 25  1016   WBC 7.5 7.6 8.8 9.7 11.9* 10.5 7.1   HGB 14.1 11.8* 11.8* 10.6* 9.3* 8.9* 10.2*   MCV 93.9 95.3 96.4 98.1 98.2 97.0 95.2   .0 168.0 181.0 176.0 150.0 173.0 205.0   INR 1.11 1.15  --   --   --   --   --        Recent Labs   Lab 25  0800 25  0627 25  1735 25  1832 25  0427 25  1016   *   < > 132* 153*  --  169*   BUN 22   < > 25* 18  --  13   CREATSERUM 0.83   < > 0.87 0.96  --  0.73   CA 10.0   < > 8.9 9.2  --  9.2   ALB 4.5  --   --   --   --   --       < > 145 142  --  140   K 3.6   < > 3.7 3.3* 3.9 3.5      < > 111 106  --  107   CO2 29.0   < > 26.0 30.0  --  27.0   ALKPHO 78  --   --   --   --   --    AST 28  --   --   --   --   --    ALT 21  --   --   --   --   --    BILT 0.8  --   --   --   --   --    TP 7.6  --   --   --   --   --     < > = values in this interval not displayed.       Estimated Glomerular Filtration Rate: 89.7  mL/min/1.73m2 (by CKD-EPI based on SCr of 0.73 mg/dL).    No results for input(s): \"TROP\", \"TROPHS\", \"CK\" in the last 168 hours.    Recent Labs   Lab 01/18/25  0800 01/18/25  2106   PTP 14.4 14.9*   INR 1.11 1.15                  Microbiology    No results found for this visit on 01/18/25.      Imaging: Reviewed in Epic.    Medications:    metoprolol succinate ER  25 mg Oral Daily Beta Blocker    sodium chloride  1 spray Each Nare q4h    ceFAZolin  2 g Intravenous Q8H    [Held by provider] amLODIPine  5 mg Oral Nightly    tamsulosin  0.4 mg Oral Daily    pantoprazole  40 mg Oral QAM AC    atorvastatin  40 mg Oral Nightly       Assessment & Plan:      #Severe and persistent epistaxis   -Right rhino rocket removed, Left also removed  -TSS ppx, Cefazolin   -s/p cervicocerebral angiography and embolization 1/19  -Ambulate as able     #Acute blood loss anemia  -Ferric gluconate IV  Hgb stable    #Syncope 2/2 above with forehead laceration  -hold further IVF, PTA Bumex on hold since admission      #CAD  -hold ASA  -Statin   -Metoprolol (dose adjusted and parameters added)     #Chronic HFpEF  -compensated  -recent ECHO 1/13 reviewed   -hold Bumex     #Essential HTN  -hold Amlodipine      #BPH  -flomax    Dispo:  as above.  Feels well.        Alfonso Ordoñez MD      Supplementary Documentation:     Quality:  DVT Mechanical Prophylaxis:   SCDs, Early ambuation  DVT Pharmacologic Prophylaxis   Medication   None         DVT Pharmacologic prophylaxis: Aspirin 81 mg      Code Status: Full Code  Pizano: No urinary catheter in place      The 21st Century Cures Act makes medical notes like these available to patients in the interest of transparency. Please be advised this is a medical document. Medical documents are intended to carry relevant information, facts as evident, and the clinical opinion of the practitioner. The medical note is intended as peer to peer communication and may appear blunt or direct. It is written in medical  language and may contain abbreviations or verbiage that are unfamiliar.

## 2025-01-25 NOTE — DISCHARGE SUMMARY
[facility]     Jose Angel Kincaid Patient Status:  Inpatient    7/10/1940 MRN [id number]   Location [facility] 6NE-A Attending No att. providers found   Hosp Day # 5 PCP Cl De Leon MD     Date of Admission:  2025  Date of Discharge:   2025    Discharge Disposition: Inpt Physical Rehab Facility or Physical Rehab Unit    Discharge Diagnosis:    #Severe and persistent epistaxis    #Acute blood loss anemia   #Syncope 2/2 above with forehead laceration   #CAD   #Chronic HFpEF   #Essential HTN   #BPH      History of Present Illness:    Jose Angel Kincaid is a 84 year old male with past medical history of coronary artery disease presented to the emergency department with epistaxis.     Patient reports he got up to urinate Wednesday evening and his left nare suddenly started bleeding, he states it was dripping like a faucet, he presented to the emergency department had a Rhino Rocket placed, he went home and started bleeding again he presented again to the emergency department yesterday evening a larger Rhino Rocket was placed he went home again and then again this morning it started bleeding so he presented to the emergency department  He has no chest pain, no trouble breathing       Brief Synopsis:    The patient was admitted due to epistaxis.  He had bilateral nasal packing placed and was on prophylactic antibiotics.  He had cervicocerebral angiography and embolization on .  He steadily improved afterwards and his nasal packing was able to removed.  He was started on IV iron for acute blood loss anemia.  He had a labile blood pressure that this eventually resolved.  He was event stable for discharge with plans to hold his aspirin yesterday until 2025.    All diagnosis' and recommendations discussed with patient and/or family in detail.      Lace+ Score: 82  59-90 High Risk  29-58 Medium Risk  0-28   Low Risk       TCM Follow-Up Recommendation:  LACE > 58: High Risk of  readmission after discharge from the hospital.    Procedures during hospitalization:   cervicocerebral angiography and embolization 1/19     Consultants:  ENT    Discharge Medication List:     Discharge Medications        CONTINUE taking these medications        Instructions Prescription details   amLODIPine 5 MG Tabs  Commonly known as: Norvasc      Take 1 tablet (5 mg total) by mouth at bedtime.   Refills: 0     ascorbic acid 1000 MG Tabs  Commonly known as: VITAMIN C      Take 0.5 tablets (500 mg total) by mouth 2 (two) times daily.   Refills: 0     aspirin 81 MG Tbec      Take 1 tablet (81 mg total) by mouth every morning.   Quantity: 30 tablet  Refills: 0     atorvastatin 40 MG Tabs  Commonly known as: Lipitor      Take 1 tablet (40 mg total) by mouth nightly.   Quantity: 90 tablet  Refills: 3     bumetanide 1 MG Tabs  Commonly known as: Bumex      One daily am   Quantity: 90 tablet  Refills: 3     CALTRATE 600+D OR      Take 1 tablet by mouth every other day.   Refills: 0     CoQ10 100 MG Caps      Take 1 capsule by mouth at bedtime.   Refills: 0     ergocalciferol 1.25 MG (20121 UT) Caps  Commonly known as: Vitamin D2      Take 1 capsule (50,000 Units total) by mouth every 14 (fourteen) days.   Quantity: 6 capsule  Refills: 3     fluticasone propionate 50 MCG/ACT Aepb  Commonly known as: Flovent Diskus      Inhale 1 puff into the lungs as needed.   Quantity: 1 each  Refills: 3     GLUCOSAMINE CHONDR 1500 COMPLX OR      Take 1 tablet by mouth 2 (two) times daily.   Refills: 0     JUICE PLUS FIBRE OR      Take 2 tablets by mouth in the morning, at noon, and at bedtime.   Refills: 0     magnesium 250 MG Tabs      Take 1 tablet (250 mg total) by mouth every other day. Magnesium oxide 250mg every other day.   Quantity: 30 tablet  Refills: 0     metoprolol succinate ER 25 MG Tb24  Commonly known as: Toprol XL      Take 1 tablet (25 mg total) by mouth in the morning and 1 tablet (25 mg total) before bedtime.    Quantity: 180 tablet  Refills: 3     omega-3 fatty acids 1000 MG Caps  Commonly known as: Fish Oil      Take 1,000 mg by mouth daily.   Refills: 0     Omeprazole 40 MG Cpdr      Take 1 capsule (40 mg total) by mouth daily.   Quantity: 90 capsule  Refills: 3     Prevagen 10 MG Caps  Generic drug: Apoaequorin      Take 1 tablet by mouth daily.   Refills: 0     Selenium 100 MCG Tabs      Take 1 tablet (100 mcg total) by mouth every evening.   Quantity: 60 tablet  Refills: 0     tamsulosin 0.4 MG Caps  Commonly known as: Flomax      TAKE ONE CAPSULE BY MOUTH ONE TIME DAILY   Quantity: 90 capsule  Refills: 3     Zinc 50 MG Tabs      Take 1 tablet by mouth one time.   Refills: 0               Where to Get Your Medications        These medications were sent to [id number] - [place] - [address] [phone], [phone]  [facility] [place] [address]      Phone: [phone]   aspirin 81 MG Tbec         [place]    Follow-up appointment:   No follow-up provider specified.    Vital signs:  Temp:  [97.8 °F (36.6 °C)] 97.8 °F (36.6 °C)  Pulse:  [101] 101  Resp:  [26] 26  BP: (148)/(91) 148/91  SpO2:  [99 %] 99 %    Physical Exam:    General: No acute distress   Lungs: clear to auscultation  Cardiovascular: S1, S2  Abdomen: Soft      -----------------------------------------------------------------------------------------------  PATIENT DISCHARGE INSTRUCTIONS: See electronic chart    Alfonso Ordoñez MD    Total minutes spent on discharge plannin      The  Century Cures Act makes medical notes like these available to patients in the interest of transparency. Please be advised this is a medical document. Medical documents are intended to carry relevant information, facts as evident, and the clinical opinion of the practitioner. The medical note is intended as peer to peer communication and may appear blunt or direct. It is written in medical language and may contain abbreviations or verbiage that  are unfamiliar.

## 2025-01-28 ENCOUNTER — INITIAL APN SNF VISIT (OUTPATIENT)
Dept: INTERNAL MEDICINE CLINIC | Age: 85
End: 2025-01-28

## 2025-01-28 DIAGNOSIS — N40.0 BENIGN PROSTATIC HYPERPLASIA, UNSPECIFIED WHETHER LOWER URINARY TRACT SYMPTOMS PRESENT: ICD-10-CM

## 2025-01-28 DIAGNOSIS — D62 ABLA (ACUTE BLOOD LOSS ANEMIA): ICD-10-CM

## 2025-01-28 DIAGNOSIS — J45.909 UNCOMPLICATED ASTHMA, UNSPECIFIED ASTHMA SEVERITY, UNSPECIFIED WHETHER PERSISTENT (HCC): ICD-10-CM

## 2025-01-28 DIAGNOSIS — G47.9 SLEEP DISTURBANCE: ICD-10-CM

## 2025-01-28 DIAGNOSIS — T67.1XXD HEAT SYNCOPE, SUBSEQUENT ENCOUNTER: ICD-10-CM

## 2025-01-28 DIAGNOSIS — Z87.898 H/O EPISTAXIS: Primary | ICD-10-CM

## 2025-01-28 DIAGNOSIS — I10 HYPERTENSION, UNSPECIFIED TYPE: ICD-10-CM

## 2025-01-28 DIAGNOSIS — I25.10 CORONARY ARTERY DISEASE, UNSPECIFIED VESSEL OR LESION TYPE, UNSPECIFIED WHETHER ANGINA PRESENT, UNSPECIFIED WHETHER NATIVE OR TRANSPLANTED HEART: ICD-10-CM

## 2025-01-28 DIAGNOSIS — I50.9 CHRONIC HEART FAILURE, UNSPECIFIED HEART FAILURE TYPE (HCC): ICD-10-CM

## 2025-01-28 PROCEDURE — 99310 SBSQ NF CARE HIGH MDM 45: CPT | Performed by: NURSE PRACTITIONER

## 2025-01-29 VITALS
RESPIRATION RATE: 20 BRPM | OXYGEN SATURATION: 96 % | TEMPERATURE: 97 F | DIASTOLIC BLOOD PRESSURE: 74 MMHG | HEART RATE: 82 BPM | BODY MASS INDEX: 22 KG/M2 | WEIGHT: 137.38 LBS | SYSTOLIC BLOOD PRESSURE: 123 MMHG

## 2025-01-29 RX ORDER — ACETAMINOPHEN 325 MG/1
650 TABLET ORAL EVERY 6 HOURS PRN
COMMUNITY

## 2025-01-29 RX ORDER — ECHINACEA PURPUREA EXTRACT 125 MG
1 TABLET ORAL EVERY 4 HOURS PRN
COMMUNITY

## 2025-01-29 RX ORDER — POLYETHYLENE GLYCOL 3350 17 G/17G
17 POWDER, FOR SOLUTION ORAL DAILY PRN
COMMUNITY

## 2025-01-29 NOTE — PROGRESS NOTES
Jose Angel Kincaid Author: Yony Montero MD     7/10/1940 MRN [id number]   Last Hospital  Admission 25      Last Hospital Discharge 25 PCP Cl De Leon MD   Hospital of Discharge  [facility]        CC --admitted to [facility] from [facility] facility rehab, severe persistent epistaxis syncope    H.P.I Jose Angel Kincaid is a 84 year old male with past medical history significant for coronary artery disease hypertension hyperlipidemia, had spontaneous bleeding from the left nostril.  He was seen in the emergency room where a Rhino Rocket was placed and he was sent home but started bleeding again and brought back to the emergency room.  He was admitted due to recurrent epistaxis and acute blood loss anemia, bilateral nasal packing was placed and patient was placed on prophylactic antibiotics, cervicocerebral angiography was performed and embolization was done on .  Normal bleeding after the packing was removed.  Patient was started on IV iron his hospital course was complicated by fluctuating blood pressures.  Aspirin on hold until   Discharge diagnosis  #Severe and persistent epistaxis    #Acute blood loss anemia   #Syncope 2/2 above with forehead laceration   #CAD   #Chronic HFpEF   #Essential HTN   #BPH    Patient seen in his room today  He is doing well denies any complaints, he is glad that the bleeding has stopped  He feels weak and tired, otherwise feeling well  He wants to feel better and stronger before he goes home  Patient is wondering about his hemoglobin drop and his fluctuating blood pressures      Past Medical History:    Arteriosclerosis of thoracic aorta    CT incidental 2019    Benign non-nodular prostatic hyperplasia with lower urinary tract symptoms    By imaging and modest symptoms     Bilateral cataracts    Formatting of this note might be different from the original.  Overview:   MODEST   -    Bilateral plantar fasciitis    BPH (benign  prostatic hypertrophy)    CAD (coronary artery disease)    LAD   STENTED   OJNAS  -  9/2013 DR ZABALA  -  45%  LEFT   MAIN ALSO   SEEN   LV  45%      Carotid artery plaque     15-49%    9/2019     30% bilateral December 2017    Cataract    COPD (chronic obstructive pulmonary disease) (HCC)    COVID-19    3 days of cough, runny nose, low saturations needing oxygen.    Elevated C-reactive protein (CRP)    Formatting of this note might be different from the original.  Overview:   September 2016 will repeat occurred during infection viral    Elevated lipoprotein(a)    Essential hypertension    Herpes    Hiatal hernia with GERD    History of migraine    EXERCISE X 4  SINCE 2012     Hyperlipidemia    Inguinal hernia    Lumbar radiculopathy    Surgery 3/2023    Mass of upper lobe of left lung    resolved    Mitral regurgitation    mild-last echo 1/2025    OA (osteoarthritis)    hips and hands    Osteopenia    T     -2.1    2014     Pneumonia due to organism    Polycystic renal disease    Prediabetes    Skin cancer    New  Episode  Left  Cheek  -   9/2018  -    SQUAMOUS   NOSE.   F/U DR DEE  2X  YEARLY  -   WITH  RECENT   AK'S    Squamous cell carcinoma    nose x 3 and hand    Vitamin D deficiency     Past Surgical History:   Procedure Laterality Date    Angioplasty (coronary)  09/2013 9/13 cath 40% ostial LM, 80-90% mid to distal LAD (2.5 x 26mm Resolute Integrity) EF 45%    Appendectomy  1954    Cataract      Colonoscopy  01/2004    Colonoscopy N/A 10/31/2014    Procedure: COLONOSCOPY;  Surgeon: Ken Vinson MD;  Location:  ENDOSCOPY    Hernia surgery  1997    right    Injection, anesthetic/steroid, paravertebral facet joint/nerve; lumbar/sacral, single level  04/06/2022    Parminder    Ir angiogram cerebral carotid bilateral  1/19/2025    Ir aspiration/injection  04/28/2022    Parminder    Ir head and neck embolization  1/19/2025    Lumbar spine surgery  03/08/2023        [facility]  [facility]  [place] mid posterior [place]    Skin surgery  02/11/2020    MOHS, BCC, right preauricular - Dr. ABA Harris     Family History   Problem Relation Age of Onset    Pacemaker Mother     Hypertension Mother      Social History     Socioeconomic History    Marital status: Single    Number of children: 0    Years of education: 16 college   Occupational History    Occupation: retired   Tobacco Use    Smoking status: Never    Smokeless tobacco: Never   Vaping Use    Vaping status: Never Used   Substance and Sexual Activity    Alcohol use: Yes     Comment: occ beer    Drug use: No    Sexual activity: Never   Other Topics Concern    Caffeine Concern Yes     Comment: some    Stress Concern No    Weight Concern No    Special Diet No    Exercise Yes     Comment: vigorous    Seat Belt Yes   Social History Narrative    Single    No kids    Retired sales (chemical sales to steel industry)    No tobacco, drugs    1 wine 5-6x/wk     Social Drivers of Health     Food Insecurity: No Food Insecurity (1/18/2025)    Food Insecurity     Food Insecurity: Never true   Transportation Needs: No Transportation Needs (1/18/2025)    Transportation Needs     Lack of Transportation: No   Housing Stability: Low Risk  (1/18/2025)    Housing Stability     Housing Instability: No       ALLERGIES:  Allergies[1]    CODE STATUS:  Full Code    CURRENT MEDICATIONS   Current Outpatient Medications   Medication Sig Dispense Refill    aspirin 81 MG Oral Tab EC Take 1 tablet (81 mg total) by mouth every morning. 30 tablet 0    bumetanide (BUMEX) 1 MG Oral Tab One daily am 90 tablet 3    metoprolol succinate ER 25 MG Oral Tablet 24 Hr Take 1 tablet (25 mg total) by mouth in the morning and 1 tablet (25 mg total) before bedtime. 180 tablet 3    tamsulosin 0.4 MG Oral Cap TAKE ONE CAPSULE BY MOUTH ONE TIME DAILY 90 capsule 3    Omeprazole 40 MG Oral Capsule Delayed Release Take 1 capsule (40 mg total) by mouth daily. 90 capsule 3    ergocalciferol 1.25 MG (85049 UT)  Oral Cap Take 1 capsule (50,000 Units total) by mouth every 14 (fourteen) days. 6 capsule 3    atorvastatin 40 MG Oral Tab Take 1 tablet (40 mg total) by mouth nightly. 90 tablet 3    Apoaequorin (PREVAGEN) 10 MG Oral Cap Take 1 tablet by mouth daily.      amLODIPine 5 MG Oral Tab Take 1 tablet (5 mg total) by mouth at bedtime.      fluticasone propionate 50 MCG/ACT Inhalation Aerosol Powder, Breath Activated Inhale 1 puff into the lungs as needed. 1 each 3    ascorbic acid 1000 MG Oral Tab Take 0.5 tablets (500 mg total) by mouth 2 (two) times daily.      Selenium 100 MCG Oral Tab Take 1 tablet (100 mcg total) by mouth every evening. 60 tablet 0    magnesium 250 MG Oral Tab Take 1 tablet (250 mg total) by mouth every other day. Magnesium oxide 250mg every other day. 30 tablet 0    omega-3 fatty acids 1000 MG Oral Cap Take 1,000 mg by mouth daily.      Nutritional Supplements (JUICE PLUS FIBRE OR) Take 2 tablets by mouth in the morning, at noon, and at bedtime.      Zinc 50 MG Oral Tab Take 1 tablet by mouth one time.      Coenzyme Q10 (COQ10) 100 MG Oral Cap Take 1 capsule by mouth at bedtime.      Glucosamine-Chondroit-Vit C-Mn (GLUCOSAMINE CHONDR 1500 COMPLX OR) Take 1 tablet by mouth 2 (two) times daily.      Calcium Carbonate-Vitamin D (CALTRATE 600+D OR) Take 1 tablet by mouth every other day.         REVIEW OF SYSTEMS:  Review of Systems:   Constitutional: No fevers, chills, fatigue or night sweats.  ENT: No mouth pain, neck pain, running nose, headaches or swollen glands.  Skin: No rashes, pruritus or skin changes,  Respiratory: Denies cough, wheezing or shortness of breath.  CV: Denies chest pain, palpitations, orthopnea, PND or dizziness.  Musculoskeletal: No joint pain, stiffness or swelling.  GI: No nausea, vomiting or diarrhea. No blood in stools.  Neurologic: No seizures, tremors, weakness or numbness    VITALS: Pulse 98/min respiration 18/min temperature 98.7 blood pressure is 146/90    PHYSICAL  EXAM:  GENERAL: well developed, well nourished, in no apparent distress  SKIN: no rashes, no suspicious lesions  Wound; forehead laceration wound in Steri-Strips  HEENT:, normocephalic, ears and throat are clear  Nose,--evidence of epistaxis from the left nostril  EYES: PERRLA, EOMI, conjunctiva are clear  NECK: supple, no adenopathy, no bruits  CHEST: no chest tenderness  LUNGS: clear to auscultation  CARDIO: RRR without murmur  GI: good BS's, no masses, HSM or tenderness  : deferred  RECTAL: deferred  MUSCULOSKELETAL: back is not tender, FROM of the back  EXTREMITIES: no cyanosis, clubbing or edema  NEURO: Oriented times three, cranial nerves are intact, motor and sensory are grossly intact      DIAGNOSTICS REVIEWED AT THIS VISIT:  Lab Results   Component Value Date     (H) 01/22/2025    BUN 13 01/22/2025    BUNCREA 19.2 04/12/2021    CREATSERUM 0.73 01/22/2025    ANIONGAP 6 01/22/2025    GFR 81 01/13/2017    GFRNAA 85 04/12/2021    GFRAA 99 04/12/2021    CA 9.2 01/22/2025    OSMOCALC 294 01/22/2025    ALKPHO 78 01/18/2025    AST 28 01/18/2025    ALT 21 01/18/2025    BILT 0.8 01/18/2025    TP 7.6 01/18/2025    ALB 4.5 01/18/2025    GLOBULIN 3.1 01/18/2025     01/22/2025    K 3.5 01/22/2025     01/22/2025    CO2 27.0 01/22/2025       Lab Results   Component Value Date    WBC 7.1 01/22/2025    RBC 3.11 (L) 01/22/2025    HGB 10.2 (L) 01/22/2025    HCT 29.6 (L) 01/22/2025    .0 01/22/2025    MPV 10.7 (H) 04/26/2012    MCV 95.2 01/22/2025    MCH 32.8 01/22/2025    MCHC 34.5 01/22/2025    RDW 12.3 01/22/2025    NEPRELIM 4.85 01/22/2025    NEPERCENT 67.9 01/22/2025    LYPERCENT 19.7 01/22/2025    MOPERCENT 8.7 01/22/2025    EOPERCENT 2.7 01/22/2025    BAPERCENT 0.6 01/22/2025    NE 4.85 01/22/2025    LYMABS 1.41 01/22/2025    MOABSO 0.62 01/22/2025    EOABSO 0.19 01/22/2025    BAABSO 0.04 01/22/2025     ASSESSMENT AND PLAN:      Diagnoses and all orders for this visit:    Generalized  weakness  - PT to work on ambulation, gait, balance, strength, endurance, transfers, safety  - OT to work on fine motor skills, ADLs, hygiene, toileting, transfers, safety  Recurrent epistaxis  S/p cervical cerebral angiography and embolization on 1/19  Aspirin on hold until 1/31  Acute blood loss anemia  Last hemoglobin is 10.3  Monitor H&H  Chronic congestive heart failure, unspecified heart failure type (HCC)  Benign essential HTN  Daily weights and blood pressures every shift  Continue Bumex 1 mg p.o. daily  Amlodipine 5 mg p.o. daily  Benign prostatic hyperplasia, unspecified whether lower urinary tract symptoms present  On Flomax    - 24h nursing for medication administration, bowel/bladder care, pain/sleep assessment  - Physician supervision for multiple medical comorbidities, fall risk, DVT risk, infection risk, pain management  - Psych for adjustment to disability and cognitive deficits  - Social work/: for discharge planning needs and access to community resources as needed     -Hospital medications reviewed reconciled and restarted   Check the labs in the morning, CBC CMP TSH, vitamin D, UA    Time spent at appointment today is 95 minutes including preparing to see patient, reviewing test results, performing medically appropriate examination and evaluation and coordinating care, counseling and educating patient/family, ordering medications and testing, and documenting clinical information in EMR.       Yony Montero MD   [facility]  [address], [facility]. [address] [place] [id number]    Electronically signed      This dictation was performed with a verbal recognition program (DRAGON) and it was checked for errors. It is possible that there are still dictated errors within this office note. If so, please bring any errors to my attention for an addendum. All efforts were made to ensure that this office note is accurate         [1]   Allergies  Allergen Reactions    Codeine OTHER (SEE COMMENTS)      Throat  tightness    Hctz [Hydrochlorothiazide] UNKNOWN    Nsaids OTHER (SEE COMMENTS)     GI    Zofran [Ondansetron] OTHER (SEE COMMENTS)     Constipation     Pepcid [Famotidine] OTHER (SEE COMMENTS)     neck and shoulder pains

## 2025-01-30 NOTE — PROGRESS NOTES
Skilled Nursing Facility, Subacute Rehab  [facility]    Jose Angel Moulton Kincaid Author: MYRON Carver     7/10/1940 MRN [id number]   Last Hospital  Admission 25      Last Hospital Discharge 25 PCP Cl De Leon MD     Hospital Discharge Diagnoses:  -Severe and Persistent Epistaxis  -ABLA  -Syncope  -CAD  -Chronic Heart Failure  -HTN  -BpH  -Sleep Disturbance  -Asthma    HPI:  Jose Angel Kincaid  : 7/10/1940, Age 84 year old  male patient with PMH significant for CAD, HTN, and HLD had spontaneous bleeding from the left nostril. He was seen in the emergency room where a Rhino Rocket was placed and he was sent home but started bleeding again and brought back to the emergency room. He was admitted due to recurrent epistaxis and acute blood loss anemia, bilateral nasal packing was placed and patient was placed on prophylactic antibiotics, cervicocerebral angiography was performed and embolization was done on . Normal bleeding after the packing was removed. Patient was started on IV iron his hospital course was complicated by fluctuating blood pressures. Aspirin on hold until .  The patient was discharged in stable condition to [facility] for rehabilitation and medical management.    Today:  Seen in room doing well.  He does admit to being weak from the nose bleed.  Has a laceration with SS applied at top of head.  No c/o pain, just weakness.  Demonstrated use of IS.  Does have c/o of not being to sleep.  Ordered Melatonin as listed below.      Therapy update:25  Bed mobility--CGA  Transfers--CGA  Ambulation--75 ft RW, [facility]  UE ADLs--MI  LE ADLs--CGA  Toileting--Supervision  Toilet Transfer--CGA  Bathing--Set Up    Goal:  Home alone    Chief Complaint   Patient presents with    Follow - Up     Aftercare for Severe and Persisten Epistaxis, ABLA, Syncope, CAD, Chronic Heart Failure, HTN and BPH        ALLERGIES    He is allergic to codeine, hctz [hydrochlorothiazide], nsaids, zofran  [ondansetron], and pepcid [famotidine].      CURRENT MEDS:    Current Outpatient Medications on File Prior to Visit   Medication Sig    Melatonin 3 MG Oral Cap Take 1 capsule (3 mg total) by mouth nightly as needed.    acetaminophen 325 MG Oral Tab Take 2 tablets (650 mg total) by mouth every 6 (six) hours as needed for Pain.    sodium chloride 0.65 % Nasal Solution 1 spray by Nasal route every 4 (four) hours as needed for congestion.    aspirin 81 MG Oral Tab EC Take 1 tablet (81 mg total) by mouth every morning.    bumetanide (BUMEX) 1 MG Oral Tab One daily am    metoprolol succinate ER 25 MG Oral Tablet 24 Hr Take 1 tablet (25 mg total) by mouth in the morning and 1 tablet (25 mg total) before bedtime.    tamsulosin 0.4 MG Oral Cap TAKE ONE CAPSULE BY MOUTH ONE TIME DAILY    Omeprazole 40 MG Oral Capsule Delayed Release Take 1 capsule (40 mg total) by mouth daily.    ergocalciferol 1.25 MG (89492 UT) Oral Cap Take 1 capsule (50,000 Units total) by mouth every 14 (fourteen) days.    atorvastatin 40 MG Oral Tab Take 1 tablet (40 mg total) by mouth nightly.    Apoaequorin (PREVAGEN) 10 MG Oral Cap Take 1 tablet by mouth daily.    amLODIPine 5 MG Oral Tab Take 1 tablet (5 mg total) by mouth at bedtime.    fluticasone propionate 50 MCG/ACT Inhalation Aerosol Powder, Breath Activated Inhale 1 puff into the lungs as needed.    ascorbic acid 1000 MG Oral Tab Take 0.5 tablets (500 mg total) by mouth 2 (two) times daily.    Selenium 100 MCG Oral Tab Take 1 tablet (100 mcg total) by mouth every evening.    magnesium 250 MG Oral Tab Take 1 tablet (250 mg total) by mouth every other day. Magnesium oxide 250mg every other day.    omega-3 fatty acids 1000 MG Oral Cap Take 1,000 mg by mouth daily.    Nutritional Supplements (JUICE PLUS FIBRE OR) Take 2 tablets by mouth in the morning, at noon, and at bedtime. (Patient not taking: Reported on 1/29/2025)    Zinc 50 MG Oral Tab Take 1 tablet by mouth one time.    Coenzyme Q10  (COQ10) 100 MG Oral Cap Take 1 capsule by mouth at bedtime.    Glucosamine-Chondroit-Vit C-Mn (GLUCOSAMINE CHONDR 1500 COMPLX OR) Take 1 tablet by mouth 2 (two) times daily.    Calcium Carbonate-Vitamin D (CALTRATE 600+D OR) Take 1 tablet by mouth every other day.     No current facility-administered medications on file prior to visit.         HISTORY:    He  has a past medical history of Arteriosclerosis of thoracic aorta (01/21/2020), Benign non-nodular prostatic hyperplasia with lower urinary tract symptoms (09/15/2016), Bilateral cataracts (09/14/2015), Bilateral plantar fasciitis (09/23/2019), BPH (benign prostatic hypertrophy), CAD (coronary artery disease) (09/24/2013), Carotid artery plaque (09/14/2015), Cataract, COPD (chronic obstructive pulmonary disease) ([facility]), COVID-19 (11/25/2022), Elevated C-reactive protein (CRP) (09/15/2016), Elevated lipoprotein(a) (09/23/2019), Essential hypertension, Herpes, Hiatal hernia with GERD, History of migraine (08/08/2014), Hyperlipidemia, Inguinal hernia, Lumbar radiculopathy (04/06/2022), Mass of upper lobe of left lung (12/04/2019), Mitral regurgitation, OA (osteoarthritis), Osteopenia (09/14/2015), Pneumonia due to organism, Polycystic renal disease, Prediabetes, Skin cancer (09/07/2012), Squamous cell carcinoma, and Vitamin D deficiency.    He  has a past surgical history that includes colonoscopy (01/2004); appendectomy (1954); hernia surgery (1997); angioplasty (coronary) (09/2013); colonoscopy (N/A, 10/31/2014); skin surgery (01/08/2020); skin surgery (02/11/2020); injection, anesthetic/steroid, paravertebral facet joint/nerve; lumbar/sacral, single level (04/06/2022); ir aspiration/injection (04/28/2022); cataract; lumbar spine surgery (03/08/2023); ir angiogram cerebral carotid bilateral (1/19/2025); and ir head and neck embolization (1/19/2025).      CODE STATUS VERIFIED: Full Code    SUBJECTIVE:    REVIEW OF SYSTEMS:  GENERAL HEALTH:+Feels Weak  SKIN: denies  any unusual skin lesions or rashes  WOUNDS: Right side of forehead with SS in place   EYES:no visual complaints or deficits  HENT: denies nasal congestion, sinus pain or sore throat; and hearing loss negative  RESPIRATORY: denies shortness of breath, wheezing or cough   CARDIOVASCULAR:denies chest pain, no palpitations , denies syncope, denies orthopnea, denies cough  GI: denies nausea, vomiting, constipation, diarrhea; no rectal bleeding; no heartburn  :no dysuria, urgency or frequency; no epididymal or testicular pain; no penile discharge; no hernias; no nocturia: no hematuria  MUSCULOSKELETAL:no joint complaints upper or lower extremities  NEURO:no sensory or motor complaint, denies seizures, denies vertigo, denies tinnitus, and denies tremors  PSYCHE: no symptoms of depression or anxiety  HEMATOLOGY:denies hx anemia, denies bruising, denies excessive bleeding  ENDOCRINE: denies excessive thirst or urination; denies unexpected wt gain or wt loss  ALLERGY/IMM.: denies food or seasonal allergies      OBJECTIVE:  VITALS:  /74   Pulse 82   Temp 97.3 °F (36.3 °C)   Resp 20   Wt 137 lb 6.4 oz (62.3 kg)   SpO2 96%   BMI 22.18 kg/m²     PHYSICAL EXAM:  GENERAL HEALTH: well developed, well nourished, in no apparent distress  LINES, TUBES, DRAINS:  none  SKIN: pale, warm, dry  WOUND: +Right side of forehead with SS in place   EYES: PERRLA, conjunctiva normal; no drainage from eyes  HENT: normocephalic; normal nose, no nasal drainage, mucous membranes pink, moist  NECK: full range of motion observed  RESPIRATORY:clear to percussion and auscultation, No wheezing/cough/accessory muscle use; on room air  CARDIOVASCULAR: S1, S2 normal, RRR; no S3, no S4; , no click, no murmur  ABDOMEN: normal active BS+, soft, non-distended; no apparent masses; observed, non-tender, no guarding during physical exam  : Deferred  LYMPHATIC: no lymphedema  MUSCULOSKELETAL: no acute synovitis upper or lower extremity.  Weakness R/T  recent hospitalization/diagnoses/sequelae; will undergo therapies to rehab and improve strength, endurance and independence w/ ADLs as able  EXTREMITIES/VASCULAR: no cyanosis, clubbing or edema, radial pulses 2+, and dorsalis pedal pulses 2+  NEUROLOGIC: intact; no sensorimotor deficit, reflexes normal, cranial nerves intact II-XII, follows commands  PSYCHIATRIC: alert and oriented x 3; affect appropriate    DIAGNOSTICS REVIEWED AT THIS VISIT:  Vital signs reviewed in [facility] EMR.  [facility] medical records, notes, lab and imaging results reviewed. And diagnostics available in rehab records/[facility].  Medication reconciliation completed.      Dated:  1/27/25  CBC W/DIFF  WBC 5.7 10'3/uL 3.5-10.5 Final  RBC 2.84 10'6/uL (Based on  documented  legal sex) 4.30-  5.80  L Final  HGB 9.1 g/dL (Based on  documented  legal sex) 13.0-  17.5  L Final  HCT 28.8 % (Based on  documented  legal sex) 38.0-  50.0  L Final  .4 fL 80.0-99.0 H Final  MCH 32.0 pg 27.0-34.0 Final  MCHC 31.6 g/dL 32.0-35.5 L Final  RDW 13.9 % 11.0-15.0 Final   10'3/uL 150-400 Final  MPV 10.0 fL 8.8-12.1 Final  Neutrophils 57.4 % 34.0-73.0 Final  Lymphocytes 25.1 % 15.0-50.0 Final  Monocytes 12.2 % 1.0-15.0 Final  Eosinophils 4.2 % 0.0-8.0 Final  Basophils 0.4 % 0.0-2.0 Final  Immature Granulocytes 0.7 % No defined  reference range  Final  Absolute Neutrophils 3.3 10'3/uL 1.5-8.0 Final  Absolute Lymphocytes 1.4 10'3/uL 1.0-4.0 Final  Absolute Monocytes 0.7 10'3/uL 0.2-1.0 Final  Absolute Eosinophils 0.2 10'3/uL 0.0-0.6 Final  Absolute Basophils 0.0 10'3/uL 0.0-0.3 Final  Absolute Immature Granulocytes 0.0 10'3/uL 0.00-0.10 Final    CMP(COMPREHENSIVE METABOLIC PANEL)  Sodium 141 mmol/L 133-146 Final  Potassium 4.2 mmol/L 3.5-5.1 Final  Chloride 106 mmol/L  Final  Carbon Dioxide 31 mmol/L 21-31 Final  Anion Gap 4 mmol/L 4-13 Final  Blood Urea Nitrogen 18 mg/dL 7-25 Final  Creatinine 0.68 mg/dL 0.60-1.30 Final  eGFRcr (CKD-EPI  2021) >90 mL/min/1.73  m2  >=60 Final  Calcium 8.5 mg/dL 8.3-10.5 Final  Glucose 100 mg/dL  Final  Protein, Total 5.1 g/dL 6.4-8.3 L Final  Albumin 2.9 g/dL 3.5-5.0 L Final  ALT 9 units/L 11-51 L Final  Alkaline Phosphatase 75 units/L  Final  AST 21 units/L 13-39 Final  Bilirubin, Total 0.4 mg/dL 0.2-1.2 Final    Magnesium 1.9 mg/dL 1.7-2.8 Final    Vitamin D, 25-Hydroxy, Total 52.5 ng/mL 30.0-100.0 Final    SEE PLAN BELOW  Physical Deconditioning/Impaired mobility and ADLs/At risk for falling  -Fall Precautions  -PT/OT/ST to evaluate and treat  --Tylenol 650 mg q6h prn for fever/pain, if given for fever, notify MD/NP  -[facility] team to establish care plan meeting with patient and POA/family as appropriate  -ELOS TBD  -Anticipate DC on or before TBD; SW to assist patient/family w/ DC planning  -DC Plan:  Goal Home Alone    Severe and Persistent Epistaxis  -Monitor for s/s of bleeding    ABLA  -Hgb:  9.1  -Monitor labs    Syncope  -No complaints on this visit    CAD/Chronic Heart Failure/HTN  -Vitals q shift  -Daily weights, notify MD/NP for overnight wt gain of >2lb or >5lb in one week  -Metoprolol Succinate ER 25 mg bid, hold for sbp <100 or hr<60  -Atorvastatin 40 mg at bedtime  -ASA 81 mg every day-->to start on 2/1/25  -Amlodipine 5 mg at bedtime  -Bumex 1 mg qd    BpH  -Tamsulosin 0.4 mg qd    Sleep Disturbance  -Melatonin 3 mg at bedtime prn    DVT Prophylaxis   -ASA to start on 2/1/25  -Encourage early mobilization and participation in PT/OT as able    GI Prophylaxis  -Omeprazole 40 mg qd    Bowel Management Regimen/Constipation   -Add Miralax 17 g every day prn     Supplements   -Vitamin C 500 mg bid  -Caltrate 600+ D every day  -Coq10 100 mg at bedtime  -Ergocalciferol 41104 units q weekly  -Magnesium 250 mg every other day  -Magnesium Oxide 250 mg every other day  -Omega 3 1000 mg every day  -Prevagen 10 mg every day  -Selenium 100 mcq every day  -Zinc 50 mg qd    Asthma  -Flovent 1 p  prn    LABS  -CBC/CMP weekly while in [facility]    Follow Up Appointments  -Dr. Cl De Leon, PCP within 1-2 weeks following [facility] discharge.     *Follow-Up with specialists as recommended.  No future appointments.    *Greater than 65 minutes spent w/ patient and family, reviewing medical records, labs, completing medication reconciliation and entering orders to establish plan of care in [facility].    Matteo Lopez, MYRON  01/28/25   8:26 PM

## 2025-02-04 ENCOUNTER — SNF DISCHARGE (OUTPATIENT)
Dept: INTERNAL MEDICINE CLINIC | Age: 85
End: 2025-02-04

## 2025-02-04 VITALS
WEIGHT: 138 LBS | SYSTOLIC BLOOD PRESSURE: 122 MMHG | OXYGEN SATURATION: 97 % | RESPIRATION RATE: 18 BRPM | BODY MASS INDEX: 22 KG/M2 | TEMPERATURE: 98 F | DIASTOLIC BLOOD PRESSURE: 60 MMHG | HEART RATE: 78 BPM

## 2025-02-04 DIAGNOSIS — D62 ABLA (ACUTE BLOOD LOSS ANEMIA): ICD-10-CM

## 2025-02-04 DIAGNOSIS — Z87.898 H/O EPISTAXIS: Primary | ICD-10-CM

## 2025-02-04 DIAGNOSIS — I25.10 CORONARY ARTERY DISEASE, UNSPECIFIED VESSEL OR LESION TYPE, UNSPECIFIED WHETHER ANGINA PRESENT, UNSPECIFIED WHETHER NATIVE OR TRANSPLANTED HEART: ICD-10-CM

## 2025-02-04 DIAGNOSIS — I50.9 CHRONIC HEART FAILURE, UNSPECIFIED HEART FAILURE TYPE (HCC): ICD-10-CM

## 2025-02-04 DIAGNOSIS — T67.1XXD HEAT SYNCOPE, SUBSEQUENT ENCOUNTER: ICD-10-CM

## 2025-02-04 PROCEDURE — 99316 NF DSCHRG MGMT 30 MIN+: CPT | Performed by: NURSE PRACTITIONER

## 2025-02-05 NOTE — PROGRESS NOTES
Jose Angel Kincaid, 7/10/1940, 84 year old, male is being discharged from Facility: [facility]      DISCHARGE SUMMARY    Date of Admission:25    Date of Anticipated Discharge: 25                                Admitting Diagnoses: Severe and persistent epistaxis    Subjective: Jose Angel Kincaid  : 7/10/1940, Age 84 year old  male patient with PMH significant for CAD, HTN, and HLD had spontaneous bleeding from the left nostril. He was seen in the emergency room where a Rhino Rocket was placed and he was sent home but started bleeding again and brought back to the emergency room. He was admitted due to recurrent epistaxis and acute blood loss anemia, bilateral nasal packing was placed and patient was placed on prophylactic antibiotics, cervicocerebral angiography was performed and embolization was done on . Normal bleeding after the packing was removed. Patient was started on IV iron his hospital course was complicated by fluctuating blood pressures. Aspirin on hold until .  The patient was discharged in stable condition to [facility] for rehabilitation and medical management.     Vital signs:  /60   Pulse 78   Temp 97.8 °F (36.6 °C)   Resp 18   Wt 138 lb (62.6 kg)   SpO2 97%   BMI 22.27 kg/m²     ROS and Physical Exam:    REVIEW OF SYSTEMS:  GENERAL HEALTH:+Feels Weak  SKIN: denies any unusual skin lesions or rashes  WOUNDS: Right side of forehead with SS in place   EYES:no visual complaints or deficits  HENT: denies nasal congestion, sinus pain or sore throat; and hearing loss negative  RESPIRATORY: denies shortness of breath, wheezing or cough   CARDIOVASCULAR:denies chest pain, no palpitations , denies syncope, denies orthopnea, denies cough  GI: denies nausea, vomiting, constipation, diarrhea; no rectal bleeding; no heartburn  :no dysuria, urgency or frequency; no epididymal or testicular pain; no penile discharge; no hernias; no nocturia: no hematuria  MUSCULOSKELETAL:no joint  complaints upper or lower extremities  NEURO:no sensory or motor complaint, denies seizures, denies vertigo, denies tinnitus, and denies tremors  PSYCHE: no symptoms of depression or anxiety  HEMATOLOGY:denies hx anemia, denies bruising, denies excessive bleeding  ENDOCRINE: denies excessive thirst or urination; denies unexpected wt gain or wt loss  ALLERGY/IMM.: denies food or seasonal allergies     REVIEW OF SYSTEMS:  GENERAL HEALTH:+Feels Weak  SKIN: denies any unusual skin lesions or rashes  WOUNDS: Right side of forehead with SS in place   EYES:no visual complaints or deficits  HENT: denies nasal congestion, sinus pain or sore throat; and hearing loss negative  RESPIRATORY: denies shortness of breath, wheezing or cough   CARDIOVASCULAR:denies chest pain, no palpitations , denies syncope, denies orthopnea, denies cough  GI: denies nausea, vomiting, constipation, diarrhea; no rectal bleeding; no heartburn  :no dysuria, urgency or frequency; no epididymal or testicular pain; no penile discharge; no hernias; no nocturia: no hematuria  MUSCULOSKELETAL:no joint complaints upper or lower extremities  NEURO:no sensory or motor complaint, denies seizures, denies vertigo, denies tinnitus, and denies tremors  PSYCHE: no symptoms of depression or anxiety  HEMATOLOGY:denies hx anemia, denies bruising, denies excessive bleeding  ENDOCRINE: denies excessive thirst or urination; denies unexpected wt gain or wt loss  ALLERGY/IMM.: denies food or seasonal allergies     Therapy Updates: 2/3/25  Ambulation:  100 ft with no device; 200 ft RW with supervision  ADLs/transfers: MI; LB: cga  DC Plan: Home Alone    Skilled Nursing Facility Course:    Progressing well with PT/OT.  Medically cleared for anticipated discharge  Home with home care services RN/PT/OT/ST/Bath Aide  Equipment per PT recommendations: RW    Most recent lab results: dated:  2/4/25    CBC W/DIFF  WBC 5.5 10'3/uL 3.5-10.5 Final  RBC 3.06 10'6/uL (Based  on  documented  legal sex) 4.30-  5.80  L Final  HGB 9.9 g/dL (Based on  documented  legal sex) 13.0-  17.5  L Final  HCT 31.1 % (Based on  documented  legal sex) 38.0-  50.0  L Final  .6 fL 80.0-99.0 H Final  MCH 32.4 pg 27.0-34.0 Final  MCHC 31.8 g/dL 32.0-35.5 L Final  RDW 15.1 % 11.0-15.0 H Final   10'3/uL 150-400 Final  MPV 9.7 fL 8.8-12.1 Final  Neutrophils 55.8 % 34.0-73.0 Final  Lymphocytes 27.6 % 15.0-50.0 Final  Monocytes 11.2 % 1.0-15.0 Final  Eosinophils 4.3 % 0.0-8.0 Final  Basophils 0.7 % 0.0-2.0 Final  Immature Granulocytes 0.4 % No defined  reference range  Final  Absolute Neutrophils 3.1 10'3/uL 1.5-8.0 Final  Absolute Lymphocytes 1.5 10'3/uL 1.0-4.0 Final  Absolute Monocytes 0.6 10'3/uL 0.2-1.0 Final  Absolute Eosinophils 0.2 10'3/uL 0.0-0.6 Final  Absolute Basophils 0.0 10'3/uL 0.0-0.3 Final  Absolute Immature Granulocytes 0.0 10'3/uL 0.00-0.10 Final    CMP(COMPREHENSIVE METABOLIC PANEL)  Sodium 141 mmol/L 133-146 Final  Potassium 4.3 mmol/L 3.5-5.1 Final  Chloride 106 mmol/L  Final  Carbon Dioxide 33 mmol/L 21-31 H Final  Anion Gap 2 mmol/L 4-13 L Final  Blood Urea Nitrogen 14 mg/dL 7-25 Final  Creatinine 0.81 mg/dL 0.60-1.30 Final  eGFRcr (CKD-EPI 2021) 87 mL/min/1.73  m2  >=60 Final  Calcium 8.9 mg/dL 8.3-10.5 Final  Glucose 96 mg/dL  Final  Protein, Total 5.5 g/dL 6.4-8.3 L Final  Albumin 3.1 g/dL 3.5-5.0 L Final  ALT 12 units/L 11-51 Final  Alkaline Phosphatase 79 units/L  Final  AST 17 units/L 13-39 Final  Bilirubin, Total 0.5 mg/dL 0.2-1.2 Final    Discharge Diagnoses w/ current management:  Physical Deconditioning/Impaired mobility and ADLs/At risk for falling  -PT/OT/ST to evaluate and treat, per home health  -Tylenol 650 mg q6h prn for fever/pain, if given for fever, notify MD/NP  -[facility] team to establish care plan meeting with patient and POA/family as appropriate  -Anticipate DC on or before 2/7/25; SW to assist patient/family w/ DC planning  -DC Plan:   Goal Home Alone     Severe and Persistent Epistaxis  -Monitor for s/s of bleeding     ABLA-improving  -Hgb:  9.1-->9.9  -Monitor labs     Syncope  -No complaints on this visit     CAD/Chronic Heart Failure/HTN  -Vitals q shift  -Daily weights, notify MD/NP for overnight wt gain of >2lb or >5lb in one week  -Metoprolol Succinate ER 25 mg bid, hold for sbp <100 or hr<60  -Atorvastatin 40 mg at bedtime  -ASA 81 mg qd  -Amlodipine 5 mg at bedtime  -Bumex 1 mg qd     BpH  -Tamsulosin 0.4 mg qd     Sleep Disturbance  -Melatonin 3 mg at bedtime prn     DVT Prophylaxis   -ASA 81 mg qd  -Encourage early mobilization and participation in PT/OT as able     GI Prophylaxis  -Omeprazole 40 mg qd     Bowel Management Regimen/Constipation   -Miralax 17 g every day prn     Supplements   -Vitamin C 500 mg bid  -Caltrate 600+ D every day  -Coq10 100 mg at bedtime  -Ergocalciferol 26554 units q weekly  -Magnesium 250 mg every other day  -Magnesium Oxide 250 mg every other day  -Omega 3 1000 mg every day  -Prevagen 10 mg every day  -Selenium 100 mcq every day  -Zinc 50 mg qd     Asthma  -Flovent 1 p prn     LABS  -Per PCP     Follow Up Appointments  -Dr. Cl De Leon, PCP within 1-2 weeks following [facility] discharge.      Medication Reconciliation Completed:  Yes    *Greater than 30 minutes spent in coordination of care in preparation for discharge.    Matteo Lopez, APRN  2/4/2025  6:41 PM

## 2025-02-10 RX ORDER — POTASSIUM CHLORIDE 750 MG/1
10 TABLET, EXTENDED RELEASE ORAL DAILY
Qty: 90 TABLET | Refills: 3 | Status: SHIPPED | OUTPATIENT
Start: 2025-02-10

## 2025-02-10 NOTE — TELEPHONE ENCOUNTER
Patient called and stated that his prescription for Potassium Chloride (KLOR-CON 10) 10 MEQ Oral Tab CR needs to be changed from a 90 day supply to 36. [id number] - [place], [place] - [address] [phone], [phone]

## 2025-02-10 NOTE — TELEPHONE ENCOUNTER
Last office visit: 1/15/2025    Return to Clinic: PRN    Future Appointments   Date Time Provider Department Center   2/13/2025 11:00 AM Courtney Barillas MD [id number]     Last Potassium: 3.5 on 1/22/2025    Previously directed to stop on 2/1/2024 recheck in 1 month no recheck until 9/2024 results was 3.8 on 9/5/2024 and 3.6 on 1/18/2025.    Provider please advise on refill

## 2025-02-11 RX ORDER — ATORVASTATIN CALCIUM 40 MG/1
40 TABLET, FILM COATED ORAL NIGHTLY
Qty: 90 TABLET | Refills: 2 | Status: SHIPPED | OUTPATIENT
Start: 2025-02-11

## 2025-02-11 RX ORDER — AMLODIPINE BESYLATE 5 MG/1
5 TABLET ORAL DAILY
Qty: 90 TABLET | Refills: 2 | Status: SHIPPED | OUTPATIENT
Start: 2025-02-11

## 2025-02-11 NOTE — TELEPHONE ENCOUNTER
Requested Prescriptions     Pending Prescriptions Disp Refills    AMLODIPINE 5 MG Oral Tab [Pharmacy Med Name: Amlodipine Besylate 5 Mg Tab Unic] 90 tablet 0     Sig: Take 1 tablet (5 mg total) by mouth daily.    ATORVASTATIN 40 MG Oral Tab [Pharmacy Med Name: Atorvastatin Calcium 40 Mg Tab Nort] 90 tablet 0     Sig: Take 1 tablet (40 mg total) by mouth nightly.       Last refill: Amlodipine 01/22/2024 #90 R-3 - d/c'd by PCP 01/29/2024  Subsequent OV notes indicate patient is taking medication  Atorvastatin 40 mg 01/05/2024 #90 R-3  Last office visit: 01/15/2025 - palpitations  Last AWV: 09/26/2024  Return to clinic: 3 mo  Last BP: 02/04/2025 - 122/60  Future Appointments   Date Time Provider Department Center   2/13/2025 11:00 AM Courtney Barillas MD [id number]     Last labs:   Component      Latest Ref Rng 9/5/2024 1/21/2025   Glucose      70 - 99 mg/dL  153 (H)    Sodium      136 - 145 mmol/L  142    Potassium      3.5 - 5.1 mmol/L  3.3 (L)    Chloride      98 - 112 mmol/L  106    Carbon Dioxide, Total      21.0 - 32.0 mmol/L  30.0    ANION GAP      0 - 18 mmol/L  6    BUN      9 - 23 mg/dL  18    CREATININE      0.70 - 1.30 mg/dL  0.96    CALCIUM      8.7 - 10.6 mg/dL  9.2    CALCULATED OSMOLALITY      275 - 295 mOsm/kg  299 (H)    EGFR      >=60 mL/min/1.73m2  78    Cholesterol, Total      mg/dL 126 (E)    HDL Cholesterol      mg/dL 64 (E)    DIRECT LDL      mg/dL 51 (E)    Triglycerides      mg/dL 39 (E)    Chol/HDL Ratio 2.00 (E)    NON-HDL CHOLESTEROL      mg/dL 62 (E)       Legend:  (H) High  (L) Low  (E) External lab result    Medications refilled per protocol

## 2025-02-13 ENCOUNTER — OFFICE VISIT (OUTPATIENT)
Facility: LOCATION | Age: 85
End: 2025-02-13
Payer: MEDICARE

## 2025-02-13 DIAGNOSIS — R04.0 EPISTAXIS: Primary | ICD-10-CM

## 2025-02-13 PROCEDURE — 31231 NASAL ENDOSCOPY DX: CPT | Performed by: STUDENT IN AN ORGANIZED HEALTH CARE EDUCATION/TRAINING PROGRAM

## 2025-02-13 PROCEDURE — 99212 OFFICE O/P EST SF 10 MIN: CPT | Performed by: STUDENT IN AN ORGANIZED HEALTH CARE EDUCATION/TRAINING PROGRAM

## 2025-02-13 NOTE — PROGRESS NOTES
Jose Angel Kincaid is a 84 year old male.   Chief Complaint   Patient presents with    Follow - Up     Sinus      HPI:   84 year old male presenting for follow up of epistaxis. Pt had severe epistaxis requiring bilateral rhinorocket placement after which he underwent bilateral internal maxillary artery IR embolization on 1/19.  He denies epistaxis since discharge.  He has been using saline nasal spray every 3-4 hours at home and obtained a humidifier.    Current Outpatient Medications   Medication Sig Dispense Refill    amLODIPine 5 MG Oral Tab Take 1 tablet (5 mg total) by mouth daily. 90 tablet 2    atorvastatin 40 MG Oral Tab Take 1 tablet (40 mg total) by mouth nightly. 90 tablet 2    Potassium Chloride ER 10 MEQ Oral Tab CR Take 1 tablet (10 mEq total) by mouth daily. 90 tablet 3    Melatonin 3 MG Oral Cap Take 1 capsule (3 mg total) by mouth nightly as needed.      acetaminophen 325 MG Oral Tab Take 2 tablets (650 mg total) by mouth every 6 (six) hours as needed for Pain.      sodium chloride 0.65 % Nasal Solution 1 spray by Nasal route every 4 (four) hours as needed for congestion.      polyethylene glycol, PEG 3350, 17 g Oral Powd Pack Take 17 g by mouth daily as needed.      aspirin 81 MG Oral Tab EC Take 1 tablet (81 mg total) by mouth every morning. 30 tablet 0    bumetanide (BUMEX) 1 MG Oral Tab One daily am 90 tablet 3    metoprolol succinate ER 25 MG Oral Tablet 24 Hr Take 1 tablet (25 mg total) by mouth in the morning and 1 tablet (25 mg total) before bedtime. 180 tablet 3    tamsulosin 0.4 MG Oral Cap TAKE ONE CAPSULE BY MOUTH ONE TIME DAILY 90 capsule 3    Omeprazole 40 MG Oral Capsule Delayed Release Take 1 capsule (40 mg total) by mouth daily. 90 capsule 3    ergocalciferol 1.25 MG (10095 UT) Oral Cap Take 1 capsule (50,000 Units total) by mouth every 14 (fourteen) days. 6 capsule 3    Apoaequorin (PREVAGEN) 10 MG Oral Cap Take 1 tablet by mouth daily.      fluticasone propionate 50 MCG/ACT  Inhalation Aerosol Powder, Breath Activated Inhale 1 puff into the lungs as needed. 1 each 3    ascorbic acid 1000 MG Oral Tab Take 0.5 tablets (500 mg total) by mouth 2 (two) times daily.      Selenium 100 MCG Oral Tab Take 1 tablet (100 mcg total) by mouth every evening. 60 tablet 0    magnesium 250 MG Oral Tab Take 1 tablet (250 mg total) by mouth every other day. Magnesium oxide 250mg every other day. 30 tablet 0    omega-3 fatty acids 1000 MG Oral Cap Take 1,000 mg by mouth daily.      Nutritional Supplements (JUICE PLUS FIBRE OR) Take 2 tablets by mouth in the morning, at noon, and at bedtime. (Patient not taking: Reported on 2/4/2025)      Zinc 50 MG Oral Tab Take 1 tablet by mouth one time.      Coenzyme Q10 (COQ10) 100 MG Oral Cap Take 1 capsule by mouth at bedtime.      Glucosamine-Chondroit-Vit C-Mn (GLUCOSAMINE CHONDR 1500 COMPLX OR) Take 1 tablet by mouth 2 (two) times daily.      Calcium Carbonate-Vitamin D (CALTRATE 600+D OR) Take 1 tablet by mouth every other day.        Past Medical History:    Arteriosclerosis of thoracic aorta    CT incidental December 2019    Benign non-nodular prostatic hyperplasia with lower urinary tract symptoms    By imaging and modest symptoms     Bilateral cataracts    Formatting of this note might be different from the original.  Overview:   MODEST   -2014/2015    Bilateral plantar fasciitis    BPH (benign prostatic hypertrophy)    CAD (coronary artery disease)    LAD   STENTED   JONAS  -  9/2013 DR ZABALA  -  45%  LEFT   MAIN ALSO   SEEN   LV  45%      Carotid artery plaque     15-49%    9/2019     30% bilateral December 2017    Cataract    COPD (chronic obstructive pulmonary disease) (HCC)    COVID-19    3 days of cough, runny nose, low saturations needing oxygen.    Elevated C-reactive protein (CRP)    Formatting of this note might be different from the original.  Overview:   September 2016 will repeat occurred during infection viral    Elevated lipoprotein(a)     Essential hypertension    Herpes    Hiatal hernia with GERD    History of migraine    EXERCISE X 4  SINCE 2012     Hyperlipidemia    Inguinal hernia    Lumbar radiculopathy    Surgery 3/2023    Mass of upper lobe of left lung    resolved    Mitral regurgitation    mild-last echo 1/2025    OA (osteoarthritis)    hips and hands    Osteopenia    T     -2.1    2014     Pneumonia due to organism    Polycystic renal disease    Prediabetes    Skin cancer    New  Episode  Left  Cheek  -   9/2018  -    SQUAMOUS   NOSE.   F/U DR DEE  2X  YEARLY  -   WITH  RECENT   AK'S    Squamous cell carcinoma    nose x 3 and hand    Vitamin D deficiency      Social History:  Social History     Socioeconomic History    Marital status: Single    Number of children: 0    Years of education: 16 college   Occupational History    Occupation: retired   Tobacco Use    Smoking status: Never    Smokeless tobacco: Never   Vaping Use    Vaping status: Never Used   Substance and Sexual Activity    Alcohol use: Yes     Comment: occ beer    Drug use: No    Sexual activity: Never   Other Topics Concern    Caffeine Concern Yes     Comment: some    Stress Concern No    Weight Concern No    Special Diet No    Exercise Yes     Comment: vigorous    Seat Belt Yes   Social History Narrative    Single    No kids    Retired sales (chemical sales to steel industry)    No tobacco, drugs    1 wine 5-6x/wk     Social Drivers of Health     Food Insecurity: No Food Insecurity (1/18/2025)    Food Insecurity     Food Insecurity: Never true   Transportation Needs: No Transportation Needs (1/18/2025)    Transportation Needs     Lack of Transportation: No   Housing Stability: Low Risk  (1/18/2025)    Housing Stability     Housing Instability: No      Past Surgical History:   Procedure Laterality Date    Angioplasty (coronary)  09/2013 9/13 cath 40% ostial LM, 80-90% mid to distal LAD (2.5 x 26mm Resolute Integrity) EF 45%    Appendectomy  1954    Cataract       Colonoscopy  01/2004    Colonoscopy N/A 10/31/2014    Procedure: COLONOSCOPY;  Surgeon: Ken Vinson MD;  Location:  ENDOSCOPY    Hernia surgery  1997    right    Injection, anesthetic/steroid, paravertebral facet joint/nerve; lumbar/sacral, single level  04/06/2022    Parminder    Ir angiogram cerebral carotid bilateral  1/19/2025    Ir aspiration/injection  04/28/2022    Parminder    Ir head and neck embolization  1/19/2025    Lumbar spine surgery  03/08/2023        Skin surgery  01/08/2020    MOHS SCCIS Right mid posterior scalp    Skin surgery  02/11/2020    MOHS, BCC, right preauricular - Dr. ABA Harris         REVIEW OF SYSTEMS:   GENERAL HEALTH: feels well otherwise  GENERAL : denies fever, chills, sweats, weight loss, weight gain  SKIN: denies any unusual skin lesions or rashes  RESPIRATORY: denies shortness of breath with exertion  NEURO: denies headaches    EXAM:   There were no vitals taken for this visit.    System Findings Details   Constitutional  Overall appearance - Normal.   Head/Face  Facial features -- Normal. Skull - Normal.   Eyes  Sclera white, pupils equal and round, EOMI   Ears  External ears normal in appearance, EACs patent, TMs intact bilaterally, no evidence of middle ear effusion   Nose  External nose normal in appearance, nares patent, dried crusting on left, septum straight, no epistaxis   Throat  Posterior pharynx clear, uvula midline, tonsils 1+   Oral cavity  Lips normal in appearance, mucous membranes clear, no masses, FOM soft, tongue without lesions   Neck  Trachea midline   Neurological  Memory - Normal. Cranial nerves - Cranial nerves II through XII grossly intact.     Procedure: Rigid diagnostic nasal endoscopy   Surgeon: Courtney Barillas MD, ALOK  Face-to-face timeout was performed and verbal consent was obtained for the diagnostic procedure. Patient's nose was decongested and anesthetized with oxymetazoline and lidocaine sprays. After adequate time was allowed for these to  take effect, bilateral nasal endoscopy was performed.   Findings: A 0-degree rigid endoscope was passed through the patient's bilateral naris via a 3 pass maneuver. The first pass was along the floor of the nose to the nasopharynx. The second pass was to the area of the middle meatus. The third pass was to the area of the sphenoethmoidal recess.     Septum: Bidirectional deviation at mid aspect with left septal spur    Right nasal cavity:   Inferior turbinate: 1+   Inferior meatus: Clear, no discharge, no polyps/masses/lesions   Middle meatus: Clear, no discharge, no polyps/masses/lesions   Middle turbinate: Normal in size  Sphenoethmoidal recess: Clear, no discharge, no polyps/masses/lesions     Left nasal cavity:   Inferior turbinate: 1+   Inferior meatus: Clear, no discharge, no polyps/masses/lesions   Middle meatus: Clear, no discharge, no polyps/masses/lesions   Middle turbinate: Normal in size  Sphenoethmoidal recess: Clear, no discharge, no polyps/masses/lesions     Nasopharynx: Clear, no discharge, no masses/lesions      ASSESSMENT AND PLAN:   84 year old male presenting for follow up of epistaxis.    -Reviewed use of nasal saline spray, humidifier, vaseline to nares, avoiding nose picking  -Educated on use of afrin and firm pressure to anterior nares for 15 minutes in case of repeat bleeding, seek emergency evaluation if bleeding persists  -All questions answered    The patient indicates understanding of these issues and agrees to the plan.      Courtney Barillas MD, ALOK  Facial Plastic & Reconstructive Surgery, Otolaryngology-Head & Neck Surgery  [facility]    This note was prepared using Dragon [employer] voice recognition dictation software. As a result, errors may occur. When identified, these errors have been corrected. While every attempt is made to correct errors during dictation, discrepancies may still exist.

## 2025-02-13 NOTE — PATIENT INSTRUCTIONS
Nosebleed (Adult)    Bleeding from the nose most commonly occurs because of injury or drying and cracking of the inner lining of the nose. Most nosebleeds are because of dry air or nose-picking. They can occur during a common cold or an allergy attack. They can also occur on a very hot day, or from dry air in the winter. Most nosebleeds are not serious and are located towards the front of the nose. Nosebleeds that are located toward the back of the nose, closer to the throat, are less common, but may be harder to control and become more serious.  In cases where the bleeding is not able to be controlled, the site of the bleeding is found, and may be cauterized. This means it's treated to cause a blood clot to form. This may be done with a chemical, heat, or electricity. If the bleeding continues after the site is cauterized, or if the site can't be found, packing may be put in your nose. This is to apply pressure and stop the bleeding. The packing may be made of gauze or sponge. A small balloon catheter is sometimes used. These must be removed by your healthcare provider. Some types of packing dissolve on their own. In rare cases, surgery or embolization (sealing of the nose blood vessels using a catheter through an artery) is needed to stop a nosebleed. If you are taking blood thinning (anticoagulant) medicine, you may have a blood test and be advised to stop taking certain medicines for a while.  Home care  If packing was put in your nose, unless told otherwise, don't pull on it or try to remove it yourself. You will be given an appointment to have it removed. You may also have been given antibiotics to prevent a sinus infection. If so, finish all of the medicine.  Don't blow your nose for 12 hours after the bleeding stops. This will allow a strong blood clot to form. After that, if you have to blow your nose, do it very gently. Don't pick your nose. This may restart bleeding.  Don't drink alcohol or hot liquids  for the next 2 days. Alcohol or hot liquids in your mouth can dilate blood vessels in your nose. This can cause bleeding to start again.  Don't take ibuprofen, naproxen, or medicines that contain aspirin. These thin the blood and may cause your nose to bleed. You may take acetaminophen for pain, unless another pain medicine was prescribed. Talk with your healthcare provider before using acetaminophen if you have chronic liver disease.  If the bleeding starts again, sit up and lean forward to prevent swallowing blood. Pinch your nose tightly on both sides, as shown above, for 10 to 15 minutes. Time yourself. Don’t release the pressure on your nose until at least 10 minutes is up. If bleeding doesn't stop, continue to pinch your nose and, if the bleeding is a small amount, call your healthcare provider. If bleeding is heavy, call 911 or return to this facility.  If you have a cold, allergies, or dry nasal membranes, lubricate the nasal passages. Gently apply a small amount of petroleum jelly inside the nose with a cotton swab twice a day (morning and night).  Don't overheat your home. This can dry the air and make your condition worse.  Put a humidifier in the room where you sleep. This will add moisture to the air. Clean the humidifier as advised by the .  Use a saline nasal spray to keep nasal passages moist.  Don't pick your nose. Keep fingernails trimmed to decrease risk of bleeds.  Don't smoke.    Follow-up care  -Ocean nasal spray at least twice daily  -Continue use of humidifier  -Vaseline to nasal rim daily  -Avoid nose picking  -In case of repeat bleed, apply afrin nasal spray and hold pressure to front of nose for 15 minutes. If bleeding is still not controlled, seek medical attention.   When to get medical advice  Call your healthcare provider right away if any of these occur.  You have intermittent, recurrent, small amounts of bleeding that you can control for a while  Fever of 100.4ºF (38ºC)  or higher, or as directed by your healthcare provider  Headache  Sinus or facial pain  Call 911  Call 911 or get medical care right away if any of the following occur:  Dizziness, weakness, or fainting  Shortness of breath or trouble breathing  You have another nosebleed that you can't control, or with heavy bleeding  You become abnormally tired or confused  Anshul last reviewed this educational content on 7/1/2022 © 2000-2023 [employer]. All rights reserved. This information is not intended as a substitute for professional medical care. Always follow your healthcare professional's instructions.

## 2025-02-14 ENCOUNTER — PATIENT MESSAGE (OUTPATIENT)
Dept: INTERNAL MEDICINE CLINIC | Facility: CLINIC | Age: 85
End: 2025-02-14

## 2025-02-14 NOTE — TELEPHONE ENCOUNTER
Per EMR, was admitted to hospital 01/18-01/24/2025 for Epistaxis   Followed up with ENT 02/13/2025     TC to Patient ([phone]) Informed PCP is out of office until 02/24/2025, is agreeable to wait until his return, states he has been doing well post hospitalization    Future Appointments   Date Time Provider Department Center   2/26/2025  3:15 PM Cl De Leon MD [facility] [place]

## 2025-02-26 ENCOUNTER — LAB ENCOUNTER (OUTPATIENT)
Dept: LAB | Facility: HOSPITAL | Age: 85
End: 2025-02-26
Attending: INTERNAL MEDICINE
Payer: MEDICARE

## 2025-02-26 ENCOUNTER — OFFICE VISIT (OUTPATIENT)
Dept: INTERNAL MEDICINE CLINIC | Facility: CLINIC | Age: 85
End: 2025-02-26
Payer: MEDICARE

## 2025-02-26 VITALS
OXYGEN SATURATION: 99 % | WEIGHT: 142 LBS | HEIGHT: 66 IN | TEMPERATURE: 98 F | BODY MASS INDEX: 22.82 KG/M2 | HEART RATE: 71 BPM | DIASTOLIC BLOOD PRESSURE: 50 MMHG | SYSTOLIC BLOOD PRESSURE: 146 MMHG

## 2025-02-26 DIAGNOSIS — L24.5 IRRITANT CONTACT DERMATITIS DUE TO OTHER CHEMICAL PRODUCTS: ICD-10-CM

## 2025-02-26 DIAGNOSIS — R04.0 SEVERE EPISTAXIS: Primary | ICD-10-CM

## 2025-02-26 DIAGNOSIS — Z23 NEED FOR PNEUMOCOCCAL 20-VALENT CONJUGATE VACCINATION: ICD-10-CM

## 2025-02-26 DIAGNOSIS — E87.6 HYPOKALEMIA: ICD-10-CM

## 2025-02-26 DIAGNOSIS — D50.0 IRON DEFICIENCY ANEMIA DUE TO CHRONIC BLOOD LOSS: ICD-10-CM

## 2025-02-26 LAB
ANION GAP SERPL CALC-SCNC: 4 MMOL/L (ref 0–18)
BUN BLD-MCNC: 21 MG/DL (ref 9–23)
CALCIUM BLD-MCNC: 9.4 MG/DL (ref 8.7–10.6)
CHLORIDE SERPL-SCNC: 102 MMOL/L (ref 98–112)
CO2 SERPL-SCNC: 32 MMOL/L (ref 21–32)
CREAT BLD-MCNC: 0.97 MG/DL
EGFRCR SERPLBLD CKD-EPI 2021: 77 ML/MIN/1.73M2 (ref 60–?)
FASTING STATUS PATIENT QL REPORTED: NO
GLUCOSE BLD-MCNC: 108 MG/DL (ref 70–99)
HGB BLD-MCNC: 12.4 G/DL
MAGNESIUM SERPL-MCNC: 2.1 MG/DL (ref 1.6–2.6)
OSMOLALITY SERPL CALC.SUM OF ELEC: 290 MOSM/KG (ref 275–295)
POTASSIUM SERPL-SCNC: 3.6 MMOL/L (ref 3.5–5.1)
SODIUM SERPL-SCNC: 138 MMOL/L (ref 136–145)

## 2025-02-26 PROCEDURE — 36415 COLL VENOUS BLD VENIPUNCTURE: CPT

## 2025-02-26 PROCEDURE — 80048 BASIC METABOLIC PNL TOTAL CA: CPT

## 2025-02-26 PROCEDURE — G2211 COMPLEX E/M VISIT ADD ON: HCPCS | Performed by: INTERNAL MEDICINE

## 2025-02-26 PROCEDURE — G0009 ADMIN PNEUMOCOCCAL VACCINE: HCPCS | Performed by: INTERNAL MEDICINE

## 2025-02-26 PROCEDURE — 90677 PCV20 VACCINE IM: CPT | Performed by: INTERNAL MEDICINE

## 2025-02-26 PROCEDURE — 83735 ASSAY OF MAGNESIUM: CPT

## 2025-02-26 PROCEDURE — 99214 OFFICE O/P EST MOD 30 MIN: CPT | Performed by: INTERNAL MEDICINE

## 2025-02-26 PROCEDURE — 85018 HEMOGLOBIN: CPT

## 2025-02-26 RX ORDER — TRIAMCINOLONE ACETONIDE 1 MG/G
CREAM TOPICAL
Qty: 30 G | Refills: 1 | Status: SHIPPED | OUTPATIENT
Start: 2025-02-26

## 2025-02-26 RX ORDER — ASPIRIN 81 MG/1
TABLET ORAL
COMMUNITY
Start: 2025-02-26

## 2025-02-26 RX ORDER — POTASSIUM CHLORIDE 750 MG/1
TABLET, EXTENDED RELEASE ORAL
COMMUNITY
Start: 2025-02-26 | End: 2025-02-27

## 2025-02-26 RX ORDER — TRIAMCINOLONE ACETONIDE 1 MG/G
CREAM TOPICAL
COMMUNITY
Start: 2025-01-14

## 2025-02-27 ENCOUNTER — PATIENT MESSAGE (OUTPATIENT)
Dept: INTERNAL MEDICINE CLINIC | Facility: CLINIC | Age: 85
End: 2025-02-27

## 2025-02-27 RX ORDER — POTASSIUM CHLORIDE 750 MG/1
10 TABLET, EXTENDED RELEASE ORAL DAILY
COMMUNITY
Start: 2025-02-27

## 2025-02-27 NOTE — PROGRESS NOTES
Subjective:   Patient ID: Jose Angel Kincaid is a 84 year old male.followup severe epistaxis-resolved    After severe ER visits for epistaxis finally admitted to  1/18-1/24 requiring arterial emboliztion. No transfusion. Went to rehab 1/24-2/7 [place]. No problems since being at home from 2/7 to present. Saw ENT about 1 week ago and told 'normal exam\"        History/Other:   Review of Systems   Constitutional: Negative.    HENT:  Positive for nosebleeds (resolved).    Eyes:         Cataracts removed both eyes   Respiratory:  Negative for shortness of breath.         Covid in 2022, hx pneumonia   Cardiovascular:  Positive for palpitations. Negative for chest pain and leg swelling.        HTN and lipids on meds-amlodipine reduced 5mg bid to every day, stent   Gastrointestinal:  Negative for abdominal pain, diarrhea, nausea and vomiting.        Reflux controlled on med, inguinal hernia repair, appendectomy     Endocrine: Negative.    Genitourinary:  Positive for frequency (Bumex).        Renal cysts, BPH   Musculoskeletal:  Positive for arthralgias. Negative for back pain (gone since OR 3/2023) and gait problem.        Lumbar spinal stenosis-back OR 3/2023 doing well   Skin:  Positive for rash (new last 2 weeks across abdomen).        Hx multiple skin cancers   Neurological:  Negative for weakness.   Hematological:  Bruises/bleeds easily (ASA).   Psychiatric/Behavioral: Negative.           Current Outpatient Medications   Medication Sig Dispense Refill    triamcinolone 0.1 % External Cream Apply to affected areas on the right arm twice daily. Once resolved, switch to moisturizers. Avoid face, underarms, and groin.      triamcinolone 0.1 % External Cream Apply thin film to abdominal rash twice daily. 30 g 1    aspirin 81 MG Oral Tab EC Take one tablet (81 mg total) by mouth once daily on Monday, Wednesday, and Friday      Psyllium 58.6 % Oral Powder Take 1 tablespoon mixed in any beverage by mouth once daily       Potassium Chloride ER 10 MEQ Oral Tab CR Take one tablet (10 mEq total) by mouth once daily on Monday, Wednesday, and Friday      amLODIPine 5 MG Oral Tab Take 1 tablet (5 mg total) by mouth daily. 90 tablet 2    atorvastatin 40 MG Oral Tab Take 1 tablet (40 mg total) by mouth nightly. 90 tablet 2    Melatonin 3 MG Oral Cap Take 1 capsule (3 mg total) by mouth nightly as needed.      acetaminophen 325 MG Oral Tab Take 2 tablets (650 mg total) by mouth every 6 (six) hours as needed for Pain.      sodium chloride 0.65 % Nasal Solution 1 spray by Nasal route every 4 (four) hours as needed for congestion.      bumetanide (BUMEX) 1 MG Oral Tab One daily am 90 tablet 3    metoprolol succinate ER 25 MG Oral Tablet 24 Hr Take 1 tablet (25 mg total) by mouth in the morning and 1 tablet (25 mg total) before bedtime. 180 tablet 3    tamsulosin 0.4 MG Oral Cap TAKE ONE CAPSULE BY MOUTH ONE TIME DAILY 90 capsule 3    Omeprazole 40 MG Oral Capsule Delayed Release Take 1 capsule (40 mg total) by mouth daily. 90 capsule 3    ergocalciferol 1.25 MG (03590 UT) Oral Cap Take 1 capsule (50,000 Units total) by mouth every 14 (fourteen) days. 6 capsule 3    Apoaequorin (PREVAGEN) 10 MG Oral Cap Take 1 tablet by mouth daily.      fluticasone propionate 50 MCG/ACT Inhalation Aerosol Powder, Breath Activated Inhale 1 puff into the lungs as needed. 1 each 3    ascorbic acid 1000 MG Oral Tab Take 0.5 tablets (500 mg total) by mouth 2 (two) times daily.      Selenium 100 MCG Oral Tab Take 1 tablet (100 mcg total) by mouth every evening. 60 tablet 0    magnesium 250 MG Oral Tab Take 1 tablet (250 mg total) by mouth every other day. Magnesium oxide 250mg every other day. 30 tablet 0    omega-3 fatty acids 1000 MG Oral Cap Take 1,000 mg by mouth daily.      Nutritional Supplements (JUICE PLUS FIBRE OR) Take 2 tablets by mouth in the morning, at noon, and at bedtime.      Zinc 50 MG Oral Tab Take 1 tablet by mouth one time.      Coenzyme Q10  (COQ10) 100 MG Oral Cap Take 1 capsule by mouth at bedtime.      Glucosamine-Chondroit-Vit C-Mn (GLUCOSAMINE CHONDR 1500 COMPLX OR) Take 1 tablet by mouth 2 (two) times daily.      Calcium Carbonate-Vitamin D (CALTRATE 600+D OR) Take 1 tablet by mouth every other day.       Allergies:Allergies[1]    Objective:   Physical Exam  Constitutional:       General: He is not in acute distress.     Appearance: He is not ill-appearing.   HENT:      Nose:      Comments: Redness both sides septum no active bleeding  Cardiovascular:      Rate and Rhythm: Normal rate and regular rhythm.      Heart sounds: Normal heart sounds.   Pulmonary:      Effort: No respiratory distress.      Breath sounds: Normal breath sounds.   Abdominal:      Tenderness: There is no abdominal tenderness.   Musculoskeletal:         General: Tenderness (lumbar incision) present.      Right lower leg: No edema.      Left lower leg: No edema.   Skin:     Comments: Multiple surgical scars   Neurological:      Mental Status: He is alert. Mental status is at baseline.      Motor: No weakness.      Gait: Gait normal.   Psychiatric:         Mood and Affect: Mood normal.         Thought Content: Thought content normal.       Active Problems:  Patient Active Problem List   Diagnosis    Polycystic kidney    Skin cancer    CAD (coronary artery disease)    History of migraine    Osteopenia    Carotid artery plaque    Dyslipidemia    Benign prostatic hyperplasia    Elevated lipoprotein(a)    Arteriosclerosis of thoracic aorta    Benign essential HTN    Hiatal hernia with GERD    COPD (chronic obstructive pulmonary disease) (HCC)    Lumbar radiculopathy    Elevated C-reactive protein (CRP)    Bilateral cataracts    Lumbar spondylosis    Prediabetes    Mitral regurgitation    Epistaxis    Syncope and collapse       Past Medical History:  Past Medical History:    Arteriosclerosis of thoracic aorta    CT incidental December 2019    Benign non-nodular prostatic hyperplasia  with lower urinary tract symptoms    By imaging and modest symptoms     Bilateral cataracts    Formatting of this note might be different from the original.  Overview:   MODEST   -2014/2015    Bilateral plantar fasciitis    BPH (benign prostatic hypertrophy)    CAD (coronary artery disease)    LAD   STENTED   JONAS  -  9/2013 DR ZABALA  -  45%  LEFT   MAIN ALSO   SEEN   LV  45%      Carotid artery plaque     15-49%    9/2019     30% bilateral December 2017    Cataract    COPD (chronic obstructive pulmonary disease) (HCC)    COVID-19    3 days of cough, runny nose, low saturations needing oxygen.    Elevated C-reactive protein (CRP)    Formatting of this note might be different from the original.  Overview:   September 2016 will repeat occurred during infection viral    Elevated lipoprotein(a)    Essential hypertension    Herpes    Hiatal hernia with GERD    History of migraine    EXERCISE X 4  SINCE 2012     Hyperlipidemia    Inguinal hernia    Lumbar radiculopathy    Surgery 3/2023    Mass of upper lobe of left lung    resolved    Mitral regurgitation    mild-last echo 1/2025    OA (osteoarthritis)    hips and hands    Osteopenia    T     -2.1    2014     Pneumonia due to organism    Polycystic renal disease    Prediabetes    Skin cancer    New  Episode  Left  Cheek  -   9/2018  -    SQUAMOUS   NOSE.   F/U DR DEE  2X  YEARLY  -   WITH  RECENT   AK'S    Squamous cell carcinoma    nose x 3 and hand    Vitamin D deficiency       Past Surgical History:  Past Surgical History:   Procedure Laterality Date    Angioplasty (coronary)  09/2013 9/13 cath 40% ostial LM, 80-90% mid to distal LAD (2.5 x 26mm Resolute Integrity) EF 45%    Appendectomy  1954    Cataract      Colonoscopy  01/2004    Colonoscopy N/A 10/31/2014    Procedure: COLONOSCOPY;  Surgeon: Ken Vinson MD;  Location:  ENDOSCOPY    Hernia surgery  1997    right    Injection, anesthetic/steroid, paravertebral facet joint/nerve; lumbar/sacral, single  level  04/06/2022    Peng    Ir angiogram cerebral carotid bilateral  1/19/2025    Ir aspiration/injection  04/28/2022    Parminder    Ir head and neck embolization  1/19/2025    Lumbar spine surgery  03/08/2023        Skin surgery  01/08/2020    MOHS SCCIS Right mid posterior scalp    Skin surgery  02/11/2020    MOHS, BCC, right preauricular - Dr. ABA Harris       Family History:  Family History   Problem Relation Age of Onset    Pacemaker Mother     Hypertension Mother        Social History:  Social History     Socioeconomic History    Marital status: Single     Spouse name: Not on file    Number of children: 0    Years of education: 16 college    Highest education level: Not on file   Occupational History    Occupation: retired   Tobacco Use    Smoking status: Never    Smokeless tobacco: Never   Vaping Use    Vaping status: Never Used   Substance and Sexual Activity    Alcohol use: Yes     Comment: occ beer/wine    Drug use: No    Sexual activity: Never   Other Topics Concern     Service Not Asked    Blood Transfusions Not Asked    Caffeine Concern Yes     Comment: some    Occupational Exposure Not Asked    Hobby Hazards Not Asked    Sleep Concern Not Asked    Stress Concern No    Weight Concern No    Special Diet No    Back Care Not Asked    Exercise Yes     Comment: vigorous    Bike Helmet Not Asked    Seat Belt Yes    Self-Exams Not Asked   Social History Narrative    Single    No kids    Retired sales (chemical sales to steel industry)    No tobacco, drugs    1 wine 5-6x/wk     Social Drivers of Health     Food Insecurity: No Food Insecurity (1/18/2025)    Food Insecurity     Food Insecurity: Never true   Transportation Needs: No Transportation Needs (1/18/2025)    Transportation Needs     Lack of Transportation: No     Car Seat: Not on file   Stress: Not on file   Housing Stability: Low Risk  (1/18/2025)    Housing Stability     Housing Instability: No     Housing Instability Emergency: Not on file      Angelina or Al: Not on file       Health Maintenance:    Immunization History   Administered Date(s) Administered    Covid-19 Vaccine Pfizer 30 mcg/0.3 ml 02/02/2021, 02/23/2021, 09/24/2021, 05/13/2022    Covid-19 Vaccine Pfizer Bivalent 30mcg/0.3mL 09/14/2022    Covid-19 Vaccine Pfizer Juan-Sucrose 30 mcg/0.3 ml 05/13/2022    DTAP 09/14/2014    FLU VAC High Dose 65 YRS & Older PRSV Free (81018) 10/08/2014, 10/13/2015, 09/23/2019, 09/10/2020, 10/23/2021, 10/10/2022, 09/12/2023    FLUAD High Dose 65 yr and older (88252) 10/09/2017, 09/24/2018    FLUZONE 6 months and older PFS 0.5 ml (59811) 09/10/2020    Fluzone Vaccine Medicare () 10/08/2014, 10/13/2015, 10/24/2016, 09/23/2019    High Dose Fluzone Influenza Vaccine, 65yr+ PF 0.5mL (68160) 10/24/2016, 10/17/2024    Influenza 09/14/2009, 10/18/2012, 10/11/2013    Pfizer Covid-19 Vaccine 30mcg/0.3ml 12yrs+ 10/12/2023, 09/26/2024    Pneumococcal (Prevnar 13) 09/14/2015    Pneumococcal Conjugate PCV20 02/26/2025    Pneumovax 23 09/08/2005    RSV, recombinant, RSVpreF, adjuvanted (Arexvy) 11/29/2023    TDAP 09/14/2014, 06/05/2019, 07/15/2024    Tetanus 09/15/2006    Zoster Vaccine Live (Zostavax) 01/01/2008    Zoster Vaccine Recombinant Adjuvanted (Shingrix) 10/17/2019, 02/22/2020       Vitals:  Vitals:    02/26/25 1508 02/26/25 1606   BP: 140/62 146/50   BP Location: Left arm Left arm   Patient Position: Sitting Sitting   Cuff Size: adult adult   Pulse: 71    Temp: 98.4 °F (36.9 °C)    SpO2: 99%    Weight: 142 lb (64.4 kg)    Height: 5' 6\" (1.676 m)      Body mass index is 22.92 kg/m².        Assessment & Plan:   1. Severe epistaxis    2. Hypokalemia    3. Iron deficiency anemia due to chronic blood loss    4. Irritant contact dermatitis due to other chemical products    5. Need for pneumococcal 20-valent conjugate vaccination      Epistaxis resolved but required embolization  Hgb up to 12+ no iron needed  Pot 3.6 want 4.0 so inc 3/week to daily on  Bumex  Requested Pneumonia vax-agree  Prefer he reduce ASA 81 every day to 3/week as nasal mucosa reddened and want to avoid a re-bleed  Contact dermatitis across abdomen probably from a contact at [place]  Will upgrade his OTC steroid creme to stronger steroid creme RX  RTC 1mo for ongoing complex care      Orders Placed This Encounter   Procedures    Hemoglobin [E]    Basic Metabolic Panel (8) [E]    Magnesium [E]    Prevnar 20 (PCV20) [14499]       Meds This Visit:  Requested Prescriptions     Signed Prescriptions Disp Refills    triamcinolone 0.1 % External Cream 30 g 1     Sig: Apply thin film to abdominal rash twice daily.       Imaging & Referrals:  PCV20 VACCINE FOR INTRAMUSCULAR USE         [1]   Allergies  Allergen Reactions    Codeine OTHER (SEE COMMENTS)     Throat  tightness    Hctz [Hydrochlorothiazide] UNKNOWN    Nsaids OTHER (SEE COMMENTS)     GI    Zofran [Ondansetron] OTHER (SEE COMMENTS)     Constipation     Pepcid [Famotidine] OTHER (SEE COMMENTS)     neck and shoulder pains

## 2025-02-27 NOTE — TELEPHONE ENCOUNTER
Per Dr. De Leon regarding Patient's 02/26/2025 labs:  Hgb good no need for iron, pot 3.6 low normal want  ~4.0 so inc Pot 10 from 3/week to daily     Patient informed, verbalized understanding  Offered to send in a new Potassium script to pharmacy but patient states he recently p/u #90 from a recent refill - last script 02/10/2025 #90 R-3    Patient will f/u when he needs a refill

## 2025-03-05 ENCOUNTER — OFFICE VISIT (OUTPATIENT)
Dept: OTOLARYNGOLOGY | Facility: CLINIC | Age: 85
End: 2025-03-05

## 2025-03-05 DIAGNOSIS — R04.0 EPISTAXIS: Primary | ICD-10-CM

## 2025-03-05 PROCEDURE — 99213 OFFICE O/P EST LOW 20 MIN: CPT | Performed by: STUDENT IN AN ORGANIZED HEALTH CARE EDUCATION/TRAINING PROGRAM

## 2025-03-05 PROCEDURE — 31231 NASAL ENDOSCOPY DX: CPT | Performed by: STUDENT IN AN ORGANIZED HEALTH CARE EDUCATION/TRAINING PROGRAM

## 2025-03-05 NOTE — PROGRESS NOTES
Jose Angel Kincaid is a 84 year old male.   Chief Complaint   Patient presents with    Sinus Problem     Patient Presents with: Nosebleed, post nasal drip with blood      HPI:   Is an 84-year-old man who presents in follow-up regarding epistaxis.  He had a hospitalization at [facility] From January 18 through January 24.  He underwent bilateral embolization of the internal maxillary arteries on January 19.  He had both packs pulled out and has not had a significant bleeding episode since.  He reports still coughing out some darkish colored clots very little bright red blood if any is present.       Current Outpatient Medications   Medication Sig Dispense Refill    Potassium Chloride ER 10 MEQ Oral Tab CR 1 tablet (10 mEq total) daily.      triamcinolone 0.1 % External Cream Apply to affected areas on the right arm twice daily. Once resolved, switch to moisturizers. Avoid face, underarms, and groin.      triamcinolone 0.1 % External Cream Apply thin film to abdominal rash twice daily. 30 g 1    aspirin 81 MG Oral Tab EC Take one tablet (81 mg total) by mouth once daily on Monday, Wednesday, and Friday      Psyllium 58.6 % Oral Powder Take 1 tablespoon mixed in any beverage by mouth once daily      amLODIPine 5 MG Oral Tab Take 1 tablet (5 mg total) by mouth daily. 90 tablet 2    atorvastatin 40 MG Oral Tab Take 1 tablet (40 mg total) by mouth nightly. 90 tablet 2    Melatonin 3 MG Oral Cap Take 1 capsule (3 mg total) by mouth nightly as needed.      acetaminophen 325 MG Oral Tab Take 2 tablets (650 mg total) by mouth every 6 (six) hours as needed for Pain.      sodium chloride 0.65 % Nasal Solution 1 spray by Nasal route every 4 (four) hours as needed for congestion.      bumetanide (BUMEX) 1 MG Oral Tab One daily am 90 tablet 3    metoprolol succinate ER 25 MG Oral Tablet 24 Hr Take 1 tablet (25 mg total) by mouth in the morning and 1 tablet (25 mg total) before bedtime. 180 tablet 3    tamsulosin 0.4 MG Oral  Cap TAKE ONE CAPSULE BY MOUTH ONE TIME DAILY 90 capsule 3    Omeprazole 40 MG Oral Capsule Delayed Release Take 1 capsule (40 mg total) by mouth daily. 90 capsule 3    ergocalciferol 1.25 MG (54706 UT) Oral Cap Take 1 capsule (50,000 Units total) by mouth every 14 (fourteen) days. 6 capsule 3    Apoaequorin (PREVAGEN) 10 MG Oral Cap Take 1 tablet by mouth daily.      fluticasone propionate 50 MCG/ACT Inhalation Aerosol Powder, Breath Activated Inhale 1 puff into the lungs as needed. 1 each 3    ascorbic acid 1000 MG Oral Tab Take 0.5 tablets (500 mg total) by mouth 2 (two) times daily.      Selenium 100 MCG Oral Tab Take 1 tablet (100 mcg total) by mouth every evening. 60 tablet 0    magnesium 250 MG Oral Tab Take 1 tablet (250 mg total) by mouth every other day. Magnesium oxide 250mg every other day. 30 tablet 0    omega-3 fatty acids 1000 MG Oral Cap Take 1,000 mg by mouth daily.      Nutritional Supplements (JUICE PLUS FIBRE OR) Take 2 tablets by mouth in the morning, at noon, and at bedtime.      Zinc 50 MG Oral Tab Take 1 tablet by mouth one time.      Coenzyme Q10 (COQ10) 100 MG Oral Cap Take 1 capsule by mouth at bedtime.      Glucosamine-Chondroit-Vit C-Mn (GLUCOSAMINE CHONDR 1500 COMPLX OR) Take 1 tablet by mouth 2 (two) times daily.      Calcium Carbonate-Vitamin D (CALTRATE 600+D OR) Take 1 tablet by mouth every other day.        Past Medical History:    Arteriosclerosis of thoracic aorta    CT incidental December 2019    Benign non-nodular prostatic hyperplasia with lower urinary tract symptoms    By imaging and modest symptoms     Bilateral cataracts    Formatting of this note might be different from the original.  Overview:   MODEST   -2014/2015    Bilateral plantar fasciitis    BPH (benign prostatic hypertrophy)    CAD (coronary artery disease)    LAD   STENTED   JONAS  -  9/2013 DR ZABALA  -  45%  LEFT   MAIN ALSO   SEEN   LV  45%      Carotid artery plaque     15-49%    9/2019     30% bilateral  December 2017    Cataract    COPD (chronic obstructive pulmonary disease) (HCC)    COVID-19    3 days of cough, runny nose, low saturations needing oxygen.    Elevated C-reactive protein (CRP)    Formatting of this note might be different from the original.  Overview:   September 2016 will repeat occurred during infection viral    Elevated lipoprotein(a)    Essential hypertension    Herpes    Hiatal hernia with GERD    History of migraine    EXERCISE X 4  SINCE 2012     Hyperlipidemia    Inguinal hernia    Lumbar radiculopathy    Surgery 3/2023    Mass of upper lobe of left lung    resolved    Mitral regurgitation    mild-last echo 1/2025    OA (osteoarthritis)    hips and hands    Osteopenia    T     -2.1    2014     Pneumonia due to organism    Polycystic renal disease    Prediabetes    Skin cancer    New  Episode  Left  Cheek  -   9/2018  -    SQUAMOUS   NOSE.   F/U DR DEE  2X  YEARLY  -   WITH  RECENT   AK'S    Squamous cell carcinoma    nose x 3 and hand    Vitamin D deficiency      Social History:  Social History     Socioeconomic History    Marital status: Single    Number of children: 0    Years of education: 16 college   Occupational History    Occupation: retired   Tobacco Use    Smoking status: Never    Smokeless tobacco: Never   Vaping Use    Vaping status: Never Used   Substance and Sexual Activity    Alcohol use: Yes     Comment: occ beer/wine    Drug use: No    Sexual activity: Never   Other Topics Concern    Caffeine Concern Yes     Comment: some    Stress Concern No    Weight Concern No    Special Diet No    Exercise Yes     Comment: vigorous    Seat Belt Yes   Social History Narrative    Single    No kids    Retired sales (chemical sales to steel industry)    No tobacco, drugs    1 wine 5-6x/wk     Social Drivers of Health     Food Insecurity: No Food Insecurity (1/18/2025)    Food Insecurity     Food Insecurity: Never true   Transportation Needs: No Transportation Needs (1/18/2025)     Transportation Needs     Lack of Transportation: No   Housing Stability: Low Risk  (1/18/2025)    Housing Stability     Housing Instability: No      Past Surgical History:   Procedure Laterality Date    Angioplasty (coronary)  09/2013 9/13 cath 40% ostial LM, 80-90% mid to distal LAD (2.5 x 26mm Resolute Integrity) EF 45%    Appendectomy  1954    Cataract      Colonoscopy  01/2004    Colonoscopy N/A 10/31/2014    Procedure: COLONOSCOPY;  Surgeon: Ken Vinson MD;  Location:  ENDOSCOPY    Hernia surgery  1997    right    Injection, anesthetic/steroid, paravertebral facet joint/nerve; lumbar/sacral, single level  04/06/2022    Parminder    Ir angiogram cerebral carotid bilateral  1/19/2025    Ir aspiration/injection  04/28/2022    Parminder    Ir head and neck embolization  1/19/2025    Lumbar spine surgery  03/08/2023        Skin surgery  01/08/2020    MOHS SCCIS Right mid posterior scalp    Skin surgery  02/11/2020    MOHS, BCC, right preauricular - Dr. ABA Harris         EXAM:   There were no vitals taken for this visit.    System Details   Skin Inspection - Normal.   Constitutional Overall appearance - Normal.   Head/Face Symmetric, TMJ tenderness not present    Eyes EOMI, PERRL   Right ear:  Canal clear, TM intact, no COSTA   Left ear:  Canal clear, TM intact, no COSTA   Nose: Septum midline, inferior turbinates not enlarged, nasal valves without collapse    Oral cavity/Oropharynx: No lesions or masses on inspection or palpation, tonsils symmetric    Neck: Soft without LAD, thyroid not enlarged  Voice clear/ no stridor   Other:      SCOPES AND PROCEDURES:     Nasal Endoscopy Procedure Note     Due to inability for adequate examination of the nose and nasopharynx and need for magnification to perform the examination, endoscopy was performed.  Risks and benefits were discussed with patient/family and they have given verbal consent to proceed.    Pre-operative Diagnosis:   1. Epistaxis        Post-operative  Diagnosis: Same    Procedure: Diagnostic nasal endoscopy    Anesthesia: Topical anesthetic Sacul     Surgeon Son Trinidad MD    EBL: 0cc    Procedure Detail & Findings:     After placement of topical anesthetic intranasally the endoscope was inserted into each nares and driven through the nasal cavity into the nasopharynx. The following findings were noted:    Septum: Midline  Inferior turbinates: Mild crusting of each inferior turbinate head  Middle meatus: Patent  Middle turbinates: Normal  Purulence: None noted  Polyps: None noted  Nasopharynx and eustachian tube: No masses  Other: The middle and superior meatus, the turbinates, and the spheno-ethmoid recess were inspected and seen to be without significant abnormal findings.     Condition: Stable    Complications: Patient tolerated the procedure well with no immediate complication.    Son Trinidad MD    AUDIOGRAM AND IMAGING:         IMPRESSION:   1. Epistaxis       Recommendations:  -Discussed with him it is still expected to have some old dark blood drained from his sinuses that was likely pushed into the sinus cavities from the nasal packings during his severe epistaxis when he was hospitalized and this can take some time for this old blood to drain out of the sinus cavities.  -Not aware of any issues with his embolization procedure.  Reviewed the IR note which described a successful embolization of the internal maxillary arteries on January 19  -Discussed the importance of ointment and saline application at least twice a day to keep his nose moist  -Otherwise no abnormalities on exam other than some crusting near the inferior turbinates possibly healing mucosa from the nasal packing trauma but no evidence of active bleeding or blood clots in the nasal cavities today    The patient indicates understanding of these issues and agrees to the plan.      Son Trinidad MD  3/5/2025  1:13 PM

## 2025-03-26 ENCOUNTER — OFFICE VISIT (OUTPATIENT)
Facility: CLINIC | Age: 85
End: 2025-03-26
Payer: MEDICARE

## 2025-03-26 VITALS
WEIGHT: 137 LBS | OXYGEN SATURATION: 99 % | TEMPERATURE: 98 F | DIASTOLIC BLOOD PRESSURE: 60 MMHG | SYSTOLIC BLOOD PRESSURE: 130 MMHG | HEART RATE: 60 BPM | BODY MASS INDEX: 22.02 KG/M2 | HEIGHT: 66 IN

## 2025-03-26 DIAGNOSIS — R73.03 PREDIABETES: ICD-10-CM

## 2025-03-26 DIAGNOSIS — N18.9 CHRONIC KIDNEY DISEASE, UNSPECIFIED CKD STAGE: ICD-10-CM

## 2025-03-26 DIAGNOSIS — R04.0 SEVERE EPISTAXIS: Primary | ICD-10-CM

## 2025-03-26 DIAGNOSIS — R00.2 PALPITATION: ICD-10-CM

## 2025-03-26 LAB
ANION GAP SERPL CALC-SCNC: 10 MMOL/L (ref 0–18)
BUN BLD-MCNC: 20 MG/DL (ref 9–23)
CALCIUM BLD-MCNC: 9.7 MG/DL (ref 8.7–10.6)
CHLORIDE SERPL-SCNC: 101 MMOL/L (ref 98–112)
CO2 SERPL-SCNC: 29 MMOL/L (ref 21–32)
CREAT BLD-MCNC: 0.98 MG/DL
EGFRCR SERPLBLD CKD-EPI 2021: 76 ML/MIN/1.73M2 (ref 60–?)
EST. AVERAGE GLUCOSE BLD GHB EST-MCNC: 117 MG/DL (ref 68–126)
FASTING STATUS PATIENT QL REPORTED: NO
GLUCOSE BLD-MCNC: 116 MG/DL (ref 70–99)
HBA1C MFR BLD: 5.7 % (ref ?–5.7)
OSMOLALITY SERPL CALC.SUM OF ELEC: 294 MOSM/KG (ref 275–295)
POTASSIUM SERPL-SCNC: 4 MMOL/L (ref 3.5–5.1)
SODIUM SERPL-SCNC: 140 MMOL/L (ref 136–145)

## 2025-03-26 PROCEDURE — 93000 ELECTROCARDIOGRAM COMPLETE: CPT | Performed by: INTERNAL MEDICINE

## 2025-03-26 PROCEDURE — 99214 OFFICE O/P EST MOD 30 MIN: CPT | Performed by: INTERNAL MEDICINE

## 2025-03-26 PROCEDURE — 80048 BASIC METABOLIC PNL TOTAL CA: CPT | Performed by: INTERNAL MEDICINE

## 2025-03-26 PROCEDURE — 83735 ASSAY OF MAGNESIUM: CPT | Performed by: INTERNAL MEDICINE

## 2025-03-26 PROCEDURE — 83036 HEMOGLOBIN GLYCOSYLATED A1C: CPT | Performed by: INTERNAL MEDICINE

## 2025-03-26 RX ORDER — TRIAMCINOLONE ACETONIDE 5 MG/G
CREAM TOPICAL
Qty: 30 G | Refills: 1 | Status: SHIPPED | OUTPATIENT
Start: 2025-03-26

## 2025-03-27 DIAGNOSIS — N18.9 CHRONIC KIDNEY DISEASE, UNSPECIFIED CKD STAGE: Primary | ICD-10-CM

## 2025-03-27 LAB — MAGNESIUM SERPL-MCNC: 2.1 MG/DL (ref 1.6–2.6)

## 2025-03-27 NOTE — PROGRESS NOTES
Cl De Leon MD  3/27/2025  8:06 AM CDT Back to Top      A1C 5.7 little worse,  little high needs stricter diabetic diet no R  Kidneys and potassium normal on BUMEX, add-on lab for Magnesium level off same blood and will call him later     Magnesium order placed

## 2025-03-28 ENCOUNTER — DOCUMENTATION ONLY (OUTPATIENT)
Facility: CLINIC | Age: 85
End: 2025-03-28

## 2025-03-28 LAB
ATRIAL RATE: 55 BPM
P AXIS: 78 DEGREES
P-R INTERVAL: 196 MS
Q-T INTERVAL: 414 MS
QRS DURATION: 102 MS
QTC CALCULATION (BEZET): 396 MS
R AXIS: 66 DEGREES
T AXIS: 63 DEGREES
VENTRICULAR RATE: 55 BPM

## 2025-03-29 NOTE — PROGRESS NOTES
Subjective:   Patient ID: Jose Angel Kincaid is a 84 year old male.fu severe epistaxis-no recurrence    In/out ERs and finally hospitalized for severe epistaxis requiring arterial embolization on 1/19. Has had ENT followups no recurrence.         History/Other:   Review of Systems   Constitutional:  Positive for fatigue.   HENT:  Negative for nosebleeds (resolved).    Eyes:         Cataracts removed both eyes   Respiratory:  Negative for shortness of breath.         Covid in 2022, hx pneumonia   Cardiovascular:  Positive for palpitations and leg swelling. Negative for chest pain.        HTN and lipids on meds- stent   Gastrointestinal:  Negative for abdominal pain, diarrhea, nausea and vomiting.        Reflux controlled on med, inguinal hernia repair, appendectomy     Endocrine: Negative.    Genitourinary:  Positive for frequency (Bumex).        Renal cysts, BPH, CKD   Musculoskeletal:  Positive for arthralgias. Negative for back pain (gone since OR 3/2023) and gait problem.        Lumbar spinal stenosis-back OR 3/2023 doing well   Skin:  Positive for rash (new last 2 weeks across abdomen).        Hx multiple skin cancers   Neurological:  Negative for weakness.   Hematological:  Bruises/bleeds easily.   Psychiatric/Behavioral: Negative.       Current Outpatient Medications   Medication Sig Dispense Refill    triamcinolone 0.5 % External Cream Apply to affected areas on the right arm twice daily. Once resolved, switch to moisturizers.  Avoid face, underarms, and groin. 30 g 1    Potassium Chloride ER 10 MEQ Oral Tab CR 1 tablet (10 mEq total) daily.      aspirin 81 MG Oral Tab EC Take one tablet (81 mg total) by mouth once daily on Monday, Wednesday, and Friday      Psyllium 58.6 % Oral Powder Take 1 tablespoon mixed in any beverage by mouth once daily      amLODIPine 5 MG Oral Tab Take 1 tablet (5 mg total) by mouth daily. 90 tablet 2    atorvastatin 40 MG Oral Tab Take 1 tablet (40 mg total) by mouth nightly. 90  tablet 2    bumetanide (BUMEX) 1 MG Oral Tab One daily am 90 tablet 3    metoprolol succinate ER 25 MG Oral Tablet 24 Hr Take 1 tablet (25 mg total) by mouth in the morning and 1 tablet (25 mg total) before bedtime. 180 tablet 3    tamsulosin 0.4 MG Oral Cap TAKE ONE CAPSULE BY MOUTH ONE TIME DAILY 90 capsule 3    Omeprazole 40 MG Oral Capsule Delayed Release Take 1 capsule (40 mg total) by mouth daily. 90 capsule 3    ergocalciferol 1.25 MG (19539 UT) Oral Cap Take 1 capsule (50,000 Units total) by mouth every 14 (fourteen) days. 6 capsule 3    Apoaequorin (PREVAGEN) 10 MG Oral Cap Take 1 tablet by mouth daily.      fluticasone propionate 50 MCG/ACT Inhalation Aerosol Powder, Breath Activated Inhale 1 puff into the lungs as needed. 1 each 3    ascorbic acid 1000 MG Oral Tab Take 0.5 tablets (500 mg total) by mouth 2 (two) times daily.      Selenium 100 MCG Oral Tab Take 1 tablet (100 mcg total) by mouth every evening. 60 tablet 0    magnesium 250 MG Oral Tab Take 1 tablet (250 mg total) by mouth every other day. Magnesium oxide 250mg every other day. 30 tablet 0    omega-3 fatty acids 1000 MG Oral Cap Take 1,000 mg by mouth daily.      Nutritional Supplements (JUICE PLUS FIBRE OR) Take 2 tablets by mouth in the morning, at noon, and at bedtime.      Zinc 50 MG Oral Tab Take 1 tablet by mouth one time.      Coenzyme Q10 (COQ10) 100 MG Oral Cap Take 1 capsule by mouth at bedtime.      Glucosamine-Chondroit-Vit C-Mn (GLUCOSAMINE CHONDR 1500 COMPLX OR) Take 1 tablet by mouth 2 (two) times daily.      Calcium Carbonate-Vitamin D (CALTRATE 600+D OR) Take 1 tablet by mouth every other day.      metFORMIN 500 MG Oral Tab Take 0.5 tablets (250 mg total) by mouth at bedtime. 45 tablet 0     Allergies:Allergies[1]    Objective:   Physical Exam  Constitutional:       General: He is not in acute distress.     Appearance: He is not ill-appearing.   HENT:      Nose:      Comments: Redness both sides septum no active  bleeding  Cardiovascular:      Rate and Rhythm: Normal rate and regular rhythm.      Heart sounds: Normal heart sounds.   Pulmonary:      Effort: No respiratory distress.      Breath sounds: Normal breath sounds.   Abdominal:      Tenderness: There is no abdominal tenderness.   Musculoskeletal:         General: Tenderness (lumbar incision) present.      Right lower leg: Edema present.      Left lower leg: Edema present.   Skin:     Comments: Multiple surgical scars   Neurological:      Mental Status: He is alert. Mental status is at baseline.      Motor: No weakness.      Gait: Gait normal.   Psychiatric:         Mood and Affect: Mood normal.         Thought Content: Thought content normal.       Active Problems:  Patient Active Problem List   Diagnosis    Polycystic kidney    Skin cancer    CAD (coronary artery disease)    History of migraine    Osteopenia    Carotid artery plaque    Dyslipidemia    Benign prostatic hyperplasia    Elevated lipoprotein(a)    Arteriosclerosis of thoracic aorta    Benign essential HTN    Hiatal hernia with GERD    COPD (chronic obstructive pulmonary disease) (HCC)    Lumbar radiculopathy    Elevated C-reactive protein (CRP)    Bilateral cataracts    Lumbar spondylosis    Prediabetes    Mitral regurgitation    Epistaxis    Syncope and collapse       Past Medical History:  Past Medical History:    Arteriosclerosis of thoracic aorta    CT incidental December 2019    Benign non-nodular prostatic hyperplasia with lower urinary tract symptoms    By imaging and modest symptoms     Bilateral cataracts    Formatting of this note might be different from the original.  Overview:   MODEST   -2014/2015    Bilateral plantar fasciitis    BPH (benign prostatic hypertrophy)    CAD (coronary artery disease)    LAD   STENTED   JONAS  -  9/2013 DR ZABALA  -  45%  LEFT   MAIN ALSO   SEEN   LV  45%      Carotid artery plaque     15-49%    9/2019     30% bilateral December 2017    Cataract    COPD (chronic  obstructive pulmonary disease) (HCC)    COVID-19    3 days of cough, runny nose, low saturations needing oxygen.    Elevated C-reactive protein (CRP)    Formatting of this note might be different from the original.  Overview:   September 2016 will repeat occurred during infection viral    Elevated lipoprotein(a)    Essential hypertension    Herpes    Hiatal hernia with GERD    History of migraine    EXERCISE X 4  SINCE 2012     Hyperlipidemia    Inguinal hernia    Lumbar radiculopathy    Surgery 3/2023    Mass of upper lobe of left lung    resolved    Mitral regurgitation    mild-last echo 1/2025    OA (osteoarthritis)    hips and hands    Osteopenia    T     -2.1    2014     Pneumonia due to organism    Polycystic renal disease    Prediabetes    Skin cancer    New  Episode  Left  Cheek  -   9/2018  -    SQUAMOUS   NOSE.   F/U DR DEE  2X  YEARLY  -   WITH  RECENT   AK'S    Squamous cell carcinoma    nose x 3 and hand    Vitamin D deficiency       Past Surgical History:  Past Surgical History:   Procedure Laterality Date    Angioplasty (coronary)  09/2013 9/13 cath 40% ostial LM, 80-90% mid to distal LAD (2.5 x 26mm Resolute Integrity) EF 45%    Appendectomy  1954    Cataract      Colonoscopy  01/2004    Colonoscopy N/A 10/31/2014    Procedure: COLONOSCOPY;  Surgeon: Ken Vinson MD;  Location:  ENDOSCOPY    Hernia surgery  1997    right    Injection, anesthetic/steroid, paravertebral facet joint/nerve; lumbar/sacral, single level  04/06/2022    Parminder    Ir angiogram cerebral carotid bilateral  1/19/2025    Ir aspiration/injection  04/28/2022    Parminder    Ir head and neck embolization  1/19/2025    Lumbar spine surgery  03/08/2023        Skin surgery  01/08/2020    MOHS SCCIS Right mid posterior scalp    Skin surgery  02/11/2020    MOHS, BCC, right preauricular - Dr. ABA Harris       Family History:  Family History   Problem Relation Age of Onset    Pacemaker Mother     Hypertension Mother        Social  History:  Social History     Socioeconomic History    Marital status: Single     Spouse name: Not on file    Number of children: 0    Years of education: 16 college    Highest education level: Not on file   Occupational History    Occupation: retired   Tobacco Use    Smoking status: Never    Smokeless tobacco: Never   Vaping Use    Vaping status: Never Used   Substance and Sexual Activity    Alcohol use: Yes     Comment: rare beer/wine    Drug use: No    Sexual activity: Never   Other Topics Concern     Service Not Asked    Blood Transfusions Not Asked    Caffeine Concern Yes     Comment: some    Occupational Exposure Not Asked    Hobby Hazards Not Asked    Sleep Concern Not Asked    Stress Concern No    Weight Concern No    Special Diet No    Back Care Not Asked    Exercise Yes     Comment: vigorous    Bike Helmet Not Asked    Seat Belt Yes    Self-Exams Not Asked   Social History Narrative    Single    No kids    Retired sales (chemical sales to steel industry)    No tobacco, drugs    1 wine 5-6x/wk     Social Drivers of Health     Food Insecurity: No Food Insecurity (1/18/2025)    Food Insecurity     Food Insecurity: Never true   Transportation Needs: No Transportation Needs (1/18/2025)    Transportation Needs     Lack of Transportation: No     Car Seat: Not on file   Stress: Not on file   Housing Stability: Low Risk  (1/18/2025)    Housing Stability     Housing Instability: No     Housing Instability Emergency: Not on file     Crib or Bassinette: Not on file       Health Maintenance:    Immunization History   Administered Date(s) Administered    Covid-19 Vaccine Pfizer 30 mcg/0.3 ml 02/02/2021, 02/23/2021, 09/24/2021, 05/13/2022    Covid-19 Vaccine Pfizer Bivalent 30mcg/0.3mL 09/14/2022    Covid-19 Vaccine Pfizer Juan-Sucrose 30 mcg/0.3 ml 05/13/2022    DTAP 09/14/2014    FLU VAC High Dose 65 YRS & Older PRSV Free (40564) 10/08/2014, 10/13/2015, 09/23/2019, 09/10/2020, 10/23/2021, 10/10/2022, 09/12/2023     FLUAD High Dose 65 yr and older (95422) 10/09/2017, 09/24/2018    FLUZONE 6 months and older PFS 0.5 ml (26361) 09/10/2020    Fluzone Vaccine Medicare () 10/08/2014, 10/13/2015, 10/24/2016, 09/23/2019    High Dose Fluzone Influenza Vaccine, 65yr+ PF 0.5mL (65050) 10/24/2016, 10/17/2024    Influenza 09/14/2009, 10/18/2012, 10/11/2013    Pfizer Covid-19 Vaccine 30mcg/0.3ml 12yrs+ 10/12/2023, 09/26/2024    Pneumococcal (Prevnar 13) 09/14/2015    Pneumococcal Conjugate PCV20 02/26/2025    Pneumovax 23 09/08/2005    RSV, recombinant, RSVpreF, adjuvanted (Arexvy) 11/29/2023    TDAP 09/14/2014, 06/05/2019, 07/15/2024    Tetanus 09/15/2006    Zoster Vaccine Live (Zostavax) 01/01/2008    Zoster Vaccine Recombinant Adjuvanted (Shingrix) 10/17/2019, 02/22/2020       Labs and Tests:  Patient Active Problem List   Component Date Value Ref Range Status    HGB 02/26/2025 12.4 (L)  13.0 - 17.5 g/dL Final    Glucose 02/26/2025 108 (H)  70 - 99 mg/dL Final    Sodium 02/26/2025 138  136 - 145 mmol/L Final    Potassium 02/26/2025 3.6  3.5 - 5.1 mmol/L Final    Chloride 02/26/2025 102  98 - 112 mmol/L Final    CO2 02/26/2025 32.0  21.0 - 32.0 mmol/L Final    Anion Gap 02/26/2025 4  0 - 18 mmol/L Final    BUN 02/26/2025 21  9 - 23 mg/dL Final    Creatinine 02/26/2025 0.97  0.70 - 1.30 mg/dL Final    Calcium, Total 02/26/2025 9.4  8.7 - 10.6 mg/dL Final    Calculated Osmolality 02/26/2025 290  275 - 295 mOsm/kg Final    eGFR-Cr 02/26/2025 77  >=60 mL/min/1.73m2 Final    Patient Fasting for BMP? 02/26/2025 No   Final    Magnesium 02/26/2025 2.1  1.6 - 2.6 mg/dL Final       Vitals:  Vitals:    03/26/25 1520   BP: 130/60   BP Location: Left arm   Patient Position: Sitting   Cuff Size: adult   Pulse: 60   Temp: 98.1 °F (36.7 °C)   SpO2: 99%   Weight: 137 lb (62.1 kg)   Height: 5' 6\" (1.676 m)     Body mass index is 22.11 kg/m².    EKG ok  Echo 1/2025  Nuclear 10/2024      Assessment & Plan:   1. Severe epistaxis    2. Palpitation     3. Chronic kidney disease, unspecified CKD stage    4. Prediabetes      Has ENT followups  but epistaxis seems resolved  Labs drawn for Pot, Mag, CKD-ok  A1C 5.7 borderline, avoids sugar, would hold on rx metformin  Can fu cardio  RTC 3mos        Orders Placed This Encounter   Procedures    Basic Metabolic Panel (8) [E]    Hemoglobin A1C       Meds This Visit:  Requested Prescriptions     Signed Prescriptions Disp Refills    triamcinolone 0.5 % External Cream 30 g 1     Sig: Apply to affected areas on the right arm twice daily. Once resolved, switch to moisturizers.  Avoid face, underarms, and groin.       Imaging & Referrals:  ELECTROCARDIOGRAM, COMPLETE         [1]   Allergies  Allergen Reactions    Codeine OTHER (SEE COMMENTS)     Throat  tightness    Hctz [Hydrochlorothiazide] UNKNOWN    Nsaids OTHER (SEE COMMENTS)     GI    Zofran [Ondansetron] OTHER (SEE COMMENTS)     Constipation     Pepcid [Famotidine] OTHER (SEE COMMENTS)     neck and shoulder pains

## 2025-04-21 ENCOUNTER — PATIENT MESSAGE (OUTPATIENT)
Facility: CLINIC | Age: 85
End: 2025-04-21

## 2025-04-21 ENCOUNTER — TELEPHONE (OUTPATIENT)
Facility: CLINIC | Age: 85
End: 2025-04-21

## 2025-04-21 NOTE — TELEPHONE ENCOUNTER
Dr De Leon/Jennifer, This contact is regarding the skin rash on my stomach area. The above named   cream has helped but not eliminated the problem. I plan to leave town at the end of this week and  will be gone for a week to ten days. I would like to have a prescription for this product phoned into  the [facility] in [place], on [facility]. I do have a skin biopsy scheduled this week.     Thanks,  Jose Angel Kincaid  07/10/1940

## 2025-05-05 ENCOUNTER — MED REC SCAN ONLY (OUTPATIENT)
Facility: CLINIC | Age: 85
End: 2025-05-05

## 2025-05-29 ENCOUNTER — DOCUMENTATION ONLY (OUTPATIENT)
Facility: CLINIC | Age: 85
End: 2025-05-29

## 2025-06-02 RX ORDER — TAMSULOSIN HYDROCHLORIDE 0.4 MG/1
0.4 CAPSULE ORAL DAILY
Qty: 90 CAPSULE | Refills: 0 | Status: SHIPPED | OUTPATIENT
Start: 2025-06-02

## 2025-06-02 RX ORDER — ERGOCALCIFEROL 1.25 MG/1
50000 CAPSULE, LIQUID FILLED ORAL
Qty: 6 CAPSULE | Refills: 0 | Status: SHIPPED | OUTPATIENT
Start: 2025-06-02

## 2025-06-02 RX ORDER — OMEPRAZOLE 40 MG/1
40 CAPSULE, DELAYED RELEASE ORAL DAILY
Qty: 90 CAPSULE | Refills: 0 | Status: SHIPPED | OUTPATIENT
Start: 2025-06-02

## 2025-06-02 NOTE — TELEPHONE ENCOUNTER
Medication:     --Ergocalciferol 1.25 mg (28846 UT), 1 cap every 2 weeks. Filled: 4/10/24 6 caps 3 refills    --Tamsulosin 0.4 mg, 1 cap daily. Filled: 4/10/24 90 caps 3 refills    -- Omeprazole 40 mg, 1 cap daily. Filled:4/10/24 90 caps 3 refills    Last office visit:  3/26/25  Due back to office:  6/26/25  Future office visit:  6/9/25  Labs   3/26/25  3:54 PM    Glucose 116 High    Sodium 140   Potassium 4.0   Chloride 101   CO2 29.0   Anion Gap 10   BUN 20   Creatinine 0.98   Calcium, Total 9.7   Calculated Osmolality 294   eGFR-Cr 76

## 2025-06-11 ENCOUNTER — OFFICE VISIT (OUTPATIENT)
Facility: CLINIC | Age: 85
End: 2025-06-11
Payer: MEDICARE

## 2025-06-11 VITALS
DIASTOLIC BLOOD PRESSURE: 60 MMHG | SYSTOLIC BLOOD PRESSURE: 132 MMHG | OXYGEN SATURATION: 98 % | WEIGHT: 134 LBS | TEMPERATURE: 98 F | HEART RATE: 64 BPM | BODY MASS INDEX: 21.53 KG/M2 | HEIGHT: 66 IN

## 2025-06-11 DIAGNOSIS — R73.03 PREDIABETES: Primary | ICD-10-CM

## 2025-06-11 DIAGNOSIS — L50.9 URTICARIA: ICD-10-CM

## 2025-06-11 LAB
GLUCOSE BLOOD: 128
HEMOGLOBIN A1C: 6.3 % (ref 4.3–5.6)
TEST STRIP LOT #: NORMAL NUMERIC

## 2025-06-11 PROCEDURE — 99214 OFFICE O/P EST MOD 30 MIN: CPT | Performed by: INTERNAL MEDICINE

## 2025-06-11 PROCEDURE — 82947 ASSAY GLUCOSE BLOOD QUANT: CPT | Performed by: INTERNAL MEDICINE

## 2025-06-11 PROCEDURE — 83036 HEMOGLOBIN GLYCOSYLATED A1C: CPT | Performed by: INTERNAL MEDICINE

## 2025-06-11 RX ORDER — LEVOCETIRIZINE DIHYDROCHLORIDE 5 MG/1
5 TABLET, FILM COATED ORAL EVERY EVENING
COMMUNITY
Start: 2025-06-11

## 2025-06-11 RX ORDER — BETAMETHASONE DIPROPIONATE 0.5 MG/G
1 CREAM TOPICAL 2 TIMES DAILY
COMMUNITY
Start: 2025-05-16

## 2025-06-12 ENCOUNTER — TELEPHONE (OUTPATIENT)
Facility: CLINIC | Age: 85
End: 2025-06-12

## 2025-06-12 NOTE — TELEPHONE ENCOUNTER
Called Patient ([phone])    Informed of provider note below and he verbalized understanding  Patient expressed concern that his A1c will worsen regardless and questioned if he should start insulin now vs later    Component      Latest Ref Rng 3/26/2025 6/11/2025   HEMOGLOBIN A1C      4.3 - 5.6 %  6.3 !    Cartridge Lot#      Numeric  [id number]    Cartridge Expiration Date      Date  2/17/27    HEMOGLOBIN A1c      <5.7 % 5.7 (H)     ESTIMATED AVERAGE GLUCOSE      68 - 126 mg/dL 117        Legend:  (H) High  ! Abnormal    Advised that his A1c, while elevated, is in prediabetic range, not yet DM  Discussed diet, states he eats a lot of chicken, fish, veggies, and fruits, but doesn't really eat carbs.  Discussed complex vs simple carbs and consuming in moderation. Patient verbalized understanding and states he will research this a little further     Xyzal was added to medication list as historical by PCP

## 2025-06-12 NOTE — TELEPHONE ENCOUNTER
Cl De Leon MD  [facility]  Ask him to add Xyzal 5mg daily OTC for hives, use steroid creme prn

## 2025-06-12 NOTE — PROGRESS NOTES
Subjective:   Patient ID: Jose Angel Kincaid is a 84 year old male.Prediabetes and urticaria    Prediabetes on 250 metformin hs. Watching sugar, lost couple pounds., want BS and A1C checked. Also has episodes of intermittent urticaria last couple weeks.        History/Other:   Review of Systems   Constitutional:  Positive for unexpected weight change (wt loss a few pounds).   HENT:  Negative for nosebleeds (resolved).    Eyes:         Cataracts removed both eyes   Respiratory:  Negative for shortness of breath.         Covid in 2022, hx pneumonia   Cardiovascular:  Negative for chest pain.        HTN and lipids on meds- stent   Gastrointestinal:  Negative for abdominal pain, diarrhea, nausea and vomiting.        Reflux controlled on med, inguinal hernia repair, appendectomy     Endocrine:        Prediabetes on metformin   Genitourinary:  Positive for frequency (Bumex).        Renal cysts, BPH, CKD   Musculoskeletal:  Positive for arthralgias. Negative for back pain (gone since OR 3/2023) and gait problem.        Lumbar spinal stenosis-back OR 3/2023 doing well   Skin:  Positive for rash (urticarial-nothing to show now).        Hx multiple skin cancers   Neurological:  Negative for weakness.   Hematological:  Bruises/bleeds easily.   Psychiatric/Behavioral: Negative.       Current Medications[1]  Allergies:Allergies[2]    Objective:   Physical Exam  Constitutional:       General: He is not in acute distress.     Appearance: He is not ill-appearing.   HENT:      Nose:      Comments: Redness both sides septum no active bleeding  Cardiovascular:      Rate and Rhythm: Normal rate and regular rhythm.      Heart sounds: Normal heart sounds.   Pulmonary:      Effort: No respiratory distress.      Breath sounds: Normal breath sounds.   Abdominal:      Tenderness: There is no abdominal tenderness.   Musculoskeletal:         General: Tenderness (lumbar incision) present.   Skin:     Findings: Rash (eczematous rash across abd,  no urticaria seen) present.      Comments: Multiple surgical scars   Neurological:      Mental Status: He is alert. Mental status is at baseline.      Motor: No weakness.      Gait: Gait normal.   Psychiatric:         Mood and Affect: Mood normal.         Thought Content: Thought content normal.       Active Problems:  Problem List[3]    Past Medical History:  Past Medical History[4]    Past Surgical History:  Past Surgical History[5]    Family History:  Family History[6]    Social History:  Social History     Socioeconomic History    Marital status: Single     Spouse name: Not on file    Number of children: 0    Years of education: 16 college    Highest education level: Not on file   Occupational History    Occupation: retired   Tobacco Use    Smoking status: Never    Smokeless tobacco: Never   Vaping Use    Vaping status: Never Used   Substance and Sexual Activity    Alcohol use: Not Currently     Comment: rare beer/wine    Drug use: No    Sexual activity: Never   Other Topics Concern     Service Not Asked    Blood Transfusions Not Asked    Caffeine Concern Yes     Comment: some    Occupational Exposure Not Asked    Hobby Hazards Not Asked    Sleep Concern Not Asked    Stress Concern No    Weight Concern No    Special Diet No    Back Care Not Asked    Exercise Yes     Comment: vigorous    Bike Helmet Not Asked    Seat Belt Yes    Self-Exams Not Asked   Social History Narrative    Single    No kids    Retired sales (chemical sales to steel industry)    No tobacco, drugs    1 wine 5-6x/wk     Social Drivers of Health     Food Insecurity: No Food Insecurity (6/11/2025)    NCSS - Food Insecurity     Worried About Running Out of Food in the Last Year: No     Ran Out of Food in the Last Year: No   Transportation Needs: No Transportation Needs (6/11/2025)    NCSS - Transportation     Lack of Transportation: No   Stress: Not on file   Housing Stability: Not At Risk (6/11/2025)    NCSS - Housing/Utilities     Has  Housing: Yes     Worried About Losing Housing: No     Unable to Get Utilities: No       Health Maintenance:    Immunization History   Administered Date(s) Administered    Covid-19 Vaccine Pfizer 30 mcg/0.3 ml 02/02/2021, 02/23/2021, 09/24/2021, 05/13/2022    Covid-19 Vaccine Pfizer Bivalent 30mcg/0.3mL 09/14/2022    Covid-19 Vaccine Pfizer Juan-Sucrose 30 mcg/0.3 ml 05/13/2022    DTAP 09/14/2014    FLU VAC High Dose 65 YRS & Older PRSV Free (69186) 10/08/2014, 10/13/2015, 09/23/2019, 09/10/2020, 10/23/2021, 10/10/2022, 09/12/2023    FLUAD High Dose 65 yr and older (29661) 10/09/2017, 09/24/2018    FLUZONE 6 months and older PFS 0.5 ml (71829) 09/10/2020    Fluzone Vaccine Medicare () 10/08/2014, 10/13/2015, 10/24/2016, 09/23/2019    High Dose Fluzone Influenza Vaccine, 65yr+ PF 0.5mL (98226) 10/24/2016, 10/17/2024    Influenza 09/14/2009, 10/18/2012, 10/11/2013    Pfizer Covid-19 Vaccine 30mcg/0.3ml 12yrs+ 10/12/2023, 09/26/2024    Pneumococcal (Prevnar 13) 09/14/2015    Pneumococcal Conjugate PCV20 02/26/2025    Pneumovax 23 09/08/2005    RSV, recombinant, RSVpreF, adjuvanted (Arexvy) 11/29/2023    TDAP 09/14/2014, 06/05/2019, 07/15/2024    Tetanus 09/15/2006    Zoster Vaccine Live (Zostavax) 01/01/2008    Zoster Vaccine Recombinant Adjuvanted (Shingrix) 10/17/2019, 02/22/2020       Labs and Tests:  Office Visit on 03/26/2025   Component Date Value Ref Range Status    Ventricular rate 03/26/2025 55  BPM Final    Atrial rate 03/26/2025 55  BPM Final    P-R Interval 03/26/2025 196  ms Final    QRS Duration 03/26/2025 102  ms Final    Q-T Interval 03/26/2025 414  ms Final    QTC Calculation (Bezet) 03/26/2025 396  ms Final    P Axis 03/26/2025 78  degrees Final    R Axis 03/26/2025 66  degrees Final    T [place] 03/26/2025 63  degrees Final    Glucose 03/26/2025 116 (H)  70 - 99 mg/dL Final    Sodium 03/26/2025 140  136 - 145 mmol/L Final    Potassium 03/26/2025 4.0  3.5 - 5.1 mmol/L Final    Chloride 03/26/2025 101   98 - 112 mmol/L Final    CO2 03/26/2025 29.0  21.0 - 32.0 mmol/L Final    Anion Gap 03/26/2025 10  0 - 18 mmol/L Final    BUN 03/26/2025 20  9 - 23 mg/dL Final    Creatinine 03/26/2025 0.98  0.70 - 1.30 mg/dL Final    Calcium, Total 03/26/2025 9.7  8.7 - 10.6 mg/dL Final    Calculated Osmolality 03/26/2025 294  275 - 295 mOsm/kg Final    eGFR-Cr 03/26/2025 76  >=60 mL/min/1.73m2 Final    Patient Fasting for BMP? 03/26/2025 No   Final    HgbA1C 03/26/2025 5.7 (H)  <5.7 % Final     Normal HbA1C:     <5.7%      Pre-Diabetic:     5.7 - 6.4%      Diabetic:         >6.4%      Diabetic Control: <7.0%        Estimated Average Glucose 03/26/2025 117  68 - 126 mg/dL Final    eAG is the estimated average glucose calculated from Hgb A1c according to the formula recommended by the American Diabetes Association. eAG levels reflect the long term average glucose and may not correlate with random or fasting glucose levels since these represent specific points in time.           Magnesium 03/26/2025 2.1  1.6 - 2.6 mg/dL Final       Vitals:  Vitals:    06/11/25 1153   BP: 132/60   BP Location: Left arm   Patient Position: Sitting   Cuff Size: adult   Pulse: 64   Temp: 98.1 °F (36.7 °C)   SpO2: 98%   Weight: 134 lb (60.8 kg)   Height: 5' 6\" (1.676 m)     Body mass index is 21.63 kg/m².    Non-fasting XR=100 ok  and A1C = 6.3 up from 5.7      Assessment & Plan:   1. Prediabetes    2. Urticaria      Unexpected worsening of A1C on Metformin 250 benjie with wt loss few pounds, one more try at 500mg then might have to consider adult-onset type 1 DM  Reports intermittent hives, no keyon suspect, possibly environmental, lot stress moving to [place] next month to live with brother and selling his home here.   Said he is leaving my practice after 7/31.   Try one more visit before he leaves  Has steroid cream for hives, add Xyzal 5mg OTC, may need Allergist or Derm referral        Orders Placed This Encounter   Procedures    POC Hemoglobin A1C     ASSAY QUANTITATIVE, GLUCOSE       Meds This Visit:  Requested Prescriptions     Signed Prescriptions Disp Refills    metFORMIN 500 MG Oral Tab 60 tablet 0     Sig: Take 1 tablet (500 mg total) by mouth at bedtime.       Imaging & Referrals:  None         [1]   Current Outpatient Medications   Medication Sig Dispense Refill    betamethasone dipropionate 0.05 % External Cream Apply 1 Application topically 2 (two) times daily.      metFORMIN 500 MG Oral Tab Take 1 tablet (500 mg total) by mouth at bedtime. 60 tablet 0    levocetirizine 5 MG Oral Tab Take 1 tablet (5 mg total) by mouth every evening.      ERGOCALCIFEROL 1.25 MG (58219 UT) Oral Cap Take 1 capsule (50,000 Units total) by mouth every 14 (fourteen) days. 6 capsule 0    TAMSULOSIN 0.4 MG Oral Cap TAKE ONE CAPSULE BY MOUTH ONE TIME DAILY 90 capsule 0    OMEPRAZOLE 40 MG Oral Capsule Delayed Release Take 1 capsule (40 mg total) by mouth daily. 90 capsule 0    triamcinolone 0.5 % External Cream Apply to affected areas on the right arm twice daily. Once resolved, switch to moisturizers.  Avoid face, underarms, and groin. 30 g 1    Potassium Chloride ER 10 MEQ Oral Tab CR 1 tablet (10 mEq total) daily.      aspirin 81 MG Oral Tab EC Take one tablet (81 mg total) by mouth once daily on Monday, Wednesday, and Friday      Psyllium 58.6 % Oral Powder Take 1 tablespoon mixed in any beverage by mouth once daily      amLODIPine 5 MG Oral Tab Take 1 tablet (5 mg total) by mouth daily. 90 tablet 2    atorvastatin 40 MG Oral Tab Take 1 tablet (40 mg total) by mouth nightly. 90 tablet 2    bumetanide (BUMEX) 1 MG Oral Tab One daily am 90 tablet 3    metoprolol succinate ER 25 MG Oral Tablet 24 Hr Take 1 tablet (25 mg total) by mouth in the morning and 1 tablet (25 mg total) before bedtime. 180 tablet 3    Apoaequorin (PREVAGEN) 10 MG Oral Cap Take 1 tablet by mouth daily.      fluticasone propionate 50 MCG/ACT Inhalation Aerosol Powder, Breath Activated Inhale 1 puff  into the lungs as needed. 1 each 3    ascorbic acid 1000 MG Oral Tab Take 0.5 tablets (500 mg total) by mouth 2 (two) times daily.      Selenium 100 MCG Oral Tab Take 1 tablet (100 mcg total) by mouth every evening. 60 tablet 0    magnesium 250 MG Oral Tab Take 1 tablet (250 mg total) by mouth every other day. Magnesium oxide 250mg every other day. 30 tablet 0    omega-3 fatty acids 1000 MG Oral Cap Take 1,000 mg by mouth daily.      Nutritional Supplements (JUICE PLUS FIBRE OR) Take 2 tablets by mouth in the morning, at noon, and at bedtime.      Zinc 50 MG Oral Tab Take 1 tablet by mouth one time.      Coenzyme Q10 (COQ10) 100 MG Oral Cap Take 1 capsule by mouth at bedtime.      Glucosamine-Chondroit-Vit C-Mn (GLUCOSAMINE CHONDR 1500 COMPLX OR) Take 1 tablet by mouth 2 (two) times daily.      Calcium Carbonate-Vitamin D (CALTRATE 600+D OR) Take 1 tablet by mouth every other day.     [2]   Allergies  Allergen Reactions    Codeine OTHER (SEE COMMENTS)     Throat  tightness    Hctz [Hydrochlorothiazide] UNKNOWN    Nsaids OTHER (SEE COMMENTS)     GI    Zofran [Ondansetron] OTHER (SEE COMMENTS)     Constipation     Pepcid [Famotidine] OTHER (SEE COMMENTS)     neck and shoulder pains   [3]   Patient Active Problem List  Diagnosis    Polycystic kidney    Skin cancer    CAD (coronary artery disease)    History of migraine    Osteopenia    Carotid artery plaque    Dyslipidemia    Benign prostatic hyperplasia    Elevated lipoprotein(a)    Arteriosclerosis of thoracic aorta    Benign essential HTN    Hiatal hernia with GERD    COPD (chronic obstructive pulmonary disease) (HCC)    Lumbar radiculopathy    Elevated C-reactive protein (CRP)    Bilateral cataracts    Lumbar spondylosis    Prediabetes    Mitral regurgitation    Epistaxis    Syncope and collapse    Yamil's disease   [4]   Past Medical History:   Arteriosclerosis of thoracic aorta    CT incidental December 2019    Benign non-nodular prostatic hyperplasia with lower  urinary tract symptoms    By imaging and modest symptoms     Bilateral cataracts    Formatting of this note might be different from the original.  Overview:   MODEST   -2014/2015    Bilateral plantar fasciitis    BPH (benign prostatic hypertrophy)    CAD (coronary artery disease)    LAD   STENTED   JONAS  -  9/2013 DR ZABALA  -  45%  LEFT   MAIN ALSO   SEEN   LV  45%      Carotid artery plaque     15-49%    9/2019     30% bilateral December 2017    Cataract    COPD (chronic obstructive pulmonary disease) (HCC)    COVID-19    3 days of cough, runny nose, low saturations needing oxygen.    Elevated C-reactive protein (CRP)    Formatting of this note might be different from the original.  Overview:   September 2016 will repeat occurred during infection viral    Elevated lipoprotein(a)    Essential hypertension    Yamil's disease    Herpes    Hiatal hernia with GERD    History of migraine    EXERCISE X 4  SINCE 2012     Hyperlipidemia    Inguinal hernia    Lumbar radiculopathy    Surgery 3/2023    Mass of upper lobe of left lung    resolved    Mitral regurgitation    mild-last echo 1/2025    OA (osteoarthritis)    hips and hands    Osteopenia    T     -2.1    2014     Pneumonia due to organism    Polycystic renal disease    Prediabetes    Skin cancer    New  Episode  Left  Cheek  -   9/2018  -    SQUAMOUS   NOSE.   F/U DR DEE  2X  YEARLY  -   WITH  RECENT   AK'S    Squamous cell carcinoma    nose x 3 and hand    Vitamin D deficiency   [5]   Past Surgical History:  Procedure Laterality Date    Angioplasty (coronary)  09/2013 9/13 cath 40% ostial LM, 80-90% mid to distal LAD (2.5 x 26mm Resolute Integrity) EF 45%    Appendectomy  1954    Cataract      Colonoscopy  01/2004    Colonoscopy N/A 10/31/2014    Procedure: COLONOSCOPY;  Surgeon: Ken Vinson MD;  Location:  ENDOSCOPY    Hernia surgery  1997    right    Injection, anesthetic/steroid, paravertebral facet joint/nerve; lumbar/sacral, single level   04/06/2022    Parminder    Ir angiogram cerebral carotid bilateral  1/19/2025    Ir aspiration/injection  04/28/2022    Parminder    Ir head and neck embolization  1/19/2025    Lumbar spine surgery  03/08/2023        Skin surgery  01/08/2020    MOHS SCCIS Right mid posterior scalp    Skin surgery  02/11/2020    MOHS, BCC, right preauricular - Dr. ABA Harris   [6]   Family History  Problem Relation Age of Onset    Pacemaker Mother     Hypertension Mother

## 2025-07-07 ENCOUNTER — LAB ENCOUNTER (OUTPATIENT)
Dept: LAB | Age: 85
End: 2025-07-07
Attending: INTERNAL MEDICINE
Payer: MEDICARE

## 2025-07-07 ENCOUNTER — OFFICE VISIT (OUTPATIENT)
Facility: CLINIC | Age: 85
End: 2025-07-07
Payer: MEDICARE

## 2025-07-07 VITALS
SYSTOLIC BLOOD PRESSURE: 120 MMHG | DIASTOLIC BLOOD PRESSURE: 60 MMHG | WEIGHT: 135 LBS | HEART RATE: 63 BPM | OXYGEN SATURATION: 98 % | HEIGHT: 66 IN | BODY MASS INDEX: 21.69 KG/M2 | TEMPERATURE: 98 F

## 2025-07-07 DIAGNOSIS — E87.6 HYPOKALEMIA: ICD-10-CM

## 2025-07-07 DIAGNOSIS — R53.83 FATIGUE, UNSPECIFIED TYPE: ICD-10-CM

## 2025-07-07 DIAGNOSIS — E78.5 DYSLIPIDEMIA: ICD-10-CM

## 2025-07-07 DIAGNOSIS — R79.0 LOW MAGNESIUM LEVEL: ICD-10-CM

## 2025-07-07 DIAGNOSIS — R73.03 PREDIABETES: ICD-10-CM

## 2025-07-07 DIAGNOSIS — R73.03 PREDIABETES: Primary | ICD-10-CM

## 2025-07-07 LAB
ALBUMIN SERPL-MCNC: 4.6 G/DL (ref 3.2–4.8)
ALBUMIN/GLOB SERPL: 1.8 {RATIO} (ref 1–2)
ALP LIVER SERPL-CCNC: 79 U/L (ref 45–117)
ALT SERPL-CCNC: 47 U/L (ref 10–49)
ANION GAP SERPL CALC-SCNC: 7 MMOL/L (ref 0–18)
AST SERPL-CCNC: 57 U/L (ref ?–34)
BASOPHILS # BLD AUTO: 0.05 X10(3) UL (ref 0–0.2)
BASOPHILS NFR BLD AUTO: 0.9 %
BILIRUB SERPL-MCNC: 0.8 MG/DL (ref 0.2–1.1)
BUN BLD-MCNC: 21 MG/DL (ref 9–23)
CALCIUM BLD-MCNC: 9.7 MG/DL (ref 8.7–10.6)
CHLORIDE SERPL-SCNC: 105 MMOL/L (ref 98–112)
CHOLEST SERPL-MCNC: 131 MG/DL (ref ?–200)
CO2 SERPL-SCNC: 30 MMOL/L (ref 21–32)
CREAT BLD-MCNC: 0.93 MG/DL (ref 0.7–1.3)
EGFRCR SERPLBLD CKD-EPI 2021: 81 ML/MIN/1.73M2 (ref 60–?)
EOSINOPHIL # BLD AUTO: 0.16 X10(3) UL (ref 0–0.7)
EOSINOPHIL NFR BLD AUTO: 3 %
ERYTHROCYTE [DISTWIDTH] IN BLOOD BY AUTOMATED COUNT: 14.1 %
EST. AVERAGE GLUCOSE BLD GHB EST-MCNC: 126 MG/DL (ref 68–126)
FASTING PATIENT LIPID ANSWER: YES
FASTING STATUS PATIENT QL REPORTED: YES
GLOBULIN PLAS-MCNC: 2.6 G/DL (ref 2–3.5)
GLUCOSE BLD-MCNC: 97 MG/DL (ref 70–99)
HBA1C MFR BLD: 6 % (ref ?–5.7)
HCT VFR BLD AUTO: 41.3 % (ref 39–53)
HDLC SERPL-MCNC: 68 MG/DL (ref 40–59)
HGB BLD-MCNC: 14 G/DL (ref 13–17.5)
IMM GRANULOCYTES # BLD AUTO: 0.02 X10(3) UL (ref 0–1)
IMM GRANULOCYTES NFR BLD: 0.4 %
LDLC SERPL CALC-MCNC: 54 MG/DL (ref ?–100)
LYMPHOCYTES # BLD AUTO: 1.53 X10(3) UL (ref 1–4)
LYMPHOCYTES NFR BLD AUTO: 28.4 %
MAGNESIUM SERPL-MCNC: 2.2 MG/DL (ref 1.6–2.6)
MCH RBC QN AUTO: 32.1 PG (ref 26–34)
MCHC RBC AUTO-ENTMCNC: 33.9 G/DL (ref 31–37)
MCV RBC AUTO: 94.7 FL (ref 80–100)
MONOCYTES # BLD AUTO: 0.6 X10(3) UL (ref 0.1–1)
MONOCYTES NFR BLD AUTO: 11.2 %
NEUTROPHILS # BLD AUTO: 3.02 X10 (3) UL (ref 1.5–7.7)
NEUTROPHILS # BLD AUTO: 3.02 X10(3) UL (ref 1.5–7.7)
NEUTROPHILS NFR BLD AUTO: 56.1 %
NONHDLC SERPL-MCNC: 63 MG/DL (ref ?–130)
OSMOLALITY SERPL CALC.SUM OF ELEC: 297 MOSM/KG (ref 275–295)
PLATELET # BLD AUTO: 191 10(3)UL (ref 150–450)
POTASSIUM SERPL-SCNC: 3.8 MMOL/L (ref 3.5–5.1)
PROT SERPL-MCNC: 7.2 G/DL (ref 5.7–8.2)
RBC # BLD AUTO: 4.36 X10(6)UL (ref 3.8–5.8)
SODIUM SERPL-SCNC: 142 MMOL/L (ref 136–145)
TRIGL SERPL-MCNC: 35 MG/DL (ref 30–149)
VLDLC SERPL CALC-MCNC: 5 MG/DL (ref 0–30)
WBC # BLD AUTO: 5.4 X10(3) UL (ref 4–11)

## 2025-07-07 PROCEDURE — 85025 COMPLETE CBC W/AUTO DIFF WBC: CPT

## 2025-07-07 PROCEDURE — 83036 HEMOGLOBIN GLYCOSYLATED A1C: CPT

## 2025-07-07 PROCEDURE — 80053 COMPREHEN METABOLIC PANEL: CPT

## 2025-07-07 PROCEDURE — 80061 LIPID PANEL: CPT

## 2025-07-07 PROCEDURE — 83735 ASSAY OF MAGNESIUM: CPT

## 2025-07-07 PROCEDURE — 99213 OFFICE O/P EST LOW 20 MIN: CPT | Performed by: INTERNAL MEDICINE

## 2025-07-07 PROCEDURE — 36415 COLL VENOUS BLD VENIPUNCTURE: CPT

## 2025-07-07 RX ORDER — BETAMETHASONE VALERATE 1.2 MG/G
1 CREAM TOPICAL 2 TIMES DAILY
Qty: 45 G | Refills: 1 | Status: SHIPPED | OUTPATIENT
Start: 2025-07-07

## 2025-07-07 NOTE — PROGRESS NOTES
30 min med review and reconciliation-moving to [place] this week. Increase Betamethasone cream 0.05 to 0.10% cream for eczema type rash on trunk. Labs drawn for Glucose (metformin was raised 250 to 500, lipids, Potassium, Magnesium   Vitals:    07/07/25 0830   BP: 120/60   Pulse: 63   Temp: 98 °F (36.7 °C)

## 2025-07-15 ENCOUNTER — TELEPHONE (OUTPATIENT)
Facility: CLINIC | Age: 85
End: 2025-07-15

## 2025-07-27 ENCOUNTER — PATIENT MESSAGE (OUTPATIENT)
Facility: CLINIC | Age: 85
End: 2025-07-27

## 2025-07-28 NOTE — TELEPHONE ENCOUNTER
----- Message from Mariela Nguyễn sent at 7/11/2019  2:11 PM CDT -----  Contact:  Marcos Antony [facility]  Please call about medical release for patient to have extractions done. Ph [phone]( April)   Please see MCM from patient.   Rx pended as requested.         LOV:   6/11/2025 Dr Moises HENNESSY 4 weeks    Last refill:          Medication Quantity Refills Start End   metFORMIN 500 MG Oral Tab 60 tablet 0 6/11/2025 --   Sig:   Take 1 tablet (500 mg total) by mouth at bedtime.

## 2025-08-26 ENCOUNTER — VIRTUAL PHONE E/M (OUTPATIENT)
Facility: CLINIC | Age: 85
End: 2025-08-26

## 2025-08-26 DIAGNOSIS — R63.4 WEIGHT LOSS: Primary | ICD-10-CM

## (undated) DIAGNOSIS — G89.29 CHRONIC LOW BACK PAIN, UNSPECIFIED BACK PAIN LATERALITY, UNSPECIFIED WHETHER SCIATICA PRESENT: Primary | ICD-10-CM

## (undated) DIAGNOSIS — M54.50 CHRONIC LOW BACK PAIN, UNSPECIFIED BACK PAIN LATERALITY, UNSPECIFIED WHETHER SCIATICA PRESENT: Primary | ICD-10-CM

## (undated) DIAGNOSIS — M54.40 CHRONIC LOW BACK PAIN WITH SCIATICA, SCIATICA LATERALITY UNSPECIFIED, UNSPECIFIED BACK PAIN LATERALITY: Primary | ICD-10-CM

## (undated) DIAGNOSIS — M54.16 LUMBAR RADICULOPATHY: Primary | ICD-10-CM

## (undated) DIAGNOSIS — G89.29 CHRONIC LOW BACK PAIN WITH SCIATICA, SCIATICA LATERALITY UNSPECIFIED, UNSPECIFIED BACK PAIN LATERALITY: Primary | ICD-10-CM

## (undated) DEVICE — DERMA+FLEX TISSUE ADHESIVE

## (undated) DEVICE — REMOVER DURAPREP 3M

## (undated) DEVICE — 3M™ TEGADERM™ TRANSPARENT FILM DRESSING, 1626W, 4 IN X 4-3/4 IN (10 CM X 12 CM), 50 EACH/CARTON, 4 CARTON/CASE: Brand: 3M™ TEGADERM™

## (undated) DEVICE — BNDG ADH W1INXL3IN NAT FAB N

## (undated) DEVICE — Device: Brand: INTELLICART™

## (undated) DEVICE — 3.0MM PRECISION ROUND

## (undated) DEVICE — GLOVE SURG SENSICARE SZ 7-1/2

## (undated) DEVICE — GLOVE SURG SENSICARE SZ 7

## (undated) DEVICE — UNDYED BRAIDED (POLYGLACTIN 910), SYNTHETIC ABSORBABLE SUTURE: Brand: COATED VICRYL

## (undated) DEVICE — REMOVER PREP SOLUTION 4OZ

## (undated) DEVICE — FLOSEAL WITH RECOTHROM - 5ML: Brand: FLOSEAL HEMOSTATIC MATRIX

## (undated) DEVICE — 450 ML BOTTLE OF 0.05% CHLORHEXIDINE GLUCONATE IN 99.95% STERILE WATER FOR IRRIGATION, USP AND APPLICATOR.: Brand: IRRISEPT ANTIMICROBIAL WOUND LAVAGE

## (undated) DEVICE — STERILE POLYISOPRENE POWDER-FREE SURGICAL GLOVES: Brand: PROTEXIS

## (undated) DEVICE — SUT VICRYL 1 OS-6 J535H

## (undated) DEVICE — BANDAID COVERLET 1X3

## (undated) DEVICE — SOL NACL IRRIG 0.9% 1000ML BTL

## (undated) DEVICE — PAIN TRAY: Brand: MEDLINE INDUSTRIES, INC.

## (undated) DEVICE — NEEDLE SPINAL 22X3-1/2 BLK

## (undated) DEVICE — Device

## (undated) DEVICE — 5.0MM PRECISION ROUND

## (undated) DEVICE — WRAP COOLING BACK W/NO PILLOW

## (undated) DEVICE — SKIN MARKER DUAL TIP WITH RULER CAP AND LABELS: Brand: DEVON

## (undated) DEVICE — PEN SKIN MARKING REG TIP VIOLT

## (undated) DEVICE — SUT VICRYL 2-0 FSL J589H

## (undated) DEVICE — LAMINECTOMY CDS: Brand: MEDLINE INDUSTRIES, INC.

## (undated) DEVICE — KIT DRN 1/8IN PVC 3 SPRG EVAC

## (undated) DEVICE — LIGHT HANDLE

## (undated) DEVICE — 3M(TM) TEGADERM(TM) TRANSPARENT FILM DRESSING FRAME STYLE 1628: Brand: 3M™ TEGADERM™

## (undated) DEVICE — SLEEVE KENDALL SCD EXPRESS MED

## (undated) NOTE — LETTER
12/14/21        [id number] [id number]      Dear Deja Fish,    [facility] records indicate that you have outstanding lab work and or testing that was ordered for you and has not yet been completed:  Orders Placed This Encounter      XR

## (undated) NOTE — LETTER
[address]  [address]    Consent for Anesthesia    I, Jose Angel Kincaid agree to be cared for by a physician anesthesiologist alone and/or with a nurse anesthetist, who is specially trained to monitor me and give me medicine to put me to sleep or keep me comfortable during my procedure    I understand that my anesthesiologist and/or anesthetist is not an employee or agent of [facility] or [employer]. He or she works for [employer].    As the patient asking for anesthesia services, I agree to:  Allow the anesthesiologist (anesthesia doctor) to give me medicine and do additional procedures as necessary. Some examples are: Starting or using an “IV” to give me medicine, fluids or blood during my procedure, and having a breathing tube placed to help me breathe when I’m asleep (intubation). In the event that my heart stops working properly, I understand that my anesthesiologist will make every effort to sustain my life, unless otherwise directed by [facility] Do Not Resuscitate documents.  Tell my anesthesia doctor before my procedure:   If I am pregnant.   The last time that I ate or drank.  iii. All of the medicines I take (including prescriptions, herbal supplements, and pills I can buy without a prescription (including street drugs/illegal medications). Failure to inform my anesthesiologist about these medicines may increase my risk of anesthetic complications.  Iv.If I am allergic to anything or have had a reaction to anesthesia before.  I understand how the anesthesia medicine will help me (benefits).  I understand that with any type of anesthesia medicine there are risks:  The most common risks are: nausea, vomiting, sore throat, muscle soreness, damage to my eyes, mouth, or teeth (from breathing tube placement).  Rare risks include: remembering what happened during my procedure, allergic reactions to medications, injury to my airway,  heart, lungs, vision, nerves, or muscles and in extremely rare instances death.  My doctor has explained to me other choices available to me for my care (alternatives).  Pregnant Patients (“epidural”):  I understand that the risks of having an epidural (medicine given into my back to help control pain during labor), include itching, low blood pressure, difficulty urinating, headache or slowing of the baby’s heart. Very rare risks include infection, bleeding, seizure, irregular heart rhythms and nerve injury.  Regional Anesthesia (“spinal”, “epidural”, & “nerve blocks”):  I understand that rare but potential complications include headache, bleeding, infection, seizure, irregular heart rhythms, and nerve injury.    _____________________________________________________________________________  Patient (or Representative) Signature/Relationship to Patient  Date   Time    _____________________________________________________________________________   Name (if used)    Language/Organization   Time    _____________________________________________________________________________  Nurse Anesthetist Signature     Date   Time    ______________________________________________________________________________  Anesthesiologist Signature     Date   Time  I have discussed the procedure and information above with the patient (or patient’s representative) and answered their questions. The patient or their representative has agreed to have anesthesia services.    _____________________________________________________________________________  Witness       Date   Time  I have verified that the signature is that of the patient or patient’s representative, and that it was signed before the procedure    Patient Name: Jose Angel Kincaid     : 7/10/1940                 Printed: 2025 at 1:25 PM    Medical Record #: [id number]                                            Page 1 of 1

## (undated) NOTE — LETTER
OUTSIDE TESTING RESULT REQUEST     IMPORTANT: FOR YOUR IMMEDIATE ATTENTION  Please FAX all test results listed below to: [phone]     Testing already done on or about: 2023    * * * * If testing is NOT complete, arrange with patient A.S.A.P. * * * *      Patient Name: Bing Sheikh  Surgery Date: 3/8/2023  CSN: [id number]  Medical Record: [id number]   : 7/10/1940 - A: 80 y      Sex: male  Surgeon(s):  Noemy Perez MD  Procedure: LUMBAR 3-LUMBAR 4, LUMBAR 4-LUMBAR 5, LUMBAR 5-SACRAL 1 DECOMPRESSION FUSION WITHOUT HARDWARE  Anesthesia Type: General     Surgeon: Noemy Perez MD     The following Testing and Time Line are REQUIRED PER ANESTHESIA     EKG READ AND SIGNED WITHIN   90 days  CBC [with Differential & Platelets] within  90 days  CMP (requires 4 hour fast) within  90 days  PT/INR within  30 days  PTT within  30 days  UA with Reflex to Culture within  30 days  MSSA/MRSA Nasal screening within 30 days      Thank You,   Sent by: AVTAR

## (undated) NOTE — LETTER
[address]  [address]  Consent for Procedure/Sedation  Date: 01/19/2025         Time: 1325    I hereby authorize Dr. Davis, my physician and his/her assistants (if applicable), which may include medical students, residents, and/or fellows, to perform the following surgical operation/ procedure and administer such anesthesia as may be determined necessary by my physician: Cerebral angiogram with possible bilateral epistaxis embolization on Jose Angel Kincaid  2.   I recognize that during the surgical operation/procedure, unforeseen conditions may necessitate additional or different procedures than those listed above.  I, therefore, further authorize and request that the above-named surgeon, assistants, or designees perform such procedures as are, in their judgment, necessary and desirable.    3.   My surgeon/physician has discussed prior to my surgery the potential benefits, risks and side effects of this procedure; the likelihood of achieving goals; and potential problems that might occur during recuperation.  They also discussed reasonable alternatives to the procedure, including risks, benefits, and side effects related to the alternatives and risks related to not receiving this procedure.  I have had all my questions answered and I acknowledge that no guarantee has been made as to the result that may be obtained.    4.   Should the need arise during my operation/procedure, which includes change of level of care prior to discharge, I also consent to the administration of blood and/or blood products.  Further, I understand that despite careful testing and screening of blood or blood products by collecting agencies, I may still be subject to ill effects as a result of receiving a blood transfusion and/or blood products.  The following are some, but not all, of the potential risks that can occur: fever and allergic reactions, hemolytic reactions, transmission of diseases such  as Hepatitis, AIDS and Cytomegalovirus (CMV) and fluid overload.  In the event that I wish to have an autologous transfusion of my own blood, or a directed donor transfusion, I will discuss this with my physician.   Check only if Refusing Blood or Blood Products  I understand refusal of blood or blood products as deemed necessary by my physician may have serious consequences to my condition to include possible death. I hereby assume responsibility for my refusal and release the hospital, its personnel, and my physicians from any responsibility for the consequences of my refusal.         o  Refuse         5.   I authorize the use of any specimen, organs, tissues, body parts or foreign objects that may be removed from my body during the operation/procedure for diagnosis, research or teaching purposes and their subsequent disposal by hospital authorities.  I also authorize the release of specimen test results and/or written reports to my treating physician on the hospital medical staff or other referring or consulting physicians involved in my care, at the discretion of the Pathologist or my treating physician.    6.   I consent to the photographing or videotaping of the operations or procedures to be performed, including appropriate portions of my body for medical, scientific, or educational purposes, provided my identity is not revealed by the pictures or by descriptive texts accompanying them.  If the procedure has been photographed/videotaped, the surgeon will obtain the original picture, image, videotape or CD.  The hospital will not be responsible for storage, release or maintenance of the picture, image, tape or CD.    7.   I consent to the presence of a  or observers in the operating room as deemed necessary by my physician or their designees.    8.   I recognize that in the event my procedure results in extended X-Ray/fluoroscopy time, I may develop a skin reaction.    9. If I have a Do Not  Attempt Resuscitation (DNAR) order in place, that status will be suspended while in the operating room, procedural suite, and during the recovery period unless otherwise explicitly stated by me (or a person authorized to consent on my behalf). The surgeon or my attending physician will determine when the applicable recovery period ends for purposes of reinstating the DNAR order.  10. Patients having a sterilization procedure: I understand that if the procedure is successful the results will be permanent and it will therefore be impossible for me to inseminate, conceive, or bear children.  I also understand that the procedure is intended to result in sterility, although the result has not been guaranteed.   11. I acknowledge that my physician has explained sedation/analgesia administration to me including the risk and benefits I consent to the administration of sedation/analgesia as may be necessary or desirable in the judgment of my physician.    I CERTIFY THAT I HAVE READ AND FULLY UNDERSTAND THE ABOVE CONSENT TO OPERATION and/or OTHER PROCEDURE.        ____________________________________       _________________________________      ______________________________  Signature of Patient         Signature of Responsible Person        Printed Name of Responsible Person        ____________________________________      _________________________________      ______________________________       Signature of Witness          Relationship to Patient                       Date                                       Time  Patient Name: Jose Angel Kincaid  : 7/10/1940    Reviewed: 2024   Printed: 2025  Medical Record #: [id number] Page 1 of 1